# Patient Record
Sex: MALE | Race: WHITE | NOT HISPANIC OR LATINO | Employment: OTHER | URBAN - METROPOLITAN AREA
[De-identification: names, ages, dates, MRNs, and addresses within clinical notes are randomized per-mention and may not be internally consistent; named-entity substitution may affect disease eponyms.]

---

## 2017-04-18 ENCOUNTER — HOSPITAL ENCOUNTER (OUTPATIENT)
Dept: FAMILY MEDICINE CLINIC | Facility: CLINIC | Age: 57
Setting detail: SPECIMEN
Discharge: HOME | End: 2017-04-18
Attending: FAMILY MEDICINE | Admitting: FAMILY MEDICINE

## 2017-04-18 LAB
ANION GAP SERPL CALC-SCNC: 12.1 MMOL/L (ref 10–20)
BUN SERPL-MCNC: 15 MG/DL (ref 8–20)
BUN/CREAT SERPL: 18.8 (ref 6.2–20.3)
CALCIUM SERPL-MCNC: 9.5 MG/DL (ref 8.9–10.3)
CHLORIDE SERPL-SCNC: 111 MMOL/L (ref 101–111)
CHOLEST SERPL-MCNC: 136 MG/DL
CHOLEST/HDLC SERPL: 4.4 {RATIO}
CONV CO2: 23 MMOL/L (ref 22–32)
CONV LDL CHOLESTEROL DIRECT: 84 MG/DL (ref 0–100)
CREAT UR-MCNC: 0.8 MG/DL (ref 0.7–1.2)
GLUCOSE SERPL-MCNC: 154 MG/DL (ref 65–99)
HDLC SERPL-MCNC: 31 MG/DL
LDLC/HDLC SERPL: 2.7 {RATIO}
LIPID INTERPRETATION: ABNORMAL
POTASSIUM SERPL-SCNC: 4.1 MMOL/L (ref 3.6–5.1)
SODIUM SERPL-SCNC: 142 MMOL/L (ref 136–144)
TRIGL SERPL-MCNC: 170 MG/DL
TSH SERPL-ACNC: 5.61 UIU/ML (ref 0.34–5.6)
VLDLC SERPL CALC-MCNC: 21.2 MG/DL

## 2017-12-23 ENCOUNTER — HOSPITAL ENCOUNTER (INPATIENT)
Facility: HOSPITAL | Age: 57
LOS: 19 days | Discharge: NURSING, REHAB OR LONG-TERM CARE | End: 2018-01-11
Attending: INTERNAL MEDICINE | Admitting: INTERNAL MEDICINE

## 2017-12-23 ENCOUNTER — APPOINTMENT (OUTPATIENT)
Dept: CARDIOLOGY | Facility: HOSPITAL | Age: 57
End: 2017-12-23
Attending: INTERNAL MEDICINE

## 2017-12-23 DIAGNOSIS — R93.7 ABNORMAL MRI, LUMBAR SPINE: Primary | ICD-10-CM

## 2017-12-23 DIAGNOSIS — S41.109A: ICD-10-CM

## 2017-12-23 PROBLEM — A41.9 SEPSIS (HCC): Status: ACTIVE | Noted: 2017-12-23

## 2017-12-23 LAB
ALBUMIN SERPL-MCNC: 2.4 G/DL (ref 3.5–5.2)
ALBUMIN/GLOB SERPL: 0.6 G/DL
ALP SERPL-CCNC: 74 U/L (ref 39–117)
ALT SERPL W P-5'-P-CCNC: 20 U/L (ref 1–41)
AMPHET+METHAMPHET UR QL: NEGATIVE
ANION GAP SERPL CALCULATED.3IONS-SCNC: 18.7 MMOL/L
ASCENDING AORTA: 3.7 CM
AST SERPL-CCNC: 23 U/L (ref 1–40)
BARBITURATES UR QL SCN: NEGATIVE
BENZODIAZ UR QL SCN: NEGATIVE
BH CV ECHO MEAS - ACS: 2.4 CM
BH CV ECHO MEAS - AO MAX PG: 6 MMHG
BH CV ECHO MEAS - AO MEAN PG (FULL): 2 MMHG
BH CV ECHO MEAS - AO MEAN PG: 5 MMHG
BH CV ECHO MEAS - AO ROOT AREA (BSA CORRECTED): 1.7
BH CV ECHO MEAS - AO ROOT AREA: 13.9 CM^2
BH CV ECHO MEAS - AO ROOT DIAM: 4.2 CM
BH CV ECHO MEAS - AO V2 MAX: 122 CM/SEC
BH CV ECHO MEAS - AO V2 MEAN: 102 CM/SEC
BH CV ECHO MEAS - AO V2 VTI: 18.5 CM
BH CV ECHO MEAS - ASC AORTA: 3.7 CM
BH CV ECHO MEAS - AVA(I,A): 4.6 CM^2
BH CV ECHO MEAS - AVA(I,D): 4.6 CM^2
BH CV ECHO MEAS - BSA(HAYCOCK): 2.6 M^2
BH CV ECHO MEAS - BSA: 2.5 M^2
BH CV ECHO MEAS - BZI_BMI: 31.3 KILOGRAMS/M^2
BH CV ECHO MEAS - BZI_METRIC_HEIGHT: 195.6 CM
BH CV ECHO MEAS - BZI_METRIC_WEIGHT: 119.8 KG
BH CV ECHO MEAS - CONTRAST EF (2CH): 57.8 ML/M^2
BH CV ECHO MEAS - CONTRAST EF 4CH: 70.9 ML/M^2
BH CV ECHO MEAS - EDV(CUBED): 97.3 ML
BH CV ECHO MEAS - EDV(MOD-SP2): 45 ML
BH CV ECHO MEAS - EDV(MOD-SP4): 103 ML
BH CV ECHO MEAS - EDV(TEICH): 97.3 ML
BH CV ECHO MEAS - EF(CUBED): 81.9 %
BH CV ECHO MEAS - EF(MOD-SP2): 57.8 %
BH CV ECHO MEAS - EF(MOD-SP4): 70.9 %
BH CV ECHO MEAS - EF(TEICH): 74.7 %
BH CV ECHO MEAS - ESV(CUBED): 17.6 ML
BH CV ECHO MEAS - ESV(MOD-SP2): 19 ML
BH CV ECHO MEAS - ESV(MOD-SP4): 30 ML
BH CV ECHO MEAS - ESV(TEICH): 24.6 ML
BH CV ECHO MEAS - FS: 43.5 %
BH CV ECHO MEAS - IVS/LVPW: 0.79
BH CV ECHO MEAS - IVSD: 1.1 CM
BH CV ECHO MEAS - LV DIASTOLIC VOL/BSA (35-75): 40.9 ML/M^2
BH CV ECHO MEAS - LV MASS(C)D: 217.4 GRAMS
BH CV ECHO MEAS - LV MASS(C)DI: 86.4 GRAMS/M^2
BH CV ECHO MEAS - LV MEAN PG: 3 MMHG
BH CV ECHO MEAS - LV SYSTOLIC VOL/BSA (12-30): 11.9 ML/M^2
BH CV ECHO MEAS - LV V1 MEAN: 79.4 CM/SEC
BH CV ECHO MEAS - LV V1 VTI: 14.9 CM
BH CV ECHO MEAS - LVIDD: 4.6 CM
BH CV ECHO MEAS - LVIDS: 2.6 CM
BH CV ECHO MEAS - LVLD AP2: 7.7 CM
BH CV ECHO MEAS - LVLD AP4: 7.5 CM
BH CV ECHO MEAS - LVLS AP2: 6.8 CM
BH CV ECHO MEAS - LVLS AP4: 7.4 CM
BH CV ECHO MEAS - LVOT AREA (M): 5.7 CM^2
BH CV ECHO MEAS - LVOT AREA: 5.7 CM^2
BH CV ECHO MEAS - LVOT DIAM: 2.7 CM
BH CV ECHO MEAS - LVPWD: 1.4 CM
BH CV ECHO MEAS - MV A DUR: 0.15 SEC
BH CV ECHO MEAS - MV A MAX VEL: 84.3 CM/SEC
BH CV ECHO MEAS - MV DEC SLOPE: 326 CM/SEC^2
BH CV ECHO MEAS - MV DEC TIME: 0.17 SEC
BH CV ECHO MEAS - MV E MAX VEL: 53.5 CM/SEC
BH CV ECHO MEAS - MV E/A: 0.63
BH CV ECHO MEAS - MV MEAN PG: 1 MMHG
BH CV ECHO MEAS - MV P1/2T MAX VEL: 62.8 CM/SEC
BH CV ECHO MEAS - MV P1/2T: 56.4 MSEC
BH CV ECHO MEAS - MV V2 MEAN: 45.3 CM/SEC
BH CV ECHO MEAS - MV V2 VTI: 15.9 CM
BH CV ECHO MEAS - MVA P1/2T LCG: 3.5 CM^2
BH CV ECHO MEAS - MVA(P1/2T): 3.9 CM^2
BH CV ECHO MEAS - MVA(VTI): 5.4 CM^2
BH CV ECHO MEAS - PA ACC SLOPE: 10.4 CM/SEC^2
BH CV ECHO MEAS - PA ACC TIME: 0.15 SEC
BH CV ECHO MEAS - PA MAX PG (FULL): 2.2 MMHG
BH CV ECHO MEAS - PA MAX PG: 7.2 MMHG
BH CV ECHO MEAS - PA PR(ACCEL): 12.4 MMHG
BH CV ECHO MEAS - PA V2 MAX: 134 CM/SEC
BH CV ECHO MEAS - PVA(V,A): 4.1 CM^2
BH CV ECHO MEAS - PVA(V,D): 4.1 CM^2
BH CV ECHO MEAS - QP/QS: 0.98
BH CV ECHO MEAS - RV MAX PG: 5 MMHG
BH CV ECHO MEAS - RV MEAN PG: 2 MMHG
BH CV ECHO MEAS - RV V1 MAX: 112 CM/SEC
BH CV ECHO MEAS - RV V1 MEAN: 67.7 CM/SEC
BH CV ECHO MEAS - RV V1 VTI: 17 CM
BH CV ECHO MEAS - RVOT AREA: 4.9 CM^2
BH CV ECHO MEAS - RVOT DIAM: 2.5 CM
BH CV ECHO MEAS - SI(AO): 101.8 ML/M^2
BH CV ECHO MEAS - SI(CUBED): 31.7 ML/M^2
BH CV ECHO MEAS - SI(LVOT): 33.9 ML/M^2
BH CV ECHO MEAS - SI(MOD-SP2): 10.3 ML/M^2
BH CV ECHO MEAS - SI(MOD-SP4): 29 ML/M^2
BH CV ECHO MEAS - SI(TEICH): 28.9 ML/M^2
BH CV ECHO MEAS - SUP REN AO DIAM: 2.6 CM
BH CV ECHO MEAS - SV(AO): 256.3 ML
BH CV ECHO MEAS - SV(CUBED): 79.8 ML
BH CV ECHO MEAS - SV(LVOT): 85.3 ML
BH CV ECHO MEAS - SV(MOD-SP2): 26 ML
BH CV ECHO MEAS - SV(MOD-SP4): 73 ML
BH CV ECHO MEAS - SV(RVOT): 83.4 ML
BH CV ECHO MEAS - SV(TEICH): 72.7 ML
BH CV ECHO MEAS - TR MAX VEL: 144 CM/SEC
BH CV LOWER VASCULAR LEFT COMMON FEMORAL AUGMENT: NORMAL
BH CV LOWER VASCULAR LEFT COMMON FEMORAL COMPETENT: NORMAL
BH CV LOWER VASCULAR LEFT COMMON FEMORAL COMPRESS: NORMAL
BH CV LOWER VASCULAR LEFT COMMON FEMORAL PHASIC: NORMAL
BH CV LOWER VASCULAR LEFT COMMON FEMORAL SPONT: NORMAL
BH CV LOWER VASCULAR LEFT DISTAL FEMORAL COMPRESS: NORMAL
BH CV LOWER VASCULAR LEFT GASTRONEMIUS COMPRESS: NORMAL
BH CV LOWER VASCULAR LEFT GREATER SAPH AK COMPRESS: NORMAL
BH CV LOWER VASCULAR LEFT GREATER SAPH BK COMPRESS: NORMAL
BH CV LOWER VASCULAR LEFT MID FEMORAL AUGMENT: NORMAL
BH CV LOWER VASCULAR LEFT MID FEMORAL COMPETENT: NORMAL
BH CV LOWER VASCULAR LEFT MID FEMORAL COMPRESS: NORMAL
BH CV LOWER VASCULAR LEFT MID FEMORAL PHASIC: NORMAL
BH CV LOWER VASCULAR LEFT MID FEMORAL SPONT: NORMAL
BH CV LOWER VASCULAR LEFT PERONEAL COMPRESS: NORMAL
BH CV LOWER VASCULAR LEFT POPLITEAL AUGMENT: NORMAL
BH CV LOWER VASCULAR LEFT POPLITEAL COMPETENT: NORMAL
BH CV LOWER VASCULAR LEFT POPLITEAL COMPRESS: NORMAL
BH CV LOWER VASCULAR LEFT POPLITEAL PHASIC: NORMAL
BH CV LOWER VASCULAR LEFT POPLITEAL SPONT: NORMAL
BH CV LOWER VASCULAR LEFT POSTERIOR TIBIAL COMPRESS: NORMAL
BH CV LOWER VASCULAR LEFT PROXIMAL FEMORAL COMPRESS: NORMAL
BH CV LOWER VASCULAR LEFT SAPHENOFEMORAL JUNCTION COMPRESS: NORMAL
BH CV LOWER VASCULAR LEFT SAPHENOFEMORAL JUNCTION PHASIC: NORMAL
BH CV LOWER VASCULAR LEFT SAPHENOFEMORAL JUNCTION SPONT: NORMAL
BH CV LOWER VASCULAR RIGHT COMMON FEMORAL AUGMENT: NORMAL
BH CV LOWER VASCULAR RIGHT COMMON FEMORAL COMPETENT: NORMAL
BH CV LOWER VASCULAR RIGHT COMMON FEMORAL COMPRESS: NORMAL
BH CV LOWER VASCULAR RIGHT COMMON FEMORAL PHASIC: NORMAL
BH CV LOWER VASCULAR RIGHT COMMON FEMORAL SPONT: NORMAL
BH CV LOWER VASCULAR RIGHT DISTAL FEMORAL COMPRESS: NORMAL
BH CV LOWER VASCULAR RIGHT GASTRONEMIUS COMPRESS: NORMAL
BH CV LOWER VASCULAR RIGHT GREATER SAPH AK COMPRESS: NORMAL
BH CV LOWER VASCULAR RIGHT GREATER SAPH BK COMPRESS: NORMAL
BH CV LOWER VASCULAR RIGHT MID FEMORAL AUGMENT: NORMAL
BH CV LOWER VASCULAR RIGHT MID FEMORAL COMPETENT: NORMAL
BH CV LOWER VASCULAR RIGHT MID FEMORAL COMPRESS: NORMAL
BH CV LOWER VASCULAR RIGHT MID FEMORAL PHASIC: NORMAL
BH CV LOWER VASCULAR RIGHT MID FEMORAL SPONT: NORMAL
BH CV LOWER VASCULAR RIGHT PERONEAL COMPRESS: NORMAL
BH CV LOWER VASCULAR RIGHT POPLITEAL AUGMENT: NORMAL
BH CV LOWER VASCULAR RIGHT POPLITEAL COMPETENT: NORMAL
BH CV LOWER VASCULAR RIGHT POPLITEAL COMPRESS: NORMAL
BH CV LOWER VASCULAR RIGHT POPLITEAL PHASIC: NORMAL
BH CV LOWER VASCULAR RIGHT POPLITEAL SPONT: NORMAL
BH CV LOWER VASCULAR RIGHT POSTERIOR TIBIAL COMPRESS: NORMAL
BH CV LOWER VASCULAR RIGHT PROXIMAL FEMORAL COMPRESS: NORMAL
BH CV LOWER VASCULAR RIGHT SAPHENOFEMORAL JUNCTION COMPRESS: NORMAL
BH CV LOWER VASCULAR RIGHT SAPHENOFEMORAL JUNCTION PHASIC: NORMAL
BH CV LOWER VASCULAR RIGHT SAPHENOFEMORAL JUNCTION SPONT: NORMAL
BH CV UPPER VENOUS LEFT AXILLARY AUGMENT: NORMAL
BH CV UPPER VENOUS LEFT AXILLARY COMPETENT: NORMAL
BH CV UPPER VENOUS LEFT AXILLARY COMPRESS: NORMAL
BH CV UPPER VENOUS LEFT AXILLARY PHASIC: NORMAL
BH CV UPPER VENOUS LEFT AXILLARY SPONT: NORMAL
BH CV UPPER VENOUS LEFT BASILIC FOREARM COMPRESS: NORMAL
BH CV UPPER VENOUS LEFT BASILIC UPPER COMPRESS: NORMAL
BH CV UPPER VENOUS LEFT BRACHIAL COMPRESS: NORMAL
BH CV UPPER VENOUS LEFT CEPHALIC FOREARM COMPRESS: NORMAL
BH CV UPPER VENOUS LEFT CEPHALIC UPPER COMPRESS: NORMAL
BH CV UPPER VENOUS LEFT INTERNAL JUGULAR AUGMENT: NORMAL
BH CV UPPER VENOUS LEFT INTERNAL JUGULAR COMPETENT: NORMAL
BH CV UPPER VENOUS LEFT INTERNAL JUGULAR COMPRESS: NORMAL
BH CV UPPER VENOUS LEFT INTERNAL JUGULAR PHASIC: NORMAL
BH CV UPPER VENOUS LEFT INTERNAL JUGULAR SPONT: NORMAL
BH CV UPPER VENOUS LEFT RADIAL COMPRESS: NORMAL
BH CV UPPER VENOUS LEFT SUBCLAVIAN AUGMENT: NORMAL
BH CV UPPER VENOUS LEFT SUBCLAVIAN COMPETENT: NORMAL
BH CV UPPER VENOUS LEFT SUBCLAVIAN COMPRESS: NORMAL
BH CV UPPER VENOUS LEFT SUBCLAVIAN PHASIC: NORMAL
BH CV UPPER VENOUS LEFT SUBCLAVIAN SPONT: NORMAL
BH CV UPPER VENOUS LEFT ULNAR COMPRESS: NORMAL
BH CV UPPER VENOUS RIGHT AXILLARY AUGMENT: NORMAL
BH CV UPPER VENOUS RIGHT AXILLARY COMPETENT: NORMAL
BH CV UPPER VENOUS RIGHT AXILLARY COMPRESS: NORMAL
BH CV UPPER VENOUS RIGHT AXILLARY PHASIC: NORMAL
BH CV UPPER VENOUS RIGHT AXILLARY SPONT: NORMAL
BH CV UPPER VENOUS RIGHT BASILIC FOREARM COMPRESS: NORMAL
BH CV UPPER VENOUS RIGHT BASILIC UPPER COMPRESS: NORMAL
BH CV UPPER VENOUS RIGHT BRACHIAL COMPRESS: NORMAL
BH CV UPPER VENOUS RIGHT CEPHALIC FOREARM COMPRESS: NORMAL
BH CV UPPER VENOUS RIGHT CEPHALIC UPPER COMPRESS: NORMAL
BH CV UPPER VENOUS RIGHT INTERNAL JUGULAR AUGMENT: NORMAL
BH CV UPPER VENOUS RIGHT INTERNAL JUGULAR COMPETENT: NORMAL
BH CV UPPER VENOUS RIGHT INTERNAL JUGULAR COMPRESS: NORMAL
BH CV UPPER VENOUS RIGHT INTERNAL JUGULAR PHASIC: NORMAL
BH CV UPPER VENOUS RIGHT INTERNAL JUGULAR SPONT: NORMAL
BH CV UPPER VENOUS RIGHT RADIAL COMPRESS: NORMAL
BH CV UPPER VENOUS RIGHT SUBCLAVIAN AUGMENT: NORMAL
BH CV UPPER VENOUS RIGHT SUBCLAVIAN COMPETENT: NORMAL
BH CV UPPER VENOUS RIGHT SUBCLAVIAN COMPRESS: NORMAL
BH CV UPPER VENOUS RIGHT SUBCLAVIAN PHASIC: NORMAL
BH CV UPPER VENOUS RIGHT SUBCLAVIAN SPONT: NORMAL
BH CV UPPER VENOUS RIGHT ULNAR COMPRESS: NORMAL
BH CV VAS BP RIGHT ARM: NORMAL MMHG
BH CV XLRA - RV BASE: 2.1 CM
BILIRUB SERPL-MCNC: 1 MG/DL (ref 0.1–1.2)
BUN BLD-MCNC: 28 MG/DL (ref 6–20)
BUN/CREAT SERPL: 35.9 (ref 7–25)
CA-I BLD-MCNC: 5 MG/DL (ref 4.6–5.4)
CA-I SERPL ISE-MCNC: 1.26 MMOL/L (ref 1.15–1.35)
CALCIUM SPEC-SCNC: 9.1 MG/DL (ref 8.6–10.5)
CANNABINOIDS SERPL QL: NEGATIVE
CHLORIDE SERPL-SCNC: 89 MMOL/L (ref 98–107)
CK SERPL-CCNC: 110 U/L (ref 20–200)
CLUMPED PLATELETS: PRESENT
CO2 SERPL-SCNC: 22.3 MMOL/L (ref 22–29)
COCAINE UR QL: NEGATIVE
CREAT BLD-MCNC: 0.78 MG/DL (ref 0.76–1.27)
CRP SERPL-MCNC: 40.59 MG/DL (ref 0–0.5)
D-LACTATE SERPL-SCNC: 2.9 MMOL/L (ref 0.5–2)
D-LACTATE SERPL-SCNC: 3.7 MMOL/L (ref 0.5–2)
DEPRECATED RDW RBC AUTO: 45.3 FL (ref 37–54)
ERYTHROCYTE [DISTWIDTH] IN BLOOD BY AUTOMATED COUNT: 13.9 % (ref 11.5–14.5)
GFR SERPL CREATININE-BSD FRML MDRD: 103 ML/MIN/1.73
GLOBULIN UR ELPH-MCNC: 4.3 GM/DL
GLUCOSE BLD-MCNC: 357 MG/DL (ref 65–99)
GLUCOSE BLDC GLUCOMTR-MCNC: 218 MG/DL (ref 70–130)
GLUCOSE BLDC GLUCOMTR-MCNC: 221 MG/DL (ref 70–130)
GLUCOSE BLDC GLUCOMTR-MCNC: 237 MG/DL (ref 70–130)
GLUCOSE BLDC GLUCOMTR-MCNC: 248 MG/DL (ref 70–130)
GLUCOSE BLDC GLUCOMTR-MCNC: 277 MG/DL (ref 70–130)
GLUCOSE BLDC GLUCOMTR-MCNC: 342 MG/DL (ref 70–130)
GLUCOSE BLDC GLUCOMTR-MCNC: 428 MG/DL (ref 70–130)
HBA1C MFR BLD: 11.44 % (ref 4.8–5.6)
HCT VFR BLD AUTO: 44 % (ref 40.4–52.2)
HCV AB SER DONR QL: REACTIVE
HGB BLD-MCNC: 14.7 G/DL (ref 13.7–17.6)
HIV1 P24 AG SER QL: NORMAL
HIV1+2 AB SER QL: NORMAL
HOLD SPECIMEN: NORMAL
INR PPP: 1.31 (ref 0.9–1.1)
LEFT ATRIUM VOLUME INDEX: 15 ML/M2
LV EF 2D ECHO EST: 70 %
LYMPHOCYTES # BLD MANUAL: 2.15 10*3/MM3 (ref 0.9–4.8)
LYMPHOCYTES NFR BLD MANUAL: 20 % (ref 19.6–45.3)
LYMPHOCYTES NFR BLD MANUAL: 6 % (ref 5–12)
MAXIMAL PREDICTED HEART RATE: 163 BPM
MCH RBC QN AUTO: 30.2 PG (ref 27–32.7)
MCHC RBC AUTO-ENTMCNC: 33.4 G/DL (ref 32.6–36.4)
MCV RBC AUTO: 90.5 FL (ref 79.8–96.2)
METHADONE UR QL SCN: NEGATIVE
MONOCYTES # BLD AUTO: 0.65 10*3/MM3 (ref 0.2–1.2)
NEUTROPHILS # BLD AUTO: 7.74 10*3/MM3 (ref 1.9–8.1)
NEUTROPHILS NFR BLD MANUAL: 72 % (ref 42.7–76)
NRBC SPEC MANUAL: 2 /100 WBC (ref 0–0)
OPIATES UR QL: POSITIVE
OXYCODONE UR QL SCN: NEGATIVE
PLATELET # BLD AUTO: 318 10*3/MM3 (ref 140–500)
PMV BLD AUTO: 12.6 FL (ref 6–12)
POTASSIUM BLD-SCNC: 3.3 MMOL/L (ref 3.5–5.2)
POTASSIUM BLD-SCNC: 3.4 MMOL/L (ref 3.5–5.2)
PROT SERPL-MCNC: 6.7 G/DL (ref 6–8.5)
PROTHROMBIN TIME: 15.8 SECONDS (ref 11.7–14.2)
RBC # BLD AUTO: 4.86 10*6/MM3 (ref 4.6–6)
RBC MORPH BLD: NORMAL
SCAN SLIDE: NORMAL
SODIUM BLD-SCNC: 130 MMOL/L (ref 136–145)
STRESS TARGET HR: 139 BPM
VARIANT LYMPHS NFR BLD MANUAL: 2 % (ref 0–5)
WBC MORPH BLD: NORMAL
WBC NRBC COR # BLD: 10.75 10*3/MM3 (ref 4.5–10.7)

## 2017-12-23 PROCEDURE — 87040 BLOOD CULTURE FOR BACTERIA: CPT | Performed by: INTERNAL MEDICINE

## 2017-12-23 PROCEDURE — 84132 ASSAY OF SERUM POTASSIUM: CPT | Performed by: INTERNAL MEDICINE

## 2017-12-23 PROCEDURE — 80307 DRUG TEST PRSMV CHEM ANLYZR: CPT | Performed by: INTERNAL MEDICINE

## 2017-12-23 PROCEDURE — 99222 1ST HOSP IP/OBS MODERATE 55: CPT | Performed by: NEUROLOGICAL SURGERY

## 2017-12-23 PROCEDURE — 86140 C-REACTIVE PROTEIN: CPT | Performed by: INTERNAL MEDICINE

## 2017-12-23 PROCEDURE — 84681 ASSAY OF C-PEPTIDE: CPT | Performed by: INTERNAL MEDICINE

## 2017-12-23 PROCEDURE — 82550 ASSAY OF CK (CPK): CPT | Performed by: INTERNAL MEDICINE

## 2017-12-23 PROCEDURE — 94640 AIRWAY INHALATION TREATMENT: CPT

## 2017-12-23 PROCEDURE — 25010000002 VANCOMYCIN 10 G RECONSTITUTED SOLUTION: Performed by: INTERNAL MEDICINE

## 2017-12-23 PROCEDURE — 94799 UNLISTED PULMONARY SVC/PX: CPT

## 2017-12-23 PROCEDURE — 86341 ISLET CELL ANTIBODY: CPT | Performed by: INTERNAL MEDICINE

## 2017-12-23 PROCEDURE — 85610 PROTHROMBIN TIME: CPT | Performed by: INTERNAL MEDICINE

## 2017-12-23 PROCEDURE — G0432 EIA HIV-1/HIV-2 SCREEN: HCPCS | Performed by: INTERNAL MEDICINE

## 2017-12-23 PROCEDURE — 93306 TTE W/DOPPLER COMPLETE: CPT

## 2017-12-23 PROCEDURE — 80053 COMPREHEN METABOLIC PANEL: CPT | Performed by: INTERNAL MEDICINE

## 2017-12-23 PROCEDURE — 85025 COMPLETE CBC W/AUTO DIFF WBC: CPT | Performed by: INTERNAL MEDICINE

## 2017-12-23 PROCEDURE — 82330 ASSAY OF CALCIUM: CPT | Performed by: INTERNAL MEDICINE

## 2017-12-23 PROCEDURE — 93970 EXTREMITY STUDY: CPT

## 2017-12-23 PROCEDURE — 99223 1ST HOSP IP/OBS HIGH 75: CPT | Performed by: INTERNAL MEDICINE

## 2017-12-23 PROCEDURE — 25010000003 POTASSIUM CHLORIDE 10 MEQ/100ML SOLUTION: Performed by: INTERNAL MEDICINE

## 2017-12-23 PROCEDURE — 25010000002 PERFLUTREN (DEFINITY) 8.476 MG IN SODIUM CHLORIDE 0.9 % 10 ML INJECTION: Performed by: INTERNAL MEDICINE

## 2017-12-23 PROCEDURE — 63710000001 INSULIN ASPART PER 5 UNITS: Performed by: INTERNAL MEDICINE

## 2017-12-23 PROCEDURE — 87899 AGENT NOS ASSAY W/OPTIC: CPT | Performed by: INTERNAL MEDICINE

## 2017-12-23 PROCEDURE — 93306 TTE W/DOPPLER COMPLETE: CPT | Performed by: INTERNAL MEDICINE

## 2017-12-23 PROCEDURE — 82962 GLUCOSE BLOOD TEST: CPT

## 2017-12-23 PROCEDURE — 25010000002 CEFEPIME PER 500 MG: Performed by: INTERNAL MEDICINE

## 2017-12-23 PROCEDURE — 86803 HEPATITIS C AB TEST: CPT | Performed by: INTERNAL MEDICINE

## 2017-12-23 PROCEDURE — 63710000001 INSULIN DETEMER PER 5 UNITS: Performed by: INTERNAL MEDICINE

## 2017-12-23 PROCEDURE — 25010000002 FENTANYL CITRATE (PF) 100 MCG/2ML SOLUTION: Performed by: INTERNAL MEDICINE

## 2017-12-23 PROCEDURE — 83605 ASSAY OF LACTIC ACID: CPT | Performed by: INTERNAL MEDICINE

## 2017-12-23 PROCEDURE — 83036 HEMOGLOBIN GLYCOSYLATED A1C: CPT | Performed by: INTERNAL MEDICINE

## 2017-12-23 RX ORDER — GABAPENTIN 300 MG/1
300 CAPSULE ORAL 4 TIMES DAILY
COMMUNITY

## 2017-12-23 RX ORDER — POTASSIUM CHLORIDE 750 MG/1
40 CAPSULE, EXTENDED RELEASE ORAL AS NEEDED
Status: DISCONTINUED | OUTPATIENT
Start: 2017-12-23 | End: 2018-01-11 | Stop reason: HOSPADM

## 2017-12-23 RX ORDER — ASPIRIN 81 MG/1
81 TABLET ORAL DAILY
COMMUNITY

## 2017-12-23 RX ORDER — MAGNESIUM SULFATE 1 G/100ML
1 INJECTION INTRAVENOUS AS NEEDED
Status: DISCONTINUED | OUTPATIENT
Start: 2017-12-23 | End: 2018-01-11 | Stop reason: HOSPADM

## 2017-12-23 RX ORDER — DEXTROSE MONOHYDRATE 25 G/50ML
25 INJECTION, SOLUTION INTRAVENOUS
Status: DISCONTINUED | OUTPATIENT
Start: 2017-12-23 | End: 2018-01-11 | Stop reason: HOSPADM

## 2017-12-23 RX ORDER — MELOXICAM 15 MG/1
25 TABLET ORAL 2 TIMES DAILY
COMMUNITY

## 2017-12-23 RX ORDER — MAGNESIUM SULFATE HEPTAHYDRATE 40 MG/ML
2 INJECTION, SOLUTION INTRAVENOUS AS NEEDED
Status: DISCONTINUED | OUTPATIENT
Start: 2017-12-23 | End: 2018-01-11 | Stop reason: HOSPADM

## 2017-12-23 RX ORDER — NICOTINE POLACRILEX 4 MG
15 LOZENGE BUCCAL
Status: DISCONTINUED | OUTPATIENT
Start: 2017-12-23 | End: 2018-01-11 | Stop reason: HOSPADM

## 2017-12-23 RX ORDER — ACETAMINOPHEN 650 MG/1
650 SUPPOSITORY RECTAL EVERY 4 HOURS PRN
Status: DISCONTINUED | OUTPATIENT
Start: 2017-12-23 | End: 2018-01-11 | Stop reason: HOSPADM

## 2017-12-23 RX ORDER — OXYCODONE HYDROCHLORIDE 15 MG/1
15 TABLET ORAL EVERY 6 HOURS PRN
Status: ON HOLD | COMMUNITY
End: 2018-01-11

## 2017-12-23 RX ORDER — SODIUM CHLORIDE 9 MG/ML
100 INJECTION, SOLUTION INTRAVENOUS CONTINUOUS
Status: DISCONTINUED | OUTPATIENT
Start: 2017-12-23 | End: 2017-12-26

## 2017-12-23 RX ORDER — POTASSIUM CHLORIDE 1.5 G/1.77G
40 POWDER, FOR SOLUTION ORAL AS NEEDED
Status: DISCONTINUED | OUTPATIENT
Start: 2017-12-23 | End: 2018-01-11 | Stop reason: HOSPADM

## 2017-12-23 RX ORDER — ONDANSETRON 2 MG/ML
4 INJECTION INTRAMUSCULAR; INTRAVENOUS EVERY 6 HOURS PRN
Status: DISCONTINUED | OUTPATIENT
Start: 2017-12-23 | End: 2018-01-11 | Stop reason: HOSPADM

## 2017-12-23 RX ORDER — IPRATROPIUM BROMIDE AND ALBUTEROL SULFATE 2.5; .5 MG/3ML; MG/3ML
3 SOLUTION RESPIRATORY (INHALATION)
Status: DISCONTINUED | OUTPATIENT
Start: 2017-12-23 | End: 2018-01-11 | Stop reason: HOSPADM

## 2017-12-23 RX ORDER — LISINOPRIL 5 MG/1
5 TABLET ORAL DAILY
COMMUNITY

## 2017-12-23 RX ORDER — POTASSIUM CHLORIDE 7.45 MG/ML
10 INJECTION INTRAVENOUS
Status: DISCONTINUED | OUTPATIENT
Start: 2017-12-23 | End: 2018-01-11 | Stop reason: HOSPADM

## 2017-12-23 RX ORDER — SODIUM CHLORIDE 0.9 % (FLUSH) 0.9 %
1-10 SYRINGE (ML) INJECTION AS NEEDED
Status: DISCONTINUED | OUTPATIENT
Start: 2017-12-23 | End: 2018-01-11 | Stop reason: HOSPADM

## 2017-12-23 RX ORDER — SODIUM CHLORIDE, SODIUM LACTATE, POTASSIUM CHLORIDE, CALCIUM CHLORIDE 600; 310; 30; 20 MG/100ML; MG/100ML; MG/100ML; MG/100ML
200 INJECTION, SOLUTION INTRAVENOUS CONTINUOUS
Status: DISCONTINUED | OUTPATIENT
Start: 2017-12-23 | End: 2017-12-23

## 2017-12-23 RX ORDER — MORPHINE SULFATE 15 MG/1
15 TABLET, FILM COATED, EXTENDED RELEASE ORAL 2 TIMES DAILY
Status: ON HOLD | COMMUNITY
End: 2018-01-11

## 2017-12-23 RX ORDER — ATORVASTATIN CALCIUM 40 MG/1
40 TABLET, FILM COATED ORAL DAILY
COMMUNITY

## 2017-12-23 RX ORDER — ACETAMINOPHEN 325 MG/1
650 TABLET ORAL EVERY 4 HOURS PRN
Status: DISCONTINUED | OUTPATIENT
Start: 2017-12-23 | End: 2018-01-11 | Stop reason: HOSPADM

## 2017-12-23 RX ORDER — HYDROCHLOROTHIAZIDE 12.5 MG/1
12.5 TABLET ORAL DAILY
COMMUNITY

## 2017-12-23 RX ORDER — BACLOFEN 10 MG/1
10 TABLET ORAL DAILY PRN
COMMUNITY

## 2017-12-23 RX ORDER — FENTANYL CITRATE 50 UG/ML
25 INJECTION, SOLUTION INTRAMUSCULAR; INTRAVENOUS
Status: DISCONTINUED | OUTPATIENT
Start: 2017-12-23 | End: 2017-12-26

## 2017-12-23 RX ADMIN — PERFLUTREN 4 ML: 6.52 INJECTION, SUSPENSION INTRAVENOUS at 14:30

## 2017-12-23 RX ADMIN — IPRATROPIUM BROMIDE AND ALBUTEROL SULFATE 3 ML: .5; 3 SOLUTION RESPIRATORY (INHALATION) at 20:03

## 2017-12-23 RX ADMIN — INSULIN ASPART 3 UNITS: 100 INJECTION, SOLUTION INTRAVENOUS; SUBCUTANEOUS at 21:36

## 2017-12-23 RX ADMIN — POTASSIUM CHLORIDE 10 MEQ: 10 INJECTION, SOLUTION INTRAVENOUS at 23:03

## 2017-12-23 RX ADMIN — INSULIN ASPART 8 UNITS: 100 INJECTION, SOLUTION INTRAVENOUS; SUBCUTANEOUS at 11:39

## 2017-12-23 RX ADMIN — FENTANYL CITRATE 25 MCG: 50 INJECTION INTRAMUSCULAR; INTRAVENOUS at 12:53

## 2017-12-23 RX ADMIN — INSULIN ASPART 8 UNITS: 100 INJECTION, SOLUTION INTRAVENOUS; SUBCUTANEOUS at 08:49

## 2017-12-23 RX ADMIN — FENTANYL CITRATE 25 MCG: 50 INJECTION INTRAMUSCULAR; INTRAVENOUS at 15:38

## 2017-12-23 RX ADMIN — INSULIN ASPART 3 UNITS: 100 INJECTION, SOLUTION INTRAVENOUS; SUBCUTANEOUS at 17:58

## 2017-12-23 RX ADMIN — POTASSIUM CHLORIDE 10 MEQ: 10 INJECTION, SOLUTION INTRAVENOUS at 05:34

## 2017-12-23 RX ADMIN — POTASSIUM CHLORIDE 10 MEQ: 10 INJECTION, SOLUTION INTRAVENOUS at 13:05

## 2017-12-23 RX ADMIN — POTASSIUM CHLORIDE 10 MEQ: 10 INJECTION, SOLUTION INTRAVENOUS at 10:46

## 2017-12-23 RX ADMIN — POTASSIUM CHLORIDE 10 MEQ: 10 INJECTION, SOLUTION INTRAVENOUS at 21:43

## 2017-12-23 RX ADMIN — WATER 2 G: 1 INJECTION INTRAMUSCULAR; INTRAVENOUS; SUBCUTANEOUS at 12:54

## 2017-12-23 RX ADMIN — FENTANYL CITRATE 25 MCG: 50 INJECTION INTRAMUSCULAR; INTRAVENOUS at 18:38

## 2017-12-23 RX ADMIN — INSULIN DETEMIR 15 UNITS: 100 INJECTION, SOLUTION SUBCUTANEOUS at 16:36

## 2017-12-23 RX ADMIN — WATER 2 G: 1 INJECTION INTRAMUSCULAR; INTRAVENOUS; SUBCUTANEOUS at 21:36

## 2017-12-23 RX ADMIN — INSULIN ASPART 24 UNITS: 100 INJECTION, SOLUTION INTRAVENOUS; SUBCUTANEOUS at 04:24

## 2017-12-23 RX ADMIN — FENTANYL CITRATE 25 MCG: 50 INJECTION INTRAMUSCULAR; INTRAVENOUS at 21:49

## 2017-12-23 RX ADMIN — VANCOMYCIN HYDROCHLORIDE 1500 MG: 100 INJECTION, POWDER, LYOPHILIZED, FOR SOLUTION INTRAVENOUS at 21:36

## 2017-12-23 RX ADMIN — POTASSIUM CHLORIDE 10 MEQ: 10 INJECTION, SOLUTION INTRAVENOUS at 17:58

## 2017-12-23 RX ADMIN — SODIUM CHLORIDE 200 ML/HR: 9 INJECTION, SOLUTION INTRAVENOUS at 12:52

## 2017-12-23 RX ADMIN — SODIUM CHLORIDE, POTASSIUM CHLORIDE, SODIUM LACTATE AND CALCIUM CHLORIDE 1000 ML: 600; 310; 30; 20 INJECTION, SOLUTION INTRAVENOUS at 02:59

## 2017-12-23 RX ADMIN — SODIUM CHLORIDE, POTASSIUM CHLORIDE, SODIUM LACTATE AND CALCIUM CHLORIDE 200 ML/HR: 600; 310; 30; 20 INJECTION, SOLUTION INTRAVENOUS at 04:35

## 2017-12-23 RX ADMIN — WATER 2 G: 1 INJECTION INTRAMUSCULAR; INTRAVENOUS; SUBCUTANEOUS at 05:23

## 2017-12-23 RX ADMIN — VANCOMYCIN HYDROCHLORIDE 1500 MG: 100 INJECTION, POWDER, LYOPHILIZED, FOR SOLUTION INTRAVENOUS at 09:09

## 2017-12-23 RX ADMIN — IPRATROPIUM BROMIDE AND ALBUTEROL SULFATE 3 ML: .5; 3 SOLUTION RESPIRATORY (INHALATION) at 15:28

## 2017-12-23 RX ADMIN — POTASSIUM CHLORIDE 10 MEQ: 10 INJECTION, SOLUTION INTRAVENOUS at 08:19

## 2017-12-23 RX ADMIN — IPRATROPIUM BROMIDE AND ALBUTEROL SULFATE 3 ML: .5; 3 SOLUTION RESPIRATORY (INHALATION) at 11:58

## 2017-12-23 RX ADMIN — IPRATROPIUM BROMIDE AND ALBUTEROL SULFATE 3 ML: .5; 3 SOLUTION RESPIRATORY (INHALATION) at 07:23

## 2017-12-23 RX ADMIN — SODIUM CHLORIDE 200 ML/HR: 9 INJECTION, SOLUTION INTRAVENOUS at 18:30

## 2017-12-24 ENCOUNTER — APPOINTMENT (OUTPATIENT)
Dept: GENERAL RADIOLOGY | Facility: HOSPITAL | Age: 57
End: 2017-12-24

## 2017-12-24 ENCOUNTER — APPOINTMENT (OUTPATIENT)
Dept: MRI IMAGING | Facility: HOSPITAL | Age: 57
End: 2017-12-24

## 2017-12-24 LAB
ALBUMIN SERPL-MCNC: 1.9 G/DL (ref 3.5–5.2)
ALBUMIN/GLOB SERPL: 0.5 G/DL
ALP SERPL-CCNC: 73 U/L (ref 39–117)
ALT SERPL W P-5'-P-CCNC: 22 U/L (ref 1–41)
ANION GAP SERPL CALCULATED.3IONS-SCNC: 13.5 MMOL/L
ARTERIAL PATENCY WRIST A: POSITIVE
AST SERPL-CCNC: 34 U/L (ref 1–40)
ATMOSPHERIC PRESS: 755 MMHG
BASE EXCESS BLDA CALC-SCNC: 2.2 MMOL/L (ref 0–2)
BDY SITE: ABNORMAL
BILIRUB SERPL-MCNC: 0.6 MG/DL (ref 0.1–1.2)
BUN BLD-MCNC: 29 MG/DL (ref 6–20)
BUN/CREAT SERPL: 39.7 (ref 7–25)
CALCIUM SPEC-SCNC: 8.6 MG/DL (ref 8.6–10.5)
CHLORIDE SERPL-SCNC: 103 MMOL/L (ref 98–107)
CO2 SERPL-SCNC: 21.5 MMOL/L (ref 22–29)
CREAT BLD-MCNC: 0.73 MG/DL (ref 0.76–1.27)
D-LACTATE SERPL-SCNC: 1.7 MMOL/L (ref 0.5–2)
DEPRECATED RDW RBC AUTO: 48.5 FL (ref 37–54)
ERYTHROCYTE [DISTWIDTH] IN BLOOD BY AUTOMATED COUNT: 14.5 % (ref 11.5–14.5)
GAS FLOW AIRWAY: 3 LPM
GFR SERPL CREATININE-BSD FRML MDRD: 111 ML/MIN/1.73
GLOBULIN UR ELPH-MCNC: 4.1 GM/DL
GLUCOSE BLD-MCNC: 237 MG/DL (ref 65–99)
GLUCOSE BLDC GLUCOMTR-MCNC: 174 MG/DL (ref 70–130)
GLUCOSE BLDC GLUCOMTR-MCNC: 191 MG/DL (ref 70–130)
GLUCOSE BLDC GLUCOMTR-MCNC: 213 MG/DL (ref 70–130)
GLUCOSE BLDC GLUCOMTR-MCNC: 219 MG/DL (ref 70–130)
GLUCOSE BLDC GLUCOMTR-MCNC: 224 MG/DL (ref 70–130)
HCO3 BLDA-SCNC: 23.9 MMOL/L (ref 22–28)
HCT VFR BLD AUTO: 38.4 % (ref 40.4–52.2)
HGB BLD-MCNC: 12.9 G/DL (ref 13.7–17.6)
MAGNESIUM SERPL-MCNC: 2.2 MG/DL (ref 1.6–2.6)
MCH RBC QN AUTO: 30.9 PG (ref 27–32.7)
MCHC RBC AUTO-ENTMCNC: 33.6 G/DL (ref 32.6–36.4)
MCV RBC AUTO: 91.9 FL (ref 79.8–96.2)
MODALITY: ABNORMAL
PCO2 BLDA: 28.2 MM HG (ref 35–45)
PH BLDA: 7.54 PH UNITS (ref 7.35–7.45)
PLATELET # BLD AUTO: 281 10*3/MM3 (ref 140–500)
PMV BLD AUTO: 11.7 FL (ref 6–12)
PO2 BLDA: 61.5 MM HG (ref 80–100)
POTASSIUM BLD-SCNC: 3.4 MMOL/L (ref 3.5–5.2)
POTASSIUM BLD-SCNC: 3.6 MMOL/L (ref 3.5–5.2)
PROT SERPL-MCNC: 6 G/DL (ref 6–8.5)
RBC # BLD AUTO: 4.18 10*6/MM3 (ref 4.6–6)
SAO2 % BLDCOA: 94.2 % (ref 92–99)
SET MECH RESP RATE: 36
SODIUM BLD-SCNC: 138 MMOL/L (ref 136–145)
WBC NRBC COR # BLD: 10.98 10*3/MM3 (ref 4.5–10.7)

## 2017-12-24 PROCEDURE — 99233 SBSQ HOSP IP/OBS HIGH 50: CPT | Performed by: INTERNAL MEDICINE

## 2017-12-24 PROCEDURE — 94799 UNLISTED PULMONARY SVC/PX: CPT

## 2017-12-24 PROCEDURE — 0 GADOBENATE DIMEGLUMINE 529 MG/ML SOLUTION: Performed by: INTERNAL MEDICINE

## 2017-12-24 PROCEDURE — 72158 MRI LUMBAR SPINE W/O & W/DYE: CPT

## 2017-12-24 PROCEDURE — 85027 COMPLETE CBC AUTOMATED: CPT | Performed by: INTERNAL MEDICINE

## 2017-12-24 PROCEDURE — 83735 ASSAY OF MAGNESIUM: CPT | Performed by: INTERNAL MEDICINE

## 2017-12-24 PROCEDURE — 25010000002 CEFEPIME PER 500 MG: Performed by: INTERNAL MEDICINE

## 2017-12-24 PROCEDURE — 63710000001 INSULIN ASPART PER 5 UNITS: Performed by: INTERNAL MEDICINE

## 2017-12-24 PROCEDURE — 80053 COMPREHEN METABOLIC PANEL: CPT | Performed by: INTERNAL MEDICINE

## 2017-12-24 PROCEDURE — 25010000002 FENTANYL CITRATE (PF) 100 MCG/2ML SOLUTION: Performed by: INTERNAL MEDICINE

## 2017-12-24 PROCEDURE — 82962 GLUCOSE BLOOD TEST: CPT

## 2017-12-24 PROCEDURE — 71010 HC CHEST PA OR AP: CPT

## 2017-12-24 PROCEDURE — 36600 WITHDRAWAL OF ARTERIAL BLOOD: CPT

## 2017-12-24 PROCEDURE — 25010000003 CEFTRIAXONE PER 250 MG: Performed by: INTERNAL MEDICINE

## 2017-12-24 PROCEDURE — 84132 ASSAY OF SERUM POTASSIUM: CPT | Performed by: INTERNAL MEDICINE

## 2017-12-24 PROCEDURE — 99232 SBSQ HOSP IP/OBS MODERATE 35: CPT | Performed by: NEUROLOGICAL SURGERY

## 2017-12-24 PROCEDURE — 82803 BLOOD GASES ANY COMBINATION: CPT

## 2017-12-24 PROCEDURE — 63710000001 INSULIN DETEMER PER 5 UNITS: Performed by: INTERNAL MEDICINE

## 2017-12-24 PROCEDURE — 83605 ASSAY OF LACTIC ACID: CPT | Performed by: INTERNAL MEDICINE

## 2017-12-24 PROCEDURE — A9577 INJ MULTIHANCE: HCPCS | Performed by: INTERNAL MEDICINE

## 2017-12-24 PROCEDURE — 25010000003 POTASSIUM CHLORIDE 10 MEQ/100ML SOLUTION: Performed by: INTERNAL MEDICINE

## 2017-12-24 RX ORDER — CEFTRIAXONE SODIUM 2 G/50ML
2 INJECTION, SOLUTION INTRAVENOUS EVERY 24 HOURS
Status: DISCONTINUED | OUTPATIENT
Start: 2017-12-24 | End: 2017-12-28

## 2017-12-24 RX ADMIN — POTASSIUM CHLORIDE 10 MEQ: 10 INJECTION, SOLUTION INTRAVENOUS at 10:18

## 2017-12-24 RX ADMIN — INSULIN ASPART 2 UNITS: 100 INJECTION, SOLUTION INTRAVENOUS; SUBCUTANEOUS at 21:02

## 2017-12-24 RX ADMIN — CEFTRIAXONE SODIUM 2 G: 2 INJECTION, SOLUTION INTRAVENOUS at 13:38

## 2017-12-24 RX ADMIN — GADOBENATE DIMEGLUMINE 20 ML: 529 INJECTION, SOLUTION INTRAVENOUS at 12:32

## 2017-12-24 RX ADMIN — SODIUM CHLORIDE 200 ML/HR: 9 INJECTION, SOLUTION INTRAVENOUS at 03:22

## 2017-12-24 RX ADMIN — WATER 2 G: 1 INJECTION INTRAMUSCULAR; INTRAVENOUS; SUBCUTANEOUS at 07:10

## 2017-12-24 RX ADMIN — INSULIN ASPART 3 UNITS: 100 INJECTION, SOLUTION INTRAVENOUS; SUBCUTANEOUS at 05:03

## 2017-12-24 RX ADMIN — INSULIN ASPART 3 UNITS: 100 INJECTION, SOLUTION INTRAVENOUS; SUBCUTANEOUS at 08:34

## 2017-12-24 RX ADMIN — POTASSIUM CHLORIDE 10 MEQ: 10 INJECTION, SOLUTION INTRAVENOUS at 09:10

## 2017-12-24 RX ADMIN — INSULIN DETEMIR 15 UNITS: 100 INJECTION, SOLUTION SUBCUTANEOUS at 09:09

## 2017-12-24 RX ADMIN — IPRATROPIUM BROMIDE AND ALBUTEROL SULFATE 3 ML: .5; 3 SOLUTION RESPIRATORY (INHALATION) at 15:38

## 2017-12-24 RX ADMIN — POTASSIUM CHLORIDE 10 MEQ: 10 INJECTION, SOLUTION INTRAVENOUS at 02:34

## 2017-12-24 RX ADMIN — POTASSIUM CHLORIDE 10 MEQ: 10 INJECTION, SOLUTION INTRAVENOUS at 11:22

## 2017-12-24 RX ADMIN — FENTANYL CITRATE 25 MCG: 50 INJECTION INTRAMUSCULAR; INTRAVENOUS at 21:02

## 2017-12-24 RX ADMIN — INSULIN DETEMIR 20 UNITS: 100 INJECTION, SOLUTION SUBCUTANEOUS at 21:02

## 2017-12-24 RX ADMIN — IPRATROPIUM BROMIDE AND ALBUTEROL SULFATE 3 ML: .5; 3 SOLUTION RESPIRATORY (INHALATION) at 20:49

## 2017-12-24 RX ADMIN — FENTANYL CITRATE 25 MCG: 50 INJECTION INTRAMUSCULAR; INTRAVENOUS at 11:40

## 2017-12-24 RX ADMIN — FENTANYL CITRATE 25 MCG: 50 INJECTION INTRAMUSCULAR; INTRAVENOUS at 17:53

## 2017-12-24 RX ADMIN — FENTANYL CITRATE 25 MCG: 50 INJECTION INTRAMUSCULAR; INTRAVENOUS at 09:44

## 2017-12-24 RX ADMIN — FENTANYL CITRATE 25 MCG: 50 INJECTION INTRAMUSCULAR; INTRAVENOUS at 19:55

## 2017-12-24 RX ADMIN — FENTANYL CITRATE 25 MCG: 50 INJECTION INTRAMUSCULAR; INTRAVENOUS at 23:08

## 2017-12-24 RX ADMIN — IPRATROPIUM BROMIDE AND ALBUTEROL SULFATE 3 ML: .5; 3 SOLUTION RESPIRATORY (INHALATION) at 11:39

## 2017-12-24 RX ADMIN — IPRATROPIUM BROMIDE AND ALBUTEROL SULFATE 3 ML: .5; 3 SOLUTION RESPIRATORY (INHALATION) at 07:19

## 2017-12-24 RX ADMIN — INSULIN ASPART 2 UNITS: 100 INJECTION, SOLUTION INTRAVENOUS; SUBCUTANEOUS at 17:28

## 2017-12-24 RX ADMIN — INSULIN ASPART 3 UNITS: 100 INJECTION, SOLUTION INTRAVENOUS; SUBCUTANEOUS at 02:34

## 2017-12-24 RX ADMIN — POTASSIUM CHLORIDE 10 MEQ: 10 INJECTION, SOLUTION INTRAVENOUS at 22:56

## 2017-12-25 LAB
ANION GAP SERPL CALCULATED.3IONS-SCNC: 10.5 MMOL/L
BUN BLD-MCNC: 28 MG/DL (ref 6–20)
BUN/CREAT SERPL: 40 (ref 7–25)
CALCIUM SPEC-SCNC: 8.4 MG/DL (ref 8.6–10.5)
CHLORIDE SERPL-SCNC: 111 MMOL/L (ref 98–107)
CO2 SERPL-SCNC: 24.5 MMOL/L (ref 22–29)
CREAT BLD-MCNC: 0.7 MG/DL (ref 0.76–1.27)
DEPRECATED RDW RBC AUTO: 51.8 FL (ref 37–54)
ERYTHROCYTE [DISTWIDTH] IN BLOOD BY AUTOMATED COUNT: 15 % (ref 11.5–14.5)
GFR SERPL CREATININE-BSD FRML MDRD: 116 ML/MIN/1.73
GLUCOSE BLD-MCNC: 146 MG/DL (ref 65–99)
GLUCOSE BLDC GLUCOMTR-MCNC: 143 MG/DL (ref 70–130)
GLUCOSE BLDC GLUCOMTR-MCNC: 148 MG/DL (ref 70–130)
GLUCOSE BLDC GLUCOMTR-MCNC: 149 MG/DL (ref 70–130)
GLUCOSE BLDC GLUCOMTR-MCNC: 150 MG/DL (ref 70–130)
GLUCOSE BLDC GLUCOMTR-MCNC: 153 MG/DL (ref 70–130)
GLUCOSE BLDC GLUCOMTR-MCNC: 171 MG/DL (ref 70–130)
GLUCOSE BLDC GLUCOMTR-MCNC: 189 MG/DL (ref 70–130)
HCT VFR BLD AUTO: 39.4 % (ref 40.4–52.2)
HGB BLD-MCNC: 12.6 G/DL (ref 13.7–17.6)
MCH RBC QN AUTO: 30.7 PG (ref 27–32.7)
MCHC RBC AUTO-ENTMCNC: 32 G/DL (ref 32.6–36.4)
MCV RBC AUTO: 95.9 FL (ref 79.8–96.2)
PLATELET # BLD AUTO: 236 10*3/MM3 (ref 140–500)
PMV BLD AUTO: 12.3 FL (ref 6–12)
POTASSIUM BLD-SCNC: 3.7 MMOL/L (ref 3.5–5.2)
RBC # BLD AUTO: 4.11 10*6/MM3 (ref 4.6–6)
SODIUM BLD-SCNC: 146 MMOL/L (ref 136–145)
WBC NRBC COR # BLD: 9.33 10*3/MM3 (ref 4.5–10.7)

## 2017-12-25 PROCEDURE — 87522 HEPATITIS C REVRS TRNSCRPJ: CPT | Performed by: INTERNAL MEDICINE

## 2017-12-25 PROCEDURE — 25010000003 POTASSIUM CHLORIDE 10 MEQ/100ML SOLUTION: Performed by: INTERNAL MEDICINE

## 2017-12-25 PROCEDURE — 25010000002 FENTANYL CITRATE (PF) 100 MCG/2ML SOLUTION: Performed by: INTERNAL MEDICINE

## 2017-12-25 PROCEDURE — 99233 SBSQ HOSP IP/OBS HIGH 50: CPT | Performed by: INTERNAL MEDICINE

## 2017-12-25 PROCEDURE — 80048 BASIC METABOLIC PNL TOTAL CA: CPT | Performed by: INTERNAL MEDICINE

## 2017-12-25 PROCEDURE — 85027 COMPLETE CBC AUTOMATED: CPT | Performed by: INTERNAL MEDICINE

## 2017-12-25 PROCEDURE — 94799 UNLISTED PULMONARY SVC/PX: CPT

## 2017-12-25 PROCEDURE — 94760 N-INVAS EAR/PLS OXIMETRY 1: CPT

## 2017-12-25 PROCEDURE — 87902 NFCT AGT GNTYP ALYS HEP C: CPT | Performed by: INTERNAL MEDICINE

## 2017-12-25 PROCEDURE — 63710000001 INSULIN ASPART PER 5 UNITS: Performed by: INTERNAL MEDICINE

## 2017-12-25 PROCEDURE — 99231 SBSQ HOSP IP/OBS SF/LOW 25: CPT | Performed by: NEUROLOGICAL SURGERY

## 2017-12-25 PROCEDURE — 25010000003 CEFTRIAXONE PER 250 MG: Performed by: INTERNAL MEDICINE

## 2017-12-25 PROCEDURE — 82962 GLUCOSE BLOOD TEST: CPT

## 2017-12-25 RX ORDER — FENTANYL CITRATE 50 UG/ML
50 INJECTION, SOLUTION INTRAMUSCULAR; INTRAVENOUS
Status: DISCONTINUED | OUTPATIENT
Start: 2017-12-25 | End: 2017-12-26

## 2017-12-25 RX ORDER — FENTANYL CITRATE 50 UG/ML
50 INJECTION, SOLUTION INTRAMUSCULAR; INTRAVENOUS
Status: DISCONTINUED | OUTPATIENT
Start: 2017-12-25 | End: 2017-12-25

## 2017-12-25 RX ADMIN — INSULIN ASPART 2 UNITS: 100 INJECTION, SOLUTION INTRAVENOUS; SUBCUTANEOUS at 00:19

## 2017-12-25 RX ADMIN — FENTANYL CITRATE 50 MCG: 50 INJECTION INTRAMUSCULAR; INTRAVENOUS at 06:37

## 2017-12-25 RX ADMIN — FENTANYL CITRATE 50 MCG: 50 INJECTION INTRAMUSCULAR; INTRAVENOUS at 11:01

## 2017-12-25 RX ADMIN — SODIUM CHLORIDE 100 ML/HR: 9 INJECTION, SOLUTION INTRAVENOUS at 01:33

## 2017-12-25 RX ADMIN — POTASSIUM CHLORIDE 10 MEQ: 10 INJECTION, SOLUTION INTRAVENOUS at 00:19

## 2017-12-25 RX ADMIN — FENTANYL CITRATE 25 MCG: 50 INJECTION INTRAMUSCULAR; INTRAVENOUS at 01:05

## 2017-12-25 RX ADMIN — FENTANYL CITRATE 50 MCG: 50 INJECTION INTRAMUSCULAR; INTRAVENOUS at 02:10

## 2017-12-25 RX ADMIN — FENTANYL CITRATE 50 MCG: 50 INJECTION INTRAMUSCULAR; INTRAVENOUS at 04:33

## 2017-12-25 RX ADMIN — IPRATROPIUM BROMIDE AND ALBUTEROL SULFATE 3 ML: .5; 3 SOLUTION RESPIRATORY (INHALATION) at 11:40

## 2017-12-25 RX ADMIN — FENTANYL CITRATE 50 MCG: 50 INJECTION INTRAMUSCULAR; INTRAVENOUS at 15:09

## 2017-12-25 RX ADMIN — FENTANYL CITRATE 25 MCG: 50 INJECTION INTRAMUSCULAR; INTRAVENOUS at 00:18

## 2017-12-25 RX ADMIN — FENTANYL CITRATE 50 MCG: 50 INJECTION INTRAMUSCULAR; INTRAVENOUS at 12:54

## 2017-12-25 RX ADMIN — INSULIN ASPART 2 UNITS: 100 INJECTION, SOLUTION INTRAVENOUS; SUBCUTANEOUS at 12:50

## 2017-12-25 RX ADMIN — IPRATROPIUM BROMIDE AND ALBUTEROL SULFATE 3 ML: .5; 3 SOLUTION RESPIRATORY (INHALATION) at 20:08

## 2017-12-25 RX ADMIN — CEFTRIAXONE SODIUM 2 G: 2 INJECTION, SOLUTION INTRAVENOUS at 12:55

## 2017-12-25 RX ADMIN — POTASSIUM CHLORIDE 10 MEQ: 10 INJECTION, SOLUTION INTRAVENOUS at 01:35

## 2017-12-25 RX ADMIN — IPRATROPIUM BROMIDE AND ALBUTEROL SULFATE 3 ML: .5; 3 SOLUTION RESPIRATORY (INHALATION) at 07:53

## 2017-12-25 RX ADMIN — INSULIN ASPART 2 UNITS: 100 INJECTION, SOLUTION INTRAVENOUS; SUBCUTANEOUS at 21:42

## 2017-12-25 RX ADMIN — FENTANYL CITRATE 50 MCG: 50 INJECTION INTRAMUSCULAR; INTRAVENOUS at 01:32

## 2017-12-25 RX ADMIN — INSULIN DETEMIR 20 UNITS: 100 INJECTION, SOLUTION SUBCUTANEOUS at 21:42

## 2017-12-25 RX ADMIN — INSULIN DETEMIR 20 UNITS: 100 INJECTION, SOLUTION SUBCUTANEOUS at 10:00

## 2017-12-25 RX ADMIN — IPRATROPIUM BROMIDE AND ALBUTEROL SULFATE 3 ML: .5; 3 SOLUTION RESPIRATORY (INHALATION) at 14:50

## 2017-12-26 ENCOUNTER — APPOINTMENT (OUTPATIENT)
Dept: GENERAL RADIOLOGY | Facility: HOSPITAL | Age: 57
End: 2017-12-26
Attending: INTERNAL MEDICINE

## 2017-12-26 ENCOUNTER — APPOINTMENT (OUTPATIENT)
Dept: CT IMAGING | Facility: HOSPITAL | Age: 57
End: 2017-12-26

## 2017-12-26 LAB
C PEPTIDE SERPL-MCNC: 3.7 NG/ML (ref 1.1–4.4)
GLUCOSE BLDC GLUCOMTR-MCNC: 123 MG/DL (ref 70–130)
GLUCOSE BLDC GLUCOMTR-MCNC: 138 MG/DL (ref 70–130)
GLUCOSE BLDC GLUCOMTR-MCNC: 141 MG/DL (ref 70–130)
GLUCOSE BLDC GLUCOMTR-MCNC: 177 MG/DL (ref 70–130)
GLUCOSE BLDC GLUCOMTR-MCNC: 245 MG/DL (ref 70–130)

## 2017-12-26 PROCEDURE — 99232 SBSQ HOSP IP/OBS MODERATE 35: CPT | Performed by: NEUROLOGICAL SURGERY

## 2017-12-26 PROCEDURE — 94799 UNLISTED PULMONARY SVC/PX: CPT

## 2017-12-26 PROCEDURE — 93005 ELECTROCARDIOGRAM TRACING: CPT | Performed by: INTERNAL MEDICINE

## 2017-12-26 PROCEDURE — 92610 EVALUATE SWALLOWING FUNCTION: CPT | Performed by: SPEECH-LANGUAGE PATHOLOGIST

## 2017-12-26 PROCEDURE — 71010 HC CHEST PA OR AP: CPT

## 2017-12-26 PROCEDURE — 73201 CT UPPER EXTREMITY W/DYE: CPT

## 2017-12-26 PROCEDURE — 25010000003 CEFTRIAXONE PER 250 MG: Performed by: INTERNAL MEDICINE

## 2017-12-26 PROCEDURE — 63710000001 INSULIN ASPART PER 5 UNITS: Performed by: INTERNAL MEDICINE

## 2017-12-26 PROCEDURE — 25010000002 ENOXAPARIN PER 10 MG: Performed by: INTERNAL MEDICINE

## 2017-12-26 PROCEDURE — 99233 SBSQ HOSP IP/OBS HIGH 50: CPT | Performed by: INTERNAL MEDICINE

## 2017-12-26 PROCEDURE — 93010 ELECTROCARDIOGRAM REPORT: CPT | Performed by: INTERNAL MEDICINE

## 2017-12-26 PROCEDURE — 99222 1ST HOSP IP/OBS MODERATE 55: CPT | Performed by: INTERNAL MEDICINE

## 2017-12-26 PROCEDURE — 97110 THERAPEUTIC EXERCISES: CPT

## 2017-12-26 PROCEDURE — 97162 PT EVAL MOD COMPLEX 30 MIN: CPT

## 2017-12-26 PROCEDURE — 82962 GLUCOSE BLOOD TEST: CPT

## 2017-12-26 PROCEDURE — 25010000002 FENTANYL CITRATE (PF) 100 MCG/2ML SOLUTION: Performed by: INTERNAL MEDICINE

## 2017-12-26 PROCEDURE — 0 IOPAMIDOL 61 % SOLUTION: Performed by: INTERNAL MEDICINE

## 2017-12-26 RX ORDER — FAMOTIDINE 10 MG/ML
20 INJECTION, SOLUTION INTRAVENOUS EVERY 12 HOURS SCHEDULED
Status: DISCONTINUED | OUTPATIENT
Start: 2017-12-26 | End: 2017-12-27 | Stop reason: CLARIF

## 2017-12-26 RX ORDER — MORPHINE SULFATE 15 MG/1
15 TABLET, FILM COATED, EXTENDED RELEASE ORAL EVERY 12 HOURS SCHEDULED
Status: DISCONTINUED | OUTPATIENT
Start: 2017-12-26 | End: 2017-12-31

## 2017-12-26 RX ORDER — OXYCODONE HYDROCHLORIDE 15 MG/1
15 TABLET ORAL EVERY 6 HOURS PRN
Status: DISPENSED | OUTPATIENT
Start: 2017-12-26 | End: 2018-01-05

## 2017-12-26 RX ADMIN — IPRATROPIUM BROMIDE AND ALBUTEROL SULFATE 3 ML: .5; 3 SOLUTION RESPIRATORY (INHALATION) at 20:18

## 2017-12-26 RX ADMIN — CEFTRIAXONE SODIUM 2 G: 2 INJECTION, SOLUTION INTRAVENOUS at 13:43

## 2017-12-26 RX ADMIN — IPRATROPIUM BROMIDE AND ALBUTEROL SULFATE 3 ML: .5; 3 SOLUTION RESPIRATORY (INHALATION) at 11:04

## 2017-12-26 RX ADMIN — IPRATROPIUM BROMIDE AND ALBUTEROL SULFATE 3 ML: .5; 3 SOLUTION RESPIRATORY (INHALATION) at 06:51

## 2017-12-26 RX ADMIN — IPRATROPIUM BROMIDE AND ALBUTEROL SULFATE 3 ML: .5; 3 SOLUTION RESPIRATORY (INHALATION) at 15:09

## 2017-12-26 RX ADMIN — FENTANYL CITRATE 50 MCG: 50 INJECTION INTRAMUSCULAR; INTRAVENOUS at 08:30

## 2017-12-26 RX ADMIN — FENTANYL CITRATE 50 MCG: 50 INJECTION INTRAMUSCULAR; INTRAVENOUS at 00:53

## 2017-12-26 RX ADMIN — OXYCODONE HYDROCHLORIDE 15 MG: 15 TABLET ORAL at 16:21

## 2017-12-26 RX ADMIN — INSULIN DETEMIR 20 UNITS: 100 INJECTION, SOLUTION SUBCUTANEOUS at 21:05

## 2017-12-26 RX ADMIN — METOPROLOL TARTRATE 25 MG: 25 TABLET ORAL at 15:46

## 2017-12-26 RX ADMIN — MORPHINE SULFATE 15 MG: 15 TABLET, EXTENDED RELEASE ORAL at 12:40

## 2017-12-26 RX ADMIN — OXYCODONE HYDROCHLORIDE 15 MG: 15 TABLET ORAL at 22:34

## 2017-12-26 RX ADMIN — ENOXAPARIN SODIUM 40 MG: 40 INJECTION SUBCUTANEOUS at 21:00

## 2017-12-26 RX ADMIN — FAMOTIDINE 20 MG: 10 INJECTION, SOLUTION INTRAVENOUS at 21:00

## 2017-12-26 RX ADMIN — INSULIN ASPART 2 UNITS: 100 INJECTION, SOLUTION INTRAVENOUS; SUBCUTANEOUS at 17:48

## 2017-12-26 RX ADMIN — INSULIN ASPART 3 UNITS: 100 INJECTION, SOLUTION INTRAVENOUS; SUBCUTANEOUS at 21:01

## 2017-12-26 RX ADMIN — MORPHINE SULFATE 15 MG: 15 TABLET, EXTENDED RELEASE ORAL at 21:01

## 2017-12-26 RX ADMIN — METOPROLOL TARTRATE 25 MG: 25 TABLET ORAL at 21:01

## 2017-12-26 RX ADMIN — FAMOTIDINE 20 MG: 10 INJECTION, SOLUTION INTRAVENOUS at 17:48

## 2017-12-26 RX ADMIN — IOPAMIDOL 85 ML: 612 INJECTION, SOLUTION INTRAVENOUS at 10:45

## 2017-12-27 ENCOUNTER — APPOINTMENT (OUTPATIENT)
Dept: CARDIOLOGY | Facility: HOSPITAL | Age: 57
End: 2017-12-27
Attending: INTERNAL MEDICINE

## 2017-12-27 LAB
ALBUMIN SERPL-MCNC: 1.9 G/DL (ref 3.5–5.2)
ALBUMIN/GLOB SERPL: 0.5 G/DL
ALP SERPL-CCNC: 75 U/L (ref 39–117)
ALT SERPL W P-5'-P-CCNC: 43 U/L (ref 1–41)
ANION GAP SERPL CALCULATED.3IONS-SCNC: 11.6 MMOL/L
AST SERPL-CCNC: 31 U/L (ref 1–40)
BH CV ECHO MEAS - BSA(HAYCOCK): 2.7 M^2
BH CV ECHO MEAS - BSA: 2.6 M^2
BH CV ECHO MEAS - BZI_BMI: 33.2 KILOGRAMS/M^2
BH CV ECHO MEAS - BZI_METRIC_HEIGHT: 195.6 CM
BH CV ECHO MEAS - BZI_METRIC_WEIGHT: 127 KG
BH CV VAS BP RIGHT ARM: NORMAL MMHG
BILIRUB SERPL-MCNC: 0.6 MG/DL (ref 0.1–1.2)
BUN BLD-MCNC: 20 MG/DL (ref 6–20)
BUN/CREAT SERPL: 30.3 (ref 7–25)
CALCIUM SPEC-SCNC: 8.1 MG/DL (ref 8.6–10.5)
CHLORIDE SERPL-SCNC: 115 MMOL/L (ref 98–107)
CO2 SERPL-SCNC: 23.4 MMOL/L (ref 22–29)
CREAT BLD-MCNC: 0.66 MG/DL (ref 0.76–1.27)
CRP SERPL-MCNC: 4.93 MG/DL (ref 0–0.5)
DEPRECATED RDW RBC AUTO: 55.6 FL (ref 37–54)
ERYTHROCYTE [DISTWIDTH] IN BLOOD BY AUTOMATED COUNT: 15.3 % (ref 11.5–14.5)
GAD65 AB SER-ACNC: <5 U/ML (ref 0–5)
GFR SERPL CREATININE-BSD FRML MDRD: 124 ML/MIN/1.73
GLOBULIN UR ELPH-MCNC: 4 GM/DL
GLUCOSE BLD-MCNC: 148 MG/DL (ref 65–99)
GLUCOSE BLDC GLUCOMTR-MCNC: 114 MG/DL (ref 70–130)
GLUCOSE BLDC GLUCOMTR-MCNC: 133 MG/DL (ref 70–130)
GLUCOSE BLDC GLUCOMTR-MCNC: 156 MG/DL (ref 70–130)
GLUCOSE BLDC GLUCOMTR-MCNC: 203 MG/DL (ref 70–130)
GLUCOSE BLDC GLUCOMTR-MCNC: 270 MG/DL (ref 70–130)
HCT VFR BLD AUTO: 39.4 % (ref 40.4–52.2)
HGB BLD-MCNC: 12.1 G/DL (ref 13.7–17.6)
LV EF 2D ECHO EST: 60 %
MAGNESIUM SERPL-MCNC: 2.4 MG/DL (ref 1.6–2.6)
MCH RBC QN AUTO: 30.6 PG (ref 27–32.7)
MCHC RBC AUTO-ENTMCNC: 30.7 G/DL (ref 32.6–36.4)
MCV RBC AUTO: 99.7 FL (ref 79.8–96.2)
PLATELET # BLD AUTO: 243 10*3/MM3 (ref 140–500)
PMV BLD AUTO: 10.9 FL (ref 6–12)
POTASSIUM BLD-SCNC: 3.3 MMOL/L (ref 3.5–5.2)
POTASSIUM BLD-SCNC: 3.5 MMOL/L (ref 3.5–5.2)
PROT SERPL-MCNC: 5.9 G/DL (ref 6–8.5)
RBC # BLD AUTO: 3.95 10*6/MM3 (ref 4.6–6)
SODIUM BLD-SCNC: 150 MMOL/L (ref 136–145)
WBC NRBC COR # BLD: 8.36 10*3/MM3 (ref 4.5–10.7)

## 2017-12-27 PROCEDURE — 93325 DOPPLER ECHO COLOR FLOW MAPG: CPT

## 2017-12-27 PROCEDURE — 93312 ECHO TRANSESOPHAGEAL: CPT | Performed by: INTERNAL MEDICINE

## 2017-12-27 PROCEDURE — 97110 THERAPEUTIC EXERCISES: CPT

## 2017-12-27 PROCEDURE — 63710000001 INSULIN ASPART PER 5 UNITS: Performed by: INTERNAL MEDICINE

## 2017-12-27 PROCEDURE — 85027 COMPLETE CBC AUTOMATED: CPT | Performed by: INTERNAL MEDICINE

## 2017-12-27 PROCEDURE — 99232 SBSQ HOSP IP/OBS MODERATE 35: CPT | Performed by: INTERNAL MEDICINE

## 2017-12-27 PROCEDURE — 93312 ECHO TRANSESOPHAGEAL: CPT

## 2017-12-27 PROCEDURE — 80053 COMPREHEN METABOLIC PANEL: CPT | Performed by: INTERNAL MEDICINE

## 2017-12-27 PROCEDURE — 99152 MOD SED SAME PHYS/QHP 5/>YRS: CPT

## 2017-12-27 PROCEDURE — 94799 UNLISTED PULMONARY SVC/PX: CPT

## 2017-12-27 PROCEDURE — B24BZZ4 ULTRASONOGRAPHY OF HEART WITH AORTA, TRANSESOPHAGEAL: ICD-10-PCS | Performed by: INTERNAL MEDICINE

## 2017-12-27 PROCEDURE — 25010000002 FENTANYL CITRATE (PF) 100 MCG/2ML SOLUTION: Performed by: INTERNAL MEDICINE

## 2017-12-27 PROCEDURE — 86140 C-REACTIVE PROTEIN: CPT | Performed by: INTERNAL MEDICINE

## 2017-12-27 PROCEDURE — 83735 ASSAY OF MAGNESIUM: CPT | Performed by: INTERNAL MEDICINE

## 2017-12-27 PROCEDURE — 84132 ASSAY OF SERUM POTASSIUM: CPT | Performed by: INTERNAL MEDICINE

## 2017-12-27 PROCEDURE — 93005 ELECTROCARDIOGRAM TRACING: CPT | Performed by: INTERNAL MEDICINE

## 2017-12-27 PROCEDURE — 93325 DOPPLER ECHO COLOR FLOW MAPG: CPT | Performed by: INTERNAL MEDICINE

## 2017-12-27 PROCEDURE — 93320 DOPPLER ECHO COMPLETE: CPT | Performed by: INTERNAL MEDICINE

## 2017-12-27 PROCEDURE — 25010000003 POTASSIUM CHLORIDE 10 MEQ/100ML SOLUTION: Performed by: INTERNAL MEDICINE

## 2017-12-27 PROCEDURE — 25010000003 CEFTRIAXONE PER 250 MG: Performed by: INTERNAL MEDICINE

## 2017-12-27 PROCEDURE — 99233 SBSQ HOSP IP/OBS HIGH 50: CPT | Performed by: INTERNAL MEDICINE

## 2017-12-27 PROCEDURE — 99153 MOD SED SAME PHYS/QHP EA: CPT

## 2017-12-27 PROCEDURE — 82962 GLUCOSE BLOOD TEST: CPT

## 2017-12-27 PROCEDURE — 93320 DOPPLER ECHO COMPLETE: CPT

## 2017-12-27 PROCEDURE — 93010 ELECTROCARDIOGRAM REPORT: CPT | Performed by: INTERNAL MEDICINE

## 2017-12-27 PROCEDURE — 25010000002 ENOXAPARIN PER 10 MG: Performed by: INTERNAL MEDICINE

## 2017-12-27 PROCEDURE — 25010000002 MIDAZOLAM PER 1 MG: Performed by: INTERNAL MEDICINE

## 2017-12-27 RX ORDER — FENTANYL CITRATE 50 UG/ML
INJECTION, SOLUTION INTRAMUSCULAR; INTRAVENOUS
Status: COMPLETED | OUTPATIENT
Start: 2017-12-27 | End: 2017-12-27

## 2017-12-27 RX ORDER — FAMOTIDINE 20 MG/1
20 TABLET, FILM COATED ORAL 2 TIMES DAILY
Status: DISCONTINUED | OUTPATIENT
Start: 2017-12-27 | End: 2017-12-27

## 2017-12-27 RX ORDER — ASPIRIN 81 MG/1
81 TABLET ORAL DAILY
Status: DISCONTINUED | OUTPATIENT
Start: 2017-12-28 | End: 2017-12-30

## 2017-12-27 RX ORDER — ATORVASTATIN CALCIUM 20 MG/1
40 TABLET, FILM COATED ORAL DAILY
Status: DISCONTINUED | OUTPATIENT
Start: 2017-12-28 | End: 2018-01-11 | Stop reason: HOSPADM

## 2017-12-27 RX ORDER — MIDAZOLAM HYDROCHLORIDE 1 MG/ML
INJECTION INTRAMUSCULAR; INTRAVENOUS
Status: COMPLETED | OUTPATIENT
Start: 2017-12-27 | End: 2017-12-27

## 2017-12-27 RX ADMIN — IPRATROPIUM BROMIDE AND ALBUTEROL SULFATE 3 ML: .5; 3 SOLUTION RESPIRATORY (INHALATION) at 08:05

## 2017-12-27 RX ADMIN — POTASSIUM CHLORIDE 40 MEQ: 750 CAPSULE, EXTENDED RELEASE ORAL at 23:16

## 2017-12-27 RX ADMIN — METOPROLOL TARTRATE 25 MG: 25 TABLET ORAL at 20:23

## 2017-12-27 RX ADMIN — Medication 2 MG: at 11:29

## 2017-12-27 RX ADMIN — Medication 2 MG: at 11:34

## 2017-12-27 RX ADMIN — LIDOCAINE HYDROCHLORIDE 10 ML: 20 SOLUTION ORAL; TOPICAL at 11:23

## 2017-12-27 RX ADMIN — POTASSIUM CHLORIDE 10 MEQ: 10 INJECTION, SOLUTION INTRAVENOUS at 02:41

## 2017-12-27 RX ADMIN — POTASSIUM CHLORIDE 40 MEQ: 750 CAPSULE, EXTENDED RELEASE ORAL at 18:53

## 2017-12-27 RX ADMIN — IPRATROPIUM BROMIDE AND ALBUTEROL SULFATE 3 ML: .5; 3 SOLUTION RESPIRATORY (INHALATION) at 21:06

## 2017-12-27 RX ADMIN — Medication 2 MG: at 11:40

## 2017-12-27 RX ADMIN — INSULIN ASPART 4 UNITS: 100 INJECTION, SOLUTION INTRAVENOUS; SUBCUTANEOUS at 00:56

## 2017-12-27 RX ADMIN — IPRATROPIUM BROMIDE AND ALBUTEROL SULFATE 3 ML: .5; 3 SOLUTION RESPIRATORY (INHALATION) at 11:56

## 2017-12-27 RX ADMIN — IPRATROPIUM BROMIDE AND ALBUTEROL SULFATE 3 ML: .5; 3 SOLUTION RESPIRATORY (INHALATION) at 15:20

## 2017-12-27 RX ADMIN — INSULIN DETEMIR 20 UNITS: 100 INJECTION, SOLUTION SUBCUTANEOUS at 08:49

## 2017-12-27 RX ADMIN — Medication 2 MG: at 11:33

## 2017-12-27 RX ADMIN — MORPHINE SULFATE 15 MG: 15 TABLET, EXTENDED RELEASE ORAL at 08:42

## 2017-12-27 RX ADMIN — FENTANYL CITRATE 25 MCG: 50 INJECTION INTRAMUSCULAR; INTRAVENOUS at 11:34

## 2017-12-27 RX ADMIN — OXYCODONE HYDROCHLORIDE 15 MG: 15 TABLET ORAL at 04:36

## 2017-12-27 RX ADMIN — METOPROLOL TARTRATE 25 MG: 25 TABLET ORAL at 21:53

## 2017-12-27 RX ADMIN — Medication 2 MG: at 11:44

## 2017-12-27 RX ADMIN — FAMOTIDINE 20 MG: 10 INJECTION, SOLUTION INTRAVENOUS at 08:43

## 2017-12-27 RX ADMIN — POTASSIUM CHLORIDE 10 MEQ: 10 INJECTION, SOLUTION INTRAVENOUS at 01:00

## 2017-12-27 RX ADMIN — FENTANYL CITRATE 25 MCG: 50 INJECTION INTRAMUSCULAR; INTRAVENOUS at 11:36

## 2017-12-27 RX ADMIN — FENTANYL CITRATE 25 MCG: 50 INJECTION INTRAMUSCULAR; INTRAVENOUS at 11:33

## 2017-12-27 RX ADMIN — MORPHINE SULFATE 15 MG: 15 TABLET, EXTENDED RELEASE ORAL at 20:23

## 2017-12-27 RX ADMIN — OXYCODONE HYDROCHLORIDE 15 MG: 15 TABLET ORAL at 22:46

## 2017-12-27 RX ADMIN — ENOXAPARIN SODIUM 40 MG: 40 INJECTION SUBCUTANEOUS at 20:24

## 2017-12-27 RX ADMIN — INSULIN ASPART 2 UNITS: 100 INJECTION, SOLUTION INTRAVENOUS; SUBCUTANEOUS at 04:58

## 2017-12-27 RX ADMIN — Medication 2 MG: at 11:36

## 2017-12-27 RX ADMIN — METOPROLOL TARTRATE 25 MG: 25 TABLET ORAL at 08:42

## 2017-12-27 RX ADMIN — INSULIN DETEMIR 20 UNITS: 100 INJECTION, SOLUTION SUBCUTANEOUS at 20:24

## 2017-12-27 RX ADMIN — INSULIN ASPART 3 UNITS: 100 INJECTION, SOLUTION INTRAVENOUS; SUBCUTANEOUS at 20:23

## 2017-12-27 RX ADMIN — FENTANYL CITRATE 25 MCG: 50 INJECTION INTRAMUSCULAR; INTRAVENOUS at 11:29

## 2017-12-27 RX ADMIN — FENTANYL CITRATE 25 MCG: 50 INJECTION INTRAMUSCULAR; INTRAVENOUS at 11:40

## 2017-12-27 RX ADMIN — POTASSIUM CHLORIDE 40 MEQ: 750 CAPSULE, EXTENDED RELEASE ORAL at 08:42

## 2017-12-27 RX ADMIN — CEFTRIAXONE SODIUM 2 G: 2 INJECTION, SOLUTION INTRAVENOUS at 12:31

## 2017-12-28 ENCOUNTER — APPOINTMENT (OUTPATIENT)
Dept: GENERAL RADIOLOGY | Facility: HOSPITAL | Age: 57
End: 2017-12-28

## 2017-12-28 ENCOUNTER — TELEPHONE (OUTPATIENT)
Dept: NEUROSURGERY | Facility: CLINIC | Age: 57
End: 2017-12-28

## 2017-12-28 LAB
ALBUMIN SERPL-MCNC: 2 G/DL (ref 3.5–5.2)
ALBUMIN/GLOB SERPL: 0.5 G/DL
ALP SERPL-CCNC: 73 U/L (ref 39–117)
ALT SERPL W P-5'-P-CCNC: 40 U/L (ref 1–41)
ANION GAP SERPL CALCULATED.3IONS-SCNC: 9.6 MMOL/L
AST SERPL-CCNC: 33 U/L (ref 1–40)
BACTERIA SPEC AEROBE CULT: NORMAL
BACTERIA SPEC AEROBE CULT: NORMAL
BILIRUB SERPL-MCNC: 0.7 MG/DL (ref 0.1–1.2)
BUN BLD-MCNC: 15 MG/DL (ref 6–20)
BUN/CREAT SERPL: 30 (ref 7–25)
CALCIUM SPEC-SCNC: 8.3 MG/DL (ref 8.6–10.5)
CHLORIDE SERPL-SCNC: 110 MMOL/L (ref 98–107)
CHOLEST SERPL-MCNC: 65 MG/DL (ref 0–200)
CO2 SERPL-SCNC: 23.4 MMOL/L (ref 22–29)
CREAT BLD-MCNC: 0.5 MG/DL (ref 0.76–1.27)
GFR SERPL CREATININE-BSD FRML MDRD: >150 ML/MIN/1.73
GLOBULIN UR ELPH-MCNC: 4 GM/DL
GLUCOSE BLD-MCNC: 129 MG/DL (ref 65–99)
GLUCOSE BLDC GLUCOMTR-MCNC: 123 MG/DL (ref 70–130)
GLUCOSE BLDC GLUCOMTR-MCNC: 134 MG/DL (ref 70–130)
GLUCOSE BLDC GLUCOMTR-MCNC: 207 MG/DL (ref 70–130)
GLUCOSE BLDC GLUCOMTR-MCNC: 230 MG/DL (ref 70–130)
HCV GENTYP SERPL NAA+PROBE: NORMAL
HCV GENTYP SERPL NAA+PROBE: NORMAL
HCV RNA SERPL NAA+PROBE-ACNC: NORMAL IU/ML
HCV RNA SERPL NAA+PROBE-LOG IU: 6.8 LOG10 IU/ML
HDLC SERPL-MCNC: 14 MG/DL (ref 40–60)
LDLC SERPL CALC-MCNC: 29 MG/DL (ref 0–100)
LDLC/HDLC SERPL: 2.07 {RATIO}
Lab: NORMAL
POTASSIUM BLD-SCNC: 3.7 MMOL/L (ref 3.5–5.2)
PROT SERPL-MCNC: 6 G/DL (ref 6–8.5)
REF LAB TEST REF RANGE: NORMAL
SODIUM BLD-SCNC: 143 MMOL/L (ref 136–145)
TRIGL SERPL-MCNC: 110 MG/DL (ref 0–150)
TSH SERPL DL<=0.05 MIU/L-ACNC: 3.91 MIU/ML (ref 0.27–4.2)
VLDLC SERPL-MCNC: 22 MG/DL (ref 5–40)

## 2017-12-28 PROCEDURE — 80053 COMPREHEN METABOLIC PANEL: CPT | Performed by: INTERNAL MEDICINE

## 2017-12-28 PROCEDURE — 74230 X-RAY XM SWLNG FUNCJ C+: CPT

## 2017-12-28 PROCEDURE — 94799 UNLISTED PULMONARY SVC/PX: CPT

## 2017-12-28 PROCEDURE — 82962 GLUCOSE BLOOD TEST: CPT

## 2017-12-28 PROCEDURE — 25010000003 PENICILLIN G POTASSIUM PER 600000 UNITS: Performed by: INTERNAL MEDICINE

## 2017-12-28 PROCEDURE — 80061 LIPID PANEL: CPT | Performed by: INTERNAL MEDICINE

## 2017-12-28 PROCEDURE — 84443 ASSAY THYROID STIM HORMONE: CPT | Performed by: INTERNAL MEDICINE

## 2017-12-28 PROCEDURE — 25010000002 ENOXAPARIN PER 10 MG: Performed by: INTERNAL MEDICINE

## 2017-12-28 PROCEDURE — 99232 SBSQ HOSP IP/OBS MODERATE 35: CPT | Performed by: INTERNAL MEDICINE

## 2017-12-28 PROCEDURE — 63710000001 INSULIN ASPART PER 5 UNITS: Performed by: INTERNAL MEDICINE

## 2017-12-28 PROCEDURE — 92611 MOTION FLUOROSCOPY/SWALLOW: CPT

## 2017-12-28 PROCEDURE — 97110 THERAPEUTIC EXERCISES: CPT | Performed by: PHYSICAL THERAPIST

## 2017-12-28 RX ORDER — METOPROLOL TARTRATE 50 MG/1
50 TABLET, FILM COATED ORAL EVERY 12 HOURS SCHEDULED
Status: DISCONTINUED | OUTPATIENT
Start: 2017-12-28 | End: 2017-12-29

## 2017-12-28 RX ADMIN — OXYCODONE HYDROCHLORIDE 15 MG: 15 TABLET ORAL at 18:46

## 2017-12-28 RX ADMIN — METOPROLOL TARTRATE 50 MG: 50 TABLET, FILM COATED ORAL at 21:14

## 2017-12-28 RX ADMIN — IPRATROPIUM BROMIDE AND ALBUTEROL SULFATE 3 ML: .5; 3 SOLUTION RESPIRATORY (INHALATION) at 10:32

## 2017-12-28 RX ADMIN — ASPIRIN 81 MG: 81 TABLET ORAL at 10:00

## 2017-12-28 RX ADMIN — MORPHINE SULFATE 15 MG: 15 TABLET, EXTENDED RELEASE ORAL at 10:00

## 2017-12-28 RX ADMIN — SODIUM CHLORIDE 4 MILLION UNITS: 0.9 INJECTION INTRAVENOUS at 16:19

## 2017-12-28 RX ADMIN — SODIUM CHLORIDE 4 MILLION UNITS: 0.9 INJECTION INTRAVENOUS at 12:02

## 2017-12-28 RX ADMIN — METOPROLOL TARTRATE 25 MG: 25 TABLET ORAL at 10:00

## 2017-12-28 RX ADMIN — ATORVASTATIN CALCIUM 40 MG: 20 TABLET, FILM COATED ORAL at 10:00

## 2017-12-28 RX ADMIN — MORPHINE SULFATE 15 MG: 15 TABLET, EXTENDED RELEASE ORAL at 21:14

## 2017-12-28 RX ADMIN — IPRATROPIUM BROMIDE AND ALBUTEROL SULFATE 3 ML: .5; 3 SOLUTION RESPIRATORY (INHALATION) at 15:01

## 2017-12-28 RX ADMIN — IPRATROPIUM BROMIDE AND ALBUTEROL SULFATE 3 ML: .5; 3 SOLUTION RESPIRATORY (INHALATION) at 21:51

## 2017-12-28 RX ADMIN — ENOXAPARIN SODIUM 40 MG: 40 INJECTION SUBCUTANEOUS at 21:14

## 2017-12-28 RX ADMIN — INSULIN ASPART 3 UNITS: 100 INJECTION, SOLUTION INTRAVENOUS; SUBCUTANEOUS at 21:29

## 2017-12-28 RX ADMIN — OXYCODONE HYDROCHLORIDE 15 MG: 15 TABLET ORAL at 12:01

## 2017-12-28 RX ADMIN — INSULIN DETEMIR 20 UNITS: 100 INJECTION, SOLUTION SUBCUTANEOUS at 09:59

## 2017-12-28 RX ADMIN — INSULIN DETEMIR 20 UNITS: 100 INJECTION, SOLUTION SUBCUTANEOUS at 21:30

## 2017-12-28 RX ADMIN — IPRATROPIUM BROMIDE AND ALBUTEROL SULFATE 3 ML: .5; 3 SOLUTION RESPIRATORY (INHALATION) at 07:00

## 2017-12-28 RX ADMIN — SODIUM CHLORIDE 4 MILLION UNITS: 0.9 INJECTION INTRAVENOUS at 21:14

## 2017-12-28 NOTE — TELEPHONE ENCOUNTER
Per JEH: will need hospital follow-up in about 3-4 weeks w/ APRN, no new imaging. (back pain, r/o epidural abcess - treated by ID for GBS infection w/IV antibiotics). Prior MRI at [facility].     Appt made for 1-22 at 2:00 w/Manuel Starks on [facility] notified. Letter/forms mailed to home.

## 2017-12-29 ENCOUNTER — APPOINTMENT (OUTPATIENT)
Dept: MRI IMAGING | Facility: HOSPITAL | Age: 57
End: 2017-12-29
Attending: INTERNAL MEDICINE

## 2017-12-29 ENCOUNTER — APPOINTMENT (OUTPATIENT)
Dept: CARDIOLOGY | Facility: HOSPITAL | Age: 57
End: 2017-12-29
Attending: INTERNAL MEDICINE

## 2017-12-29 PROBLEM — R93.7 ABNORMAL MRI, LUMBAR SPINE: Status: ACTIVE | Noted: 2017-12-29

## 2017-12-29 PROBLEM — E11.9 DIABETES (HCC): Status: ACTIVE | Noted: 2017-12-29

## 2017-12-29 LAB
ALBUMIN SERPL-MCNC: 2 G/DL (ref 3.5–5.2)
ALBUMIN/GLOB SERPL: 0.5 G/DL
ALP SERPL-CCNC: 75 U/L (ref 39–117)
ALT SERPL W P-5'-P-CCNC: 41 U/L (ref 1–41)
ANION GAP SERPL CALCULATED.3IONS-SCNC: 11.5 MMOL/L
ASA PLATELET INHIBITION: 574 ARU
AST SERPL-CCNC: 29 U/L (ref 1–40)
BILIRUB SERPL-MCNC: 0.8 MG/DL (ref 0.1–1.2)
BUN BLD-MCNC: 16 MG/DL (ref 6–20)
BUN/CREAT SERPL: 29.6 (ref 7–25)
CALCIUM SPEC-SCNC: 8.4 MG/DL (ref 8.6–10.5)
CHLORIDE SERPL-SCNC: 103 MMOL/L (ref 98–107)
CO2 SERPL-SCNC: 24.5 MMOL/L (ref 22–29)
CREAT BLD-MCNC: 0.46 MG/DL (ref 0.76–1.27)
CREAT BLD-MCNC: 0.54 MG/DL (ref 0.76–1.27)
CRP SERPL-MCNC: 5.55 MG/DL (ref 0–0.5)
DEPRECATED RDW RBC AUTO: 51.9 FL (ref 37–54)
ERYTHROCYTE [DISTWIDTH] IN BLOOD BY AUTOMATED COUNT: 14.5 % (ref 11.5–14.5)
GFR SERPL CREATININE-BSD FRML MDRD: >150 ML/MIN/1.73
GFR SERPL CREATININE-BSD FRML MDRD: >150 ML/MIN/1.73
GLOBULIN UR ELPH-MCNC: 4.2 GM/DL
GLUCOSE BLD-MCNC: 145 MG/DL (ref 65–99)
GLUCOSE BLDC GLUCOMTR-MCNC: 123 MG/DL (ref 70–130)
GLUCOSE BLDC GLUCOMTR-MCNC: 129 MG/DL (ref 70–130)
GLUCOSE BLDC GLUCOMTR-MCNC: 135 MG/DL (ref 70–130)
GLUCOSE BLDC GLUCOMTR-MCNC: 98 MG/DL (ref 70–130)
HCT VFR BLD AUTO: 39.3 % (ref 40.4–52.2)
HGB BLD-MCNC: 12.2 G/DL (ref 13.7–17.6)
MCH RBC QN AUTO: 30.3 PG (ref 27–32.7)
MCHC RBC AUTO-ENTMCNC: 31 G/DL (ref 32.6–36.4)
MCV RBC AUTO: 97.5 FL (ref 79.8–96.2)
PLATELET # BLD AUTO: 225 10*3/MM3 (ref 140–500)
PMV BLD AUTO: 11 FL (ref 6–12)
POTASSIUM BLD-SCNC: 4.2 MMOL/L (ref 3.5–5.2)
PROT SERPL-MCNC: 6.2 G/DL (ref 6–8.5)
RBC # BLD AUTO: 4.03 10*6/MM3 (ref 4.6–6)
SODIUM BLD-SCNC: 139 MMOL/L (ref 136–145)
WBC NRBC COR # BLD: 10.04 10*3/MM3 (ref 4.5–10.7)

## 2017-12-29 PROCEDURE — 99232 SBSQ HOSP IP/OBS MODERATE 35: CPT | Performed by: NURSE PRACTITIONER

## 2017-12-29 PROCEDURE — 86140 C-REACTIVE PROTEIN: CPT | Performed by: INTERNAL MEDICINE

## 2017-12-29 PROCEDURE — 94799 UNLISTED PULMONARY SVC/PX: CPT

## 2017-12-29 PROCEDURE — 90791 PSYCH DIAGNOSTIC EVALUATION: CPT

## 2017-12-29 PROCEDURE — 93005 ELECTROCARDIOGRAM TRACING: CPT | Performed by: NURSE PRACTITIONER

## 2017-12-29 PROCEDURE — 99232 SBSQ HOSP IP/OBS MODERATE 35: CPT | Performed by: INTERNAL MEDICINE

## 2017-12-29 PROCEDURE — 82962 GLUCOSE BLOOD TEST: CPT

## 2017-12-29 PROCEDURE — 82565 ASSAY OF CREATININE: CPT | Performed by: INTERNAL MEDICINE

## 2017-12-29 PROCEDURE — 93922 UPR/L XTREMITY ART 2 LEVELS: CPT

## 2017-12-29 PROCEDURE — 93010 ELECTROCARDIOGRAM REPORT: CPT | Performed by: INTERNAL MEDICINE

## 2017-12-29 PROCEDURE — 25010000003 PENICILLIN G POTASSIUM PER 600000 UNITS: Performed by: INTERNAL MEDICINE

## 2017-12-29 PROCEDURE — A9577 INJ MULTIHANCE: HCPCS | Performed by: INTERNAL MEDICINE

## 2017-12-29 PROCEDURE — 72158 MRI LUMBAR SPINE W/O & W/DYE: CPT

## 2017-12-29 PROCEDURE — 93970 EXTREMITY STUDY: CPT

## 2017-12-29 PROCEDURE — 85027 COMPLETE CBC AUTOMATED: CPT | Performed by: INTERNAL MEDICINE

## 2017-12-29 PROCEDURE — 80053 COMPREHEN METABOLIC PANEL: CPT | Performed by: INTERNAL MEDICINE

## 2017-12-29 PROCEDURE — 0 GADOBENATE DIMEGLUMINE 529 MG/ML SOLUTION: Performed by: INTERNAL MEDICINE

## 2017-12-29 PROCEDURE — 97110 THERAPEUTIC EXERCISES: CPT

## 2017-12-29 PROCEDURE — 85576 BLOOD PLATELET AGGREGATION: CPT | Performed by: NURSE PRACTITIONER

## 2017-12-29 RX ORDER — METOPROLOL TARTRATE 50 MG/1
50 TABLET, FILM COATED ORAL EVERY 8 HOURS
Status: DISCONTINUED | OUTPATIENT
Start: 2017-12-29 | End: 2018-01-11 | Stop reason: HOSPADM

## 2017-12-29 RX ADMIN — SODIUM CHLORIDE 4 MILLION UNITS: 0.9 INJECTION INTRAVENOUS at 22:37

## 2017-12-29 RX ADMIN — MORPHINE SULFATE 15 MG: 15 TABLET, EXTENDED RELEASE ORAL at 21:28

## 2017-12-29 RX ADMIN — INSULIN DETEMIR 20 UNITS: 100 INJECTION, SOLUTION SUBCUTANEOUS at 09:05

## 2017-12-29 RX ADMIN — ASPIRIN 81 MG: 81 TABLET ORAL at 09:05

## 2017-12-29 RX ADMIN — OXYCODONE HYDROCHLORIDE 15 MG: 15 TABLET ORAL at 04:07

## 2017-12-29 RX ADMIN — METOPROLOL TARTRATE 50 MG: 50 TABLET, FILM COATED ORAL at 17:58

## 2017-12-29 RX ADMIN — MORPHINE SULFATE 15 MG: 15 TABLET, EXTENDED RELEASE ORAL at 09:05

## 2017-12-29 RX ADMIN — OXYCODONE HYDROCHLORIDE 15 MG: 15 TABLET ORAL at 17:58

## 2017-12-29 RX ADMIN — OXYCODONE HYDROCHLORIDE 15 MG: 15 TABLET ORAL at 11:13

## 2017-12-29 RX ADMIN — SODIUM CHLORIDE 4 MILLION UNITS: 0.9 INJECTION INTRAVENOUS at 19:09

## 2017-12-29 RX ADMIN — SODIUM CHLORIDE 4 MILLION UNITS: 0.9 INJECTION INTRAVENOUS at 00:30

## 2017-12-29 RX ADMIN — IPRATROPIUM BROMIDE AND ALBUTEROL SULFATE 3 ML: .5; 3 SOLUTION RESPIRATORY (INHALATION) at 07:43

## 2017-12-29 RX ADMIN — SODIUM CHLORIDE 4 MILLION UNITS: 0.9 INJECTION INTRAVENOUS at 14:47

## 2017-12-29 RX ADMIN — SODIUM CHLORIDE 4 MILLION UNITS: 0.9 INJECTION INTRAVENOUS at 08:37

## 2017-12-29 RX ADMIN — SODIUM CHLORIDE 4 MILLION UNITS: 0.9 INJECTION INTRAVENOUS at 04:04

## 2017-12-29 RX ADMIN — ATORVASTATIN CALCIUM 40 MG: 20 TABLET, FILM COATED ORAL at 09:05

## 2017-12-29 RX ADMIN — IPRATROPIUM BROMIDE AND ALBUTEROL SULFATE 3 ML: .5; 3 SOLUTION RESPIRATORY (INHALATION) at 23:38

## 2017-12-29 RX ADMIN — ACETAMINOPHEN 650 MG: 325 TABLET ORAL at 14:56

## 2017-12-29 RX ADMIN — GADOBENATE DIMEGLUMINE 20 ML: 529 INJECTION, SOLUTION INTRAVENOUS at 13:05

## 2017-12-29 RX ADMIN — METOPROLOL TARTRATE 50 MG: 50 TABLET, FILM COATED ORAL at 09:05

## 2017-12-29 RX ADMIN — IPRATROPIUM BROMIDE AND ALBUTEROL SULFATE 3 ML: .5; 3 SOLUTION RESPIRATORY (INHALATION) at 15:18

## 2017-12-30 ENCOUNTER — APPOINTMENT (OUTPATIENT)
Dept: MRI IMAGING | Facility: HOSPITAL | Age: 57
End: 2017-12-30

## 2017-12-30 LAB
ANION GAP SERPL CALCULATED.3IONS-SCNC: 9.9 MMOL/L
BASOPHILS # BLD AUTO: 0.02 10*3/MM3 (ref 0–0.2)
BASOPHILS NFR BLD AUTO: 0.2 % (ref 0–1.5)
BH CV LOWER ARTERIAL LEFT ABI RATIO: 1.21
BH CV LOWER ARTERIAL LEFT DORSALIS PEDIS SYS MAX: 122 MMHG
BH CV LOWER ARTERIAL LEFT GREAT TOE SYS MAX: 108 MMHG
BH CV LOWER ARTERIAL LEFT POST TIBIAL SYS MAX: 131 MMHG
BH CV LOWER ARTERIAL LEFT TBI RATIO: 1
BH CV LOWER ARTERIAL RIGHT ABI RATIO: 1.22
BH CV LOWER ARTERIAL RIGHT DORSALIS PEDIS SYS MAX: 120 MMHG
BH CV LOWER ARTERIAL RIGHT GREAT TOE SYS MAX: 97 MMHG
BH CV LOWER ARTERIAL RIGHT POST TIBIAL SYS MAX: 132 MMHG
BH CV LOWER ARTERIAL RIGHT TBI RATIO: 0.9
BH CV LOWER VASCULAR LEFT COMMON FEMORAL AUGMENT: NORMAL
BH CV LOWER VASCULAR LEFT COMMON FEMORAL COMPETENT: NORMAL
BH CV LOWER VASCULAR LEFT COMMON FEMORAL COMPRESS: NORMAL
BH CV LOWER VASCULAR LEFT COMMON FEMORAL PHASIC: NORMAL
BH CV LOWER VASCULAR LEFT COMMON FEMORAL SPONT: NORMAL
BH CV LOWER VASCULAR LEFT DISTAL FEMORAL COMPRESS: NORMAL
BH CV LOWER VASCULAR LEFT GASTRONEMIUS COMPRESS: NORMAL
BH CV LOWER VASCULAR LEFT GREATER SAPH AK COMPRESS: NORMAL
BH CV LOWER VASCULAR LEFT GREATER SAPH BK COMPRESS: NORMAL
BH CV LOWER VASCULAR LEFT MID FEMORAL AUGMENT: NORMAL
BH CV LOWER VASCULAR LEFT MID FEMORAL COMPETENT: NORMAL
BH CV LOWER VASCULAR LEFT MID FEMORAL COMPRESS: NORMAL
BH CV LOWER VASCULAR LEFT MID FEMORAL PHASIC: NORMAL
BH CV LOWER VASCULAR LEFT MID FEMORAL SPONT: NORMAL
BH CV LOWER VASCULAR LEFT PERONEAL COMPRESS: NORMAL
BH CV LOWER VASCULAR LEFT POPLITEAL AUGMENT: NORMAL
BH CV LOWER VASCULAR LEFT POPLITEAL COMPETENT: NORMAL
BH CV LOWER VASCULAR LEFT POPLITEAL COMPRESS: NORMAL
BH CV LOWER VASCULAR LEFT POPLITEAL PHASIC: NORMAL
BH CV LOWER VASCULAR LEFT POPLITEAL SPONT: NORMAL
BH CV LOWER VASCULAR LEFT POSTERIOR TIBIAL COMPRESS: NORMAL
BH CV LOWER VASCULAR LEFT PROXIMAL FEMORAL COMPRESS: NORMAL
BH CV LOWER VASCULAR LEFT SAPHENOFEMORAL JUNCTION COMPRESS: NORMAL
BH CV LOWER VASCULAR LEFT SAPHENOFEMORAL JUNCTION PHASIC: NORMAL
BH CV LOWER VASCULAR LEFT SAPHENOFEMORAL JUNCTION SPONT: NORMAL
BH CV LOWER VASCULAR RIGHT COMMON FEMORAL AUGMENT: NORMAL
BH CV LOWER VASCULAR RIGHT COMMON FEMORAL COMPETENT: NORMAL
BH CV LOWER VASCULAR RIGHT COMMON FEMORAL COMPRESS: NORMAL
BH CV LOWER VASCULAR RIGHT COMMON FEMORAL PHASIC: NORMAL
BH CV LOWER VASCULAR RIGHT COMMON FEMORAL SPONT: NORMAL
BH CV LOWER VASCULAR RIGHT DISTAL FEMORAL COMPRESS: NORMAL
BH CV LOWER VASCULAR RIGHT GASTRONEMIUS COMPRESS: NORMAL
BH CV LOWER VASCULAR RIGHT GREATER SAPH AK COMPRESS: NORMAL
BH CV LOWER VASCULAR RIGHT GREATER SAPH BK COMPRESS: NORMAL
BH CV LOWER VASCULAR RIGHT MID FEMORAL AUGMENT: NORMAL
BH CV LOWER VASCULAR RIGHT MID FEMORAL COMPETENT: NORMAL
BH CV LOWER VASCULAR RIGHT MID FEMORAL COMPRESS: NORMAL
BH CV LOWER VASCULAR RIGHT MID FEMORAL PHASIC: NORMAL
BH CV LOWER VASCULAR RIGHT MID FEMORAL SPONT: NORMAL
BH CV LOWER VASCULAR RIGHT PERONEAL COMPRESS: NORMAL
BH CV LOWER VASCULAR RIGHT POPLITEAL AUGMENT: NORMAL
BH CV LOWER VASCULAR RIGHT POPLITEAL COMPETENT: NORMAL
BH CV LOWER VASCULAR RIGHT POPLITEAL COMPRESS: NORMAL
BH CV LOWER VASCULAR RIGHT POPLITEAL PHASIC: NORMAL
BH CV LOWER VASCULAR RIGHT POPLITEAL SPONT: NORMAL
BH CV LOWER VASCULAR RIGHT POSTERIOR TIBIAL COMPRESS: NORMAL
BH CV LOWER VASCULAR RIGHT PROXIMAL FEMORAL COMPRESS: NORMAL
BH CV LOWER VASCULAR RIGHT SAPHENOFEMORAL JUNCTION COMPRESS: NORMAL
BH CV LOWER VASCULAR RIGHT SAPHENOFEMORAL JUNCTION PHASIC: NORMAL
BH CV LOWER VASCULAR RIGHT SAPHENOFEMORAL JUNCTION SPONT: NORMAL
BUN BLD-MCNC: 15 MG/DL (ref 6–20)
BUN/CREAT SERPL: 33.3 (ref 7–25)
CALCIUM SPEC-SCNC: 8.3 MG/DL (ref 8.6–10.5)
CHLORIDE SERPL-SCNC: 103 MMOL/L (ref 98–107)
CO2 SERPL-SCNC: 26.1 MMOL/L (ref 22–29)
CREAT BLD-MCNC: 0.45 MG/DL (ref 0.76–1.27)
DEPRECATED RDW RBC AUTO: 51 FL (ref 37–54)
EOSINOPHIL # BLD AUTO: 0.08 10*3/MM3 (ref 0–0.7)
EOSINOPHIL NFR BLD AUTO: 0.8 % (ref 0.3–6.2)
ERYTHROCYTE [DISTWIDTH] IN BLOOD BY AUTOMATED COUNT: 14.6 % (ref 11.5–14.5)
GFR SERPL CREATININE-BSD FRML MDRD: >150 ML/MIN/1.73
GLUCOSE BLD-MCNC: 132 MG/DL (ref 65–99)
GLUCOSE BLDC GLUCOMTR-MCNC: 139 MG/DL (ref 70–130)
GLUCOSE BLDC GLUCOMTR-MCNC: 153 MG/DL (ref 70–130)
GLUCOSE BLDC GLUCOMTR-MCNC: 171 MG/DL (ref 70–130)
GLUCOSE BLDC GLUCOMTR-MCNC: 178 MG/DL (ref 70–130)
GLUCOSE BLDC GLUCOMTR-MCNC: 184 MG/DL (ref 70–130)
HCT VFR BLD AUTO: 38.3 % (ref 40.4–52.2)
HGB BLD-MCNC: 12 G/DL (ref 13.7–17.6)
IMM GRANULOCYTES # BLD: 0.1 10*3/MM3 (ref 0–0.03)
IMM GRANULOCYTES NFR BLD: 1 % (ref 0–0.5)
INR PPP: 1.25 (ref 0.9–1.1)
LYMPHOCYTES # BLD AUTO: 1.72 10*3/MM3 (ref 0.9–4.8)
LYMPHOCYTES NFR BLD AUTO: 16.6 % (ref 19.6–45.3)
MCH RBC QN AUTO: 30.2 PG (ref 27–32.7)
MCHC RBC AUTO-ENTMCNC: 31.3 G/DL (ref 32.6–36.4)
MCV RBC AUTO: 96.5 FL (ref 79.8–96.2)
MONOCYTES # BLD AUTO: 0.46 10*3/MM3 (ref 0.2–1.2)
MONOCYTES NFR BLD AUTO: 4.4 % (ref 5–12)
NEUTROPHILS # BLD AUTO: 7.96 10*3/MM3 (ref 1.9–8.1)
NEUTROPHILS NFR BLD AUTO: 77 % (ref 42.7–76)
NRBC BLD MANUAL-RTO: 0 /100 WBC (ref 0–0)
PLATELET # BLD AUTO: 264 10*3/MM3 (ref 140–500)
PMV BLD AUTO: 11.5 FL (ref 6–12)
POTASSIUM BLD-SCNC: 4 MMOL/L (ref 3.5–5.2)
PROCALCITONIN SERPL-MCNC: 0.29 NG/ML (ref 0.1–0.25)
PROTHROMBIN TIME: 15.3 SECONDS (ref 11.7–14.2)
RBC # BLD AUTO: 3.97 10*6/MM3 (ref 4.6–6)
SODIUM BLD-SCNC: 139 MMOL/L (ref 136–145)
UPPER ARTERIAL LEFT ARM BRACHIAL SYS MAX: 108 MMHG
UPPER ARTERIAL RIGHT ARM BRACHIAL SYS MAX: 101 MMHG
WBC NRBC COR # BLD: 10.34 10*3/MM3 (ref 4.5–10.7)

## 2017-12-30 PROCEDURE — 84145 PROCALCITONIN (PCT): CPT | Performed by: INTERNAL MEDICINE

## 2017-12-30 PROCEDURE — 94799 UNLISTED PULMONARY SVC/PX: CPT

## 2017-12-30 PROCEDURE — 72156 MRI NECK SPINE W/O & W/DYE: CPT

## 2017-12-30 PROCEDURE — 80048 BASIC METABOLIC PNL TOTAL CA: CPT | Performed by: INTERNAL MEDICINE

## 2017-12-30 PROCEDURE — 25010000003 PENICILLIN G POTASSIUM PER 600000 UNITS: Performed by: INTERNAL MEDICINE

## 2017-12-30 PROCEDURE — A9577 INJ MULTIHANCE: HCPCS | Performed by: INTERNAL MEDICINE

## 2017-12-30 PROCEDURE — 63710000001 INSULIN ASPART PER 5 UNITS: Performed by: INTERNAL MEDICINE

## 2017-12-30 PROCEDURE — 99232 SBSQ HOSP IP/OBS MODERATE 35: CPT | Performed by: INTERNAL MEDICINE

## 2017-12-30 PROCEDURE — 99231 SBSQ HOSP IP/OBS SF/LOW 25: CPT | Performed by: INTERNAL MEDICINE

## 2017-12-30 PROCEDURE — 0 GADOBENATE DIMEGLUMINE 529 MG/ML SOLUTION: Performed by: INTERNAL MEDICINE

## 2017-12-30 PROCEDURE — 85610 PROTHROMBIN TIME: CPT | Performed by: INTERNAL MEDICINE

## 2017-12-30 PROCEDURE — 85025 COMPLETE CBC W/AUTO DIFF WBC: CPT | Performed by: INTERNAL MEDICINE

## 2017-12-30 PROCEDURE — 82962 GLUCOSE BLOOD TEST: CPT

## 2017-12-30 RX ORDER — GLIPIZIDE 10 MG/1
10 TABLET ORAL
Status: DISCONTINUED | OUTPATIENT
Start: 2017-12-30 | End: 2018-01-11 | Stop reason: HOSPADM

## 2017-12-30 RX ADMIN — ATORVASTATIN CALCIUM 40 MG: 20 TABLET, FILM COATED ORAL at 08:26

## 2017-12-30 RX ADMIN — GADOBENATE DIMEGLUMINE 20 ML: 529 INJECTION, SOLUTION INTRAVENOUS at 14:21

## 2017-12-30 RX ADMIN — IPRATROPIUM BROMIDE AND ALBUTEROL SULFATE 3 ML: .5; 3 SOLUTION RESPIRATORY (INHALATION) at 16:07

## 2017-12-30 RX ADMIN — MORPHINE SULFATE 15 MG: 15 TABLET, EXTENDED RELEASE ORAL at 20:15

## 2017-12-30 RX ADMIN — OXYCODONE HYDROCHLORIDE 15 MG: 15 TABLET ORAL at 06:29

## 2017-12-30 RX ADMIN — OXYCODONE HYDROCHLORIDE 15 MG: 15 TABLET ORAL at 12:12

## 2017-12-30 RX ADMIN — SODIUM CHLORIDE 4 MILLION UNITS: 0.9 INJECTION INTRAVENOUS at 12:12

## 2017-12-30 RX ADMIN — IPRATROPIUM BROMIDE AND ALBUTEROL SULFATE 3 ML: .5; 3 SOLUTION RESPIRATORY (INHALATION) at 10:27

## 2017-12-30 RX ADMIN — SODIUM CHLORIDE 4 MILLION UNITS: 0.9 INJECTION INTRAVENOUS at 02:02

## 2017-12-30 RX ADMIN — METOPROLOL TARTRATE 50 MG: 50 TABLET, FILM COATED ORAL at 18:24

## 2017-12-30 RX ADMIN — OXYCODONE HYDROCHLORIDE 15 MG: 15 TABLET ORAL at 00:00

## 2017-12-30 RX ADMIN — OXYCODONE HYDROCHLORIDE 15 MG: 15 TABLET ORAL at 18:24

## 2017-12-30 RX ADMIN — SODIUM CHLORIDE 4 MILLION UNITS: 0.9 INJECTION INTRAVENOUS at 20:15

## 2017-12-30 RX ADMIN — METOPROLOL TARTRATE 50 MG: 50 TABLET, FILM COATED ORAL at 08:26

## 2017-12-30 RX ADMIN — IPRATROPIUM BROMIDE AND ALBUTEROL SULFATE 3 ML: .5; 3 SOLUTION RESPIRATORY (INHALATION) at 08:00

## 2017-12-30 RX ADMIN — SODIUM CHLORIDE 4 MILLION UNITS: 0.9 INJECTION INTRAVENOUS at 23:34

## 2017-12-30 RX ADMIN — IPRATROPIUM BROMIDE AND ALBUTEROL SULFATE 3 ML: .5; 3 SOLUTION RESPIRATORY (INHALATION) at 21:25

## 2017-12-30 RX ADMIN — GLIPIZIDE 10 MG: 10 TABLET ORAL at 18:27

## 2017-12-30 RX ADMIN — INSULIN ASPART 2 UNITS: 100 INJECTION, SOLUTION INTRAVENOUS; SUBCUTANEOUS at 18:28

## 2017-12-30 RX ADMIN — INSULIN ASPART 2 UNITS: 100 INJECTION, SOLUTION INTRAVENOUS; SUBCUTANEOUS at 21:56

## 2017-12-30 RX ADMIN — SODIUM CHLORIDE 4 MILLION UNITS: 0.9 INJECTION INTRAVENOUS at 06:30

## 2017-12-30 RX ADMIN — METOPROLOL TARTRATE 50 MG: 50 TABLET, FILM COATED ORAL at 02:00

## 2017-12-30 RX ADMIN — MORPHINE SULFATE 15 MG: 15 TABLET, EXTENDED RELEASE ORAL at 08:26

## 2017-12-30 RX ADMIN — ACETAMINOPHEN 650 MG: 325 TABLET ORAL at 15:29

## 2017-12-30 RX ADMIN — SODIUM CHLORIDE 4 MILLION UNITS: 0.9 INJECTION INTRAVENOUS at 15:30

## 2017-12-31 ENCOUNTER — APPOINTMENT (OUTPATIENT)
Dept: MRI IMAGING | Facility: HOSPITAL | Age: 57
End: 2017-12-31

## 2017-12-31 LAB
BASOPHILS # BLD AUTO: 0.02 10*3/MM3 (ref 0–0.2)
BASOPHILS NFR BLD AUTO: 0.2 % (ref 0–1.5)
DEPRECATED RDW RBC AUTO: 49.8 FL (ref 37–54)
EOSINOPHIL # BLD AUTO: 0.12 10*3/MM3 (ref 0–0.7)
EOSINOPHIL NFR BLD AUTO: 1 % (ref 0.3–6.2)
ERYTHROCYTE [DISTWIDTH] IN BLOOD BY AUTOMATED COUNT: 14.4 % (ref 11.5–14.5)
GLUCOSE BLDC GLUCOMTR-MCNC: 106 MG/DL (ref 70–130)
GLUCOSE BLDC GLUCOMTR-MCNC: 110 MG/DL (ref 70–130)
GLUCOSE BLDC GLUCOMTR-MCNC: 190 MG/DL (ref 70–130)
GLUCOSE BLDC GLUCOMTR-MCNC: 191 MG/DL (ref 70–130)
HCT VFR BLD AUTO: 36.7 % (ref 40.4–52.2)
HGB BLD-MCNC: 11.4 G/DL (ref 13.7–17.6)
IMM GRANULOCYTES # BLD: 0.09 10*3/MM3 (ref 0–0.03)
IMM GRANULOCYTES NFR BLD: 0.8 % (ref 0–0.5)
LYMPHOCYTES # BLD AUTO: 2.11 10*3/MM3 (ref 0.9–4.8)
LYMPHOCYTES NFR BLD AUTO: 18.4 % (ref 19.6–45.3)
MCH RBC QN AUTO: 29.8 PG (ref 27–32.7)
MCHC RBC AUTO-ENTMCNC: 31.1 G/DL (ref 32.6–36.4)
MCV RBC AUTO: 96.1 FL (ref 79.8–96.2)
MONOCYTES # BLD AUTO: 0.71 10*3/MM3 (ref 0.2–1.2)
MONOCYTES NFR BLD AUTO: 6.2 % (ref 5–12)
NEUTROPHILS # BLD AUTO: 8.44 10*3/MM3 (ref 1.9–8.1)
NEUTROPHILS NFR BLD AUTO: 73.4 % (ref 42.7–76)
NRBC BLD MANUAL-RTO: 0 /100 WBC (ref 0–0)
PLATELET # BLD AUTO: 310 10*3/MM3 (ref 140–500)
PMV BLD AUTO: 11.2 FL (ref 6–12)
RBC # BLD AUTO: 3.82 10*6/MM3 (ref 4.6–6)
WBC NRBC COR # BLD: 11.49 10*3/MM3 (ref 4.5–10.7)

## 2017-12-31 PROCEDURE — 0 GADOBENATE DIMEGLUMINE 529 MG/ML SOLUTION: Performed by: INTERNAL MEDICINE

## 2017-12-31 PROCEDURE — 97110 THERAPEUTIC EXERCISES: CPT | Performed by: PHYSICAL THERAPIST

## 2017-12-31 PROCEDURE — 99231 SBSQ HOSP IP/OBS SF/LOW 25: CPT | Performed by: INTERNAL MEDICINE

## 2017-12-31 PROCEDURE — 72157 MRI CHEST SPINE W/O & W/DYE: CPT

## 2017-12-31 PROCEDURE — 25010000002 PENICILLIN G POTASSIUM PER 600000 UNITS: Performed by: INTERNAL MEDICINE

## 2017-12-31 PROCEDURE — 94799 UNLISTED PULMONARY SVC/PX: CPT

## 2017-12-31 PROCEDURE — A9577 INJ MULTIHANCE: HCPCS | Performed by: INTERNAL MEDICINE

## 2017-12-31 PROCEDURE — 85025 COMPLETE CBC W/AUTO DIFF WBC: CPT | Performed by: INTERNAL MEDICINE

## 2017-12-31 PROCEDURE — 63710000001 INSULIN ASPART PER 5 UNITS: Performed by: INTERNAL MEDICINE

## 2017-12-31 PROCEDURE — 25010000003 PENICILLIN G POTASSIUM PER 600000 UNITS: Performed by: INTERNAL MEDICINE

## 2017-12-31 PROCEDURE — 99231 SBSQ HOSP IP/OBS SF/LOW 25: CPT | Performed by: NEUROLOGICAL SURGERY

## 2017-12-31 PROCEDURE — 82962 GLUCOSE BLOOD TEST: CPT

## 2017-12-31 RX ORDER — MORPHINE SULFATE 30 MG/1
30 TABLET, FILM COATED, EXTENDED RELEASE ORAL EVERY 12 HOURS SCHEDULED
Status: COMPLETED | OUTPATIENT
Start: 2017-12-31 | End: 2018-01-04

## 2017-12-31 RX ADMIN — SODIUM CHLORIDE 4 MILLION UNITS: 0.9 INJECTION INTRAVENOUS at 17:02

## 2017-12-31 RX ADMIN — OXYCODONE HYDROCHLORIDE 15 MG: 15 TABLET ORAL at 19:12

## 2017-12-31 RX ADMIN — IPRATROPIUM BROMIDE AND ALBUTEROL SULFATE 3 ML: .5; 3 SOLUTION RESPIRATORY (INHALATION) at 10:40

## 2017-12-31 RX ADMIN — METOPROLOL TARTRATE 50 MG: 50 TABLET, FILM COATED ORAL at 17:02

## 2017-12-31 RX ADMIN — METFORMIN HYDROCHLORIDE 500 MG: 500 TABLET ORAL at 17:57

## 2017-12-31 RX ADMIN — MORPHINE SULFATE 30 MG: 30 TABLET, EXTENDED RELEASE ORAL at 21:09

## 2017-12-31 RX ADMIN — METOPROLOL TARTRATE 50 MG: 50 TABLET, FILM COATED ORAL at 02:13

## 2017-12-31 RX ADMIN — SODIUM CHLORIDE 4 MILLION UNITS: 0.9 INJECTION INTRAVENOUS at 02:13

## 2017-12-31 RX ADMIN — GLIPIZIDE 10 MG: 10 TABLET ORAL at 08:06

## 2017-12-31 RX ADMIN — IPRATROPIUM BROMIDE AND ALBUTEROL SULFATE 3 ML: .5; 3 SOLUTION RESPIRATORY (INHALATION) at 15:00

## 2017-12-31 RX ADMIN — OXYCODONE HYDROCHLORIDE 15 MG: 15 TABLET ORAL at 00:20

## 2017-12-31 RX ADMIN — IPRATROPIUM BROMIDE AND ALBUTEROL SULFATE 3 ML: .5; 3 SOLUTION RESPIRATORY (INHALATION) at 19:35

## 2017-12-31 RX ADMIN — ATORVASTATIN CALCIUM 40 MG: 20 TABLET, FILM COATED ORAL at 08:14

## 2017-12-31 RX ADMIN — SODIUM CHLORIDE 4 MILLION UNITS: 0.9 INJECTION INTRAVENOUS at 23:16

## 2017-12-31 RX ADMIN — SODIUM CHLORIDE 4 MILLION UNITS: 0.9 INJECTION INTRAVENOUS at 06:17

## 2017-12-31 RX ADMIN — OXYCODONE HYDROCHLORIDE 15 MG: 15 TABLET ORAL at 06:17

## 2017-12-31 RX ADMIN — INSULIN ASPART 2 UNITS: 100 INJECTION, SOLUTION INTRAVENOUS; SUBCUTANEOUS at 11:13

## 2017-12-31 RX ADMIN — MORPHINE SULFATE 15 MG: 15 TABLET, EXTENDED RELEASE ORAL at 08:14

## 2017-12-31 RX ADMIN — GADOBENATE DIMEGLUMINE 20 ML: 529 INJECTION, SOLUTION INTRAVENOUS at 14:01

## 2017-12-31 RX ADMIN — METOPROLOL TARTRATE 50 MG: 50 TABLET, FILM COATED ORAL at 10:33

## 2017-12-31 RX ADMIN — IPRATROPIUM BROMIDE AND ALBUTEROL SULFATE 3 ML: .5; 3 SOLUTION RESPIRATORY (INHALATION) at 08:02

## 2017-12-31 RX ADMIN — SODIUM CHLORIDE 4 MILLION UNITS: 0.9 INJECTION INTRAVENOUS at 11:13

## 2017-12-31 RX ADMIN — SODIUM CHLORIDE 4 MILLION UNITS: 0.9 INJECTION INTRAVENOUS at 18:29

## 2017-12-31 RX ADMIN — OXYCODONE HYDROCHLORIDE 15 MG: 15 TABLET ORAL at 13:18

## 2017-12-31 RX ADMIN — METFORMIN HYDROCHLORIDE 500 MG: 500 TABLET ORAL at 08:00

## 2017-12-31 RX ADMIN — ACETAMINOPHEN 650 MG: 325 TABLET ORAL at 05:16

## 2018-01-01 LAB
BASOPHILS # BLD AUTO: 0.02 10*3/MM3 (ref 0–0.2)
BASOPHILS NFR BLD AUTO: 0.2 % (ref 0–1.5)
DEPRECATED RDW RBC AUTO: 50.4 FL (ref 37–54)
EOSINOPHIL # BLD AUTO: 0.07 10*3/MM3 (ref 0–0.7)
EOSINOPHIL NFR BLD AUTO: 0.7 % (ref 0.3–6.2)
ERYTHROCYTE [DISTWIDTH] IN BLOOD BY AUTOMATED COUNT: 14.5 % (ref 11.5–14.5)
GLUCOSE BLDC GLUCOMTR-MCNC: 133 MG/DL (ref 70–130)
GLUCOSE BLDC GLUCOMTR-MCNC: 140 MG/DL (ref 70–130)
GLUCOSE BLDC GLUCOMTR-MCNC: 143 MG/DL (ref 70–130)
GLUCOSE BLDC GLUCOMTR-MCNC: 150 MG/DL (ref 70–130)
HCT VFR BLD AUTO: 35.9 % (ref 40.4–52.2)
HGB BLD-MCNC: 11.4 G/DL (ref 13.7–17.6)
IMM GRANULOCYTES # BLD: 0.07 10*3/MM3 (ref 0–0.03)
IMM GRANULOCYTES NFR BLD: 0.7 % (ref 0–0.5)
LYMPHOCYTES # BLD AUTO: 1.93 10*3/MM3 (ref 0.9–4.8)
LYMPHOCYTES NFR BLD AUTO: 20.1 % (ref 19.6–45.3)
MCH RBC QN AUTO: 30.4 PG (ref 27–32.7)
MCHC RBC AUTO-ENTMCNC: 31.8 G/DL (ref 32.6–36.4)
MCV RBC AUTO: 95.7 FL (ref 79.8–96.2)
MONOCYTES # BLD AUTO: 0.8 10*3/MM3 (ref 0.2–1.2)
MONOCYTES NFR BLD AUTO: 8.3 % (ref 5–12)
NEUTROPHILS # BLD AUTO: 6.71 10*3/MM3 (ref 1.9–8.1)
NEUTROPHILS NFR BLD AUTO: 70 % (ref 42.7–76)
PLATELET # BLD AUTO: 331 10*3/MM3 (ref 140–500)
PMV BLD AUTO: 10.7 FL (ref 6–12)
RBC # BLD AUTO: 3.75 10*6/MM3 (ref 4.6–6)
WBC NRBC COR # BLD: 9.6 10*3/MM3 (ref 4.5–10.7)

## 2018-01-01 PROCEDURE — 25010000003 PENICILLIN G POTASSIUM PER 600000 UNITS: Performed by: INTERNAL MEDICINE

## 2018-01-01 PROCEDURE — 92526 ORAL FUNCTION THERAPY: CPT

## 2018-01-01 PROCEDURE — 99231 SBSQ HOSP IP/OBS SF/LOW 25: CPT | Performed by: INTERNAL MEDICINE

## 2018-01-01 PROCEDURE — 82962 GLUCOSE BLOOD TEST: CPT

## 2018-01-01 PROCEDURE — 94799 UNLISTED PULMONARY SVC/PX: CPT

## 2018-01-01 PROCEDURE — 99233 SBSQ HOSP IP/OBS HIGH 50: CPT | Performed by: INTERNAL MEDICINE

## 2018-01-01 PROCEDURE — 99232 SBSQ HOSP IP/OBS MODERATE 35: CPT | Performed by: NEUROLOGICAL SURGERY

## 2018-01-01 PROCEDURE — 85025 COMPLETE CBC W/AUTO DIFF WBC: CPT | Performed by: INTERNAL MEDICINE

## 2018-01-01 RX ADMIN — IPRATROPIUM BROMIDE AND ALBUTEROL SULFATE 3 ML: .5; 3 SOLUTION RESPIRATORY (INHALATION) at 19:58

## 2018-01-01 RX ADMIN — MORPHINE SULFATE 30 MG: 30 TABLET, EXTENDED RELEASE ORAL at 20:59

## 2018-01-01 RX ADMIN — GLIPIZIDE 10 MG: 10 TABLET ORAL at 09:50

## 2018-01-01 RX ADMIN — OXYCODONE HYDROCHLORIDE 15 MG: 15 TABLET ORAL at 19:38

## 2018-01-01 RX ADMIN — METOPROLOL TARTRATE 50 MG: 50 TABLET, FILM COATED ORAL at 17:13

## 2018-01-01 RX ADMIN — ATORVASTATIN CALCIUM 40 MG: 20 TABLET, FILM COATED ORAL at 09:55

## 2018-01-01 RX ADMIN — OXYCODONE HYDROCHLORIDE 15 MG: 15 TABLET ORAL at 02:00

## 2018-01-01 RX ADMIN — IPRATROPIUM BROMIDE AND ALBUTEROL SULFATE 3 ML: .5; 3 SOLUTION RESPIRATORY (INHALATION) at 11:16

## 2018-01-01 RX ADMIN — SODIUM CHLORIDE 4 MILLION UNITS: 0.9 INJECTION INTRAVENOUS at 06:47

## 2018-01-01 RX ADMIN — SODIUM CHLORIDE 4 MILLION UNITS: 0.9 INJECTION INTRAVENOUS at 02:00

## 2018-01-01 RX ADMIN — MORPHINE SULFATE 30 MG: 30 TABLET, EXTENDED RELEASE ORAL at 09:51

## 2018-01-01 RX ADMIN — METOPROLOL TARTRATE 50 MG: 50 TABLET, FILM COATED ORAL at 09:51

## 2018-01-01 RX ADMIN — SODIUM CHLORIDE 4 MILLION UNITS: 0.9 INJECTION INTRAVENOUS at 12:05

## 2018-01-01 RX ADMIN — SODIUM CHLORIDE 4 MILLION UNITS: 0.9 INJECTION INTRAVENOUS at 18:59

## 2018-01-01 RX ADMIN — METFORMIN HYDROCHLORIDE 500 MG: 500 TABLET ORAL at 17:13

## 2018-01-01 RX ADMIN — OXYCODONE HYDROCHLORIDE 15 MG: 15 TABLET ORAL at 13:17

## 2018-01-01 RX ADMIN — METOPROLOL TARTRATE 50 MG: 50 TABLET, FILM COATED ORAL at 02:00

## 2018-01-01 RX ADMIN — IPRATROPIUM BROMIDE AND ALBUTEROL SULFATE 3 ML: .5; 3 SOLUTION RESPIRATORY (INHALATION) at 08:06

## 2018-01-01 RX ADMIN — SODIUM CHLORIDE 4 MILLION UNITS: 0.9 INJECTION INTRAVENOUS at 15:59

## 2018-01-01 RX ADMIN — SODIUM CHLORIDE 4 MILLION UNITS: 0.9 INJECTION INTRAVENOUS at 22:36

## 2018-01-01 RX ADMIN — IPRATROPIUM BROMIDE AND ALBUTEROL SULFATE 3 ML: .5; 3 SOLUTION RESPIRATORY (INHALATION) at 15:09

## 2018-01-01 NOTE — PLAN OF CARE
Problem: Patient Care Overview (Adult)  Goal: Plan of Care Review  Outcome: Ongoing (interventions implemented as appropriate)   01/01/18 1155   Coping/Psychosocial Response Interventions   Plan Of Care Reviewed With patient   Outcome Evaluation   Outcome Summary/Follow up Plan Pt seen with puree and honey thick, no overt s/s aspiration. Recommend repeat instrumental prior to upgrade. MD please see note regarding FEES versus VFSS, ? if pt is ok to have barium.        Problem: Inpatient SLP  Goal: Dysphagia- Patient will safely consume diet as per recommendation with no signs/symptoms of aspiration  Outcome: Ongoing (interventions implemented as appropriate)   01/01/18 1155   Safely Consume Diet   Safely Consume Diet- SLP, Date Established 01/01/18   Safely Consume Diet- SLP, Time to Achieve by discharge   Safely Consume Diet- SLP, Outcome goal ongoing

## 2018-01-01 NOTE — PROGRESS NOTES
"  ENDOCRINE    Subjective   AND PLANS  Sam Barrett is a 57 y.o. male.     Follow-up diabetes    No nausea or vomiting.  Fasting glucose 133.  Random glucose 106-143.  Continue glipizide and metformin.    Objective   /79 (BP Location: Left arm, Patient Position: Lying)  Pulse 104  Temp 98.2 °F (36.8 °C) (Oral)   Resp 22  Ht 195.6 cm (77.01\")  Wt 120 kg (265 lb 4.8 oz)  SpO2 93%  BMI 31.45 kg/m2  Physical Exam    Not in distress.  No rales or wheezes.  Regular heart rate and rhythm.  Abdomen soft  No cyanosis or pedal edema.    Lab Results (last 24 hours)     Procedure Component Value Units Date/Time    POC Glucose Once [[id number]]  (Normal) Collected:  12/31/17 1656    Specimen:  Blood Updated:  12/31/17 1657     Glucose 110 mg/dL     Narrative:       Meter: [id number] : [id number] Dean ASHLEY    POC Glucose Once [[id number]]  (Normal) Collected:  12/31/17 2051    Specimen:  Blood Updated:  12/31/17 2052     Glucose 106 mg/dL     Narrative:       Meter: [id number] : [id number] Ray Lucia L    CBC & Differential [[id number]] Collected:  01/01/18 0821    Specimen:  Blood Updated:  01/01/18 0848    Narrative:       The following orders were created for panel order CBC & Differential.  Procedure                               Abnormality         Status                     ---------                               -----------         ------                     CBC Auto Differential[[id number]]        Abnormal            Final result                 Please view results for these tests on the individual orders.    CBC Auto Differential [[id number]]  (Abnormal) Collected:  01/01/18 0821    Specimen:  Blood Updated:  01/01/18 0848     WBC 9.60 10*3/mm3      RBC 3.75 (L) 10*6/mm3      Hemoglobin 11.4 (L) g/dL      Hematocrit 35.9 (L) %      MCV 95.7 fL      MCH 30.4 pg      MCHC 31.8 (L) g/dL      RDW 14.5 %      RDW-SD 50.4 fl      MPV 10.7 fL      Platelets 331 10*3/mm3      Neutrophil % 70.0 %      Lymphocyte " % 20.1 %      Monocyte % 8.3 %      Eosinophil % 0.7 %      Basophil % 0.2 %      Immature Grans % 0.7 (H) %      Neutrophils, Absolute 6.71 10*3/mm3      Lymphocytes, Absolute 1.93 10*3/mm3      Monocytes, Absolute 0.80 10*3/mm3      Eosinophils, Absolute 0.07 10*3/mm3      Basophils, Absolute 0.02 10*3/mm3      Immature Grans, Absolute 0.07 (H) 10*3/mm3     POC Glucose Once [[id number]]  (Abnormal) Collected:  01/01/18 0850    Specimen:  Blood Updated:  01/01/18 0901     Glucose 133 (H) mg/dL     Narrative:       Meter: [id number] : [id number] Mauricio HDZ    POC Glucose Once [[id number]]  (Abnormal) Collected:  01/01/18 1117    Specimen:  Blood Updated:  01/01/18 1129     Glucose 143 (H) mg/dL     Narrative:       Meter: [id number] : [id number] HDZ            Active Problems:    Sepsis    Abnormal MRI, lumbar spine    Diabetes    Continue metformin, glipizide, and NovoLog as needed

## 2018-01-01 NOTE — PROGRESS NOTES
INFECTIOUS DISEASES PROGRESS NOTE    CC: f/u absecess    S:   Patient complains of 2 weeks of swelling and overall soreness.  He is not having any fevers or chills or night sweats or nausea or vomiting or diarrhea.  No numbness or weakness in the hands or legs or bowel or bladder dysfunction.  He is tolerating his antibiotics without rash.    O:  Physical Exam:  Temp:  [98.2 °F (36.8 °C)-99.2 °F (37.3 °C)] 99.2 °F (37.3 °C)  Heart Rate:  [] 85  Resp:  [18-25] 25  BP: (116-138)/(74-81) 125/74  Physical Exam  GENERAL: Awake and alert, in no acute distress.   HEENT: Oropharynx is dry with healing scab lesions on left side of mouth. Hearing is grossly normal.   EYES:  No conjunctival injection. No lid lag.   HEART: Regular rhythm, tachycardic. 1+ RLE and RUE peripheral edema.   LUNGS: Clear to auscultation anteriorly with normal respiratory effort.   GI: Soft, nontender, nondistended. No appreciable organomegaly.   SKIN: Warm and dry without cutaneous eruptions   PSYCHIATRIC: Appropriate mood, affect, insight, and judgment.       Diagnostics:    WBC 9.60 (p70, L 20, M 8)  H/H 11.4/36      glc 106-191    MRI T spine: T10-12 Epidural abscess      Microbiology:  [facility]: Group B Strep in 2/2 sets (sensitive to PCN and ceftriaxone)  BCx: 12/23 negative    Assessment/Plan   1. Group B Strep septicemia secondary to IV drug use  -continue PCN G 4 million units q4h   -Repeat blood cultures negative   -ARUNA negative     2.  Epidural abscesses, T10-12 2/2 #1  - Appreciate neurosurgery input; NPO tonight for anticipated washout tomorrow  - Repeat MRI shows persistent abscess  - Continue antibiotic as per #1 above      3. History of IV drug abuse  -HIV negative; UDS with opiates      4. Right upper and lower extremity erythema and edema     5. Dm2 in obese - complicating above   -cont glycemic control efforts to control infectious complications    6. Chronic Hep C   -HCV RNA 6.2 million and genotype  2b  - Can address therapy as outpatient    D/w Dr. DIANE Frank regarding above.    Avel Kelly MD  9:19 AM  01/01/18

## 2018-01-01 NOTE — PROGRESS NOTES
[facility] PROGRESS NOTE    PATIENT IDENTIFICATION:   Name:  Sam Barrett      MRN:  [id number]     57 y.o.  male                   Active Problems:    Sepsis    Abnormal MRI, lumbar spine    Diabetes        Subjective   CC: back pain and leg pain  Interval History: Remains intermittently hyperglycemic - improving.  Afebrile.  No perineal complaints.  Strength is stable per patient.    Objective     Vital signs in last 24 hours:  Temp:  [98.2 °F (36.8 °C)-98.5 °F (36.9 °C)] 98.2 °F (36.8 °C)  Heart Rate:  [] 85  Resp:  [18-25] 25  BP: (116-138)/(76-81) 122/81      Intake/Output last 3 shifts:  I/O last 3 completed shifts:  In: 990 [P.O.:590; IV Piggyback:400]  Out: 4350 [Urine:4350]  Intake/Output this shift:       LABS    Results from last 7 days  Lab Units 12/30/17  0536   INR  1.25*     Lab Results   Component Value Date    CALCIUM 8.3 (L) 12/30/2017     Results from last 7 days  Lab Units 12/31/17  0604 12/30/17  0536 12/29/17  1959 12/29/17  0612 12/28/17  0758 12/27/17  0515  12/27/17  0012   MAGNESIUM mg/dL  --   --   --   --   --   --   --  2.4   SODIUM mmol/L  --  139 139  --  143 150*  < >  --    POTASSIUM mmol/L  --  4.0 4.2  --  3.7 3.3*  --  3.5   CHLORIDE mmol/L  --  103 103  --  110* 115*  < >  --    CO2 mmol/L  --  26.1 24.5  --  23.4 23.4  < >  --    BUN mg/dL  --  15 16  --  15 20  < >  --    CREATININE mg/dL  --  0.45* 0.54* 0.46* 0.50* 0.66*  < >  --    GLUCOSE mg/dL  --  132* 145*  --  129* 148*  < >  --    CALCIUM mg/dL  --  8.3* 8.4*  --  8.3* 8.1*  < >  --    WBC 10*3/mm3 11.49* 10.34  --  10.04  --  8.36  < >  --    HEMOGLOBIN g/dL 11.4* 12.0*  --  12.2*  --  12.1*  < >  --    PLATELETS 10*3/mm3 310 264  --  225  --  243  < >  --    ALT (SGPT) U/L  --   --  41  --  40 43*  --   --    AST (SGOT) U/L  --   --  29  --  33 31  --   --    < > = values in this interval not displayed.  Physical Exam:  Neck supple  5/5 in LE, pain limited IP as before  Intact lt/pp/prop  in LE  No clonus    4 - Opens eyes on own  6 - Follows simple motor commands  5 - Alert and oriented        ASSESSMENT  Assessment/Plan     Thoracic and lumbar osteomyelitis with YIMI.   Exam is stable  Last ASA was 29th likely would benefit from 5 day washout to mitigate risk of EDH if remains stable  Dr. Esparza to see tomorrow and resume care  D/w primary team and ID over this long weekend  Will make NPO at [place] again tonite     LOS: 9 days         Sam Donnelly IV, MD

## 2018-01-01 NOTE — PROGRESS NOTES
Ethan Frank MD                          [phone]      Patient ID:    Name:  Sam Barrett    MRN:  [id number]    1960   57 y.o.  male            Patient Care Team:  Casey Verduzco Jr., MD as PCP - General (Family Medicine)    LOS: 9  INTERVAL:  Follow up sepsis, group B strep septicemia, epidural abscess, infection of cephalic vein, IVDU, New onset DM, HCV  Chart reviewed, EMAR reviewed.   No acute events  States he feels stronger each day.  BLE with full movement, increased in RUE.  Still with pain in back but about the same.    Objective     Vital Signs past 24hrs    BP range: BP: (116-138)/(74-81) 125/74  Pulse range: Heart Rate:  [] 85  Resp rate range: Resp:  [18-25] 25  Temp range: Temp (24hrs), Av.5 °F (36.9 °C), Min:98.2 °F (36.8 °C), Max:99.2 °F (37.3 °C)    O2 Device: room air       120 kg (265 lb 4.8 oz); Body mass index is 31.45 kg/(m^2).    Intake/Output Summary (Last 24 hours) at 18 1301  Last data filed at 18 0920   Gross per 24 hour   Intake                0 ml   Output             3750 ml   Net            -3750 ml     Exam:  GENERAL:  NAD, Aox3  HEENT:  EOMI, nonicteric sclera, no JVD  PULMONARY:    No resp distress CTAB, no wheeze, no crackles, no rhonchi, symmetric air entry  CARDIAC:  RRR no MRG, S1 S2  ABD: SNTND BS+  EXT: mild edematous right le warm 1+ pitting edema, left lower extremity with distal pulses intact  and RUE with good rom, minimal edema strength 5/5 symmetric today, pulses symmetric 2+   NEURO:  CNs II-XII intact, no focal deficits  SKIN: as above  PSYCH: appropriate mood    Scheduled meds:      atorvastatin 40 mg Oral Daily   enoxaparin 40 mg Subcutaneous Q24H   glipiZIDE 10 mg Oral QAM AC   insulin aspart 2-5 Units Subcutaneous 4x Daily AC & at Bedtime   ipratropium-albuterol 3 mL Nebulization 4x Daily - RT   metFORMIN 500 mg Oral BID With Meals   metoprolol tartrate 50 mg Oral Q8H   Morphine 30 mg Oral Q12H    penicillin g (potassium) 4 Million Units Intravenous Q4H                      Data Review:      Results from last 7 days  Lab Units 01/01/18  0821 12/31/17  0604 12/30/17  0536 12/29/17  1959 12/29/17 0612 12/28/17  0758 12/27/17  0515   SODIUM mmol/L  --   --  139 139  --  143 150*   POTASSIUM mmol/L  --   --  4.0 4.2  --  3.7 3.3*   CHLORIDE mmol/L  --   --  103 103  --  110* 115*   CO2 mmol/L  --   --  26.1 24.5  --  23.4 23.4   BUN mg/dL  --   --  15 16  --  15 20   CREATININE mg/dL  --   --  0.45* 0.54* 0.46* 0.50* 0.66*   CALCIUM mg/dL  --   --  8.3* 8.4*  --  8.3* 8.1*   BILIRUBIN mg/dL  --   --   --  0.8  --  0.7 0.6   ALK PHOS U/L  --   --   --  75  --  73 75   ALT (SGPT) U/L  --   --   --  41  --  40 43*   AST (SGOT) U/L  --   --   --  29  --  33 31   GLUCOSE mg/dL  --   --  132* 145*  --  129* 148*   WBC 10*3/mm3 9.60 11.49* 10.34  --  10.04  --  8.36   HEMOGLOBIN g/dL 11.4* 11.4* 12.0*  --  12.2*  --  12.1*   PLATELETS 10*3/mm3 331 310 264  --  225  --  243   INR   --   --  1.25*  --   --   --   --    PROCALCITONIN ng/mL  --   --  0.29*  --   --   --   --        Lab Results   Component Value Date    CALCIUM 8.3 (L) 12/30/2017             Results Review:    I have reviewed the available laboratory results and reviewed the chest imaging personally    ASSESSMENT:   Acute hypoxic respiratory failure resolved   Strep septicemia/bacteremia   Right Cephalic vein Pylephlebitis  History of IV drug abuse   Cellulitis lower extremity   New onset diabetes mellitus  Chronic narcotic dependence/ abuse  Intermittent SVT   Hepatitis C  Epidural abscess    PLAN:  cotniuepain medications - does have legitimate source of pain.  Epidural abscess enlarged from admission scan - placed npo seems nsg to operate after ASA wash out.   Antibiotics managed by infectious diseases. - appreciate assistance  Insulin per endocrinology  Access center seen    Dispo pending NSG intervention and recovering    DVT ppx   Full Code      Ethan Frank MD  1/1/2018

## 2018-01-01 NOTE — SIGNIFICANT NOTE
01/01/18 1024   Rehab Treatment   Discipline physical therapist   Treatment Not Performed patient/family declined treatment  (Pt sleeping, but awakes easily. States he needs to rest because he was up all night secondary to pain. Will attempt to follow up later today if time allows. If not will follow up tomorrow.)   Recommendation   PT - Next Appointment 01/01/18

## 2018-01-01 NOTE — NURSING NOTE
"Discussed pt with DELIO Delgado. Per RN, medical team is discussing spinal surgery for mid week, concluding that discharge is not in immediate future.  Attempted to speak with patient however he was lethargic and just stated, \"I'm doing alright\" while nodding back off to sleep.   Access will sign off for now until pt has addressed medical issues and has a clearer view of discharge plan that way follow up can be addressed appropriately.   Please reconsult if needed.     "

## 2018-01-01 NOTE — THERAPY RE-EVALUATION
Acute Care - Speech Language Pathology   Swallow Re-Evaluation [facility]     Patient Name: Sam Barrett  : 1960  MRN: [id number]  Today's Date: 2018  Onset of Illness/Injury or Date of Surgery Date: 17            Admit Date: 2017  SPEECH-LANGUAGE PATHOLOGY EVALUATION - SWALLOW  Subjective: The patient was seen on this date for a Clinical Swallow evaluation.  Patient was alert and cooperative.    Objective: Textures given included honey thick liquid and puree consistency via tsp.  Assessment: Difficulties were noted with none of the above consistencies.  SLP Findings:  Patient presents with  oropharyngeal dysphagia .  Recommendations: Diet Textures: honey thick liquid via tsp, puree consistency food.  Medications should be taken crushed with puree.   Recommended Strategies: Upright for PO and small bites and sips. Oral care before breakfast, after all meals and PRN.  Other Recommended Evaluations: Repeat instrumental as appropriate. Per RN, unsure when surgery will be done. SLP with concern for VFSS secondary to barium. If x-rays/scans will be warranted after surgery ? If barium should be administered. ? If FEES more appropriate secondary to no barium.  MD please advise.   Dysphagia therapy is recommended. Rationale: Return to regular diet.    Visit Dx:   No diagnosis found.  Patient Active Problem List   Diagnosis   • Sepsis   • Abnormal MRI, lumbar spine   • Diabetes     History reviewed. No pertinent past medical history.  History reviewed. No pertinent surgical history.       SWALLOW EVALUATION (last 72 hours)      Swallow Evaluation       18 3035                Rehab Evaluation    Document Type therapy note (daily note)  -OC        Subjective Information agree to therapy  -OC        Patient Effort, Rehab Treatment good  -OC        Symptoms Noted During/After Treatment none  -OC        General Information    Patient Profile Review yes  -OC        Current Diet Limitations honey  thick liquids;puree;other (see comments)   via spoon  -OC        Prior Level of Function- Swallowing no diet consistency restrictions  -OC        Plans/Goals Discussed With patient  -OC        Barriers to Rehab medically complex  -OC        Clinical Impression    Patient's Goals For Discharge return to regular diet  -OC        SLP Swallowing Diagnosis oral dysfunction;pharyngeal dysfunction  -OC        Rehab Potential/Prognosis, Swallowing fair, will monitor progress closely  -OC        Criteria for Skilled Therapeutic Interventions Met skilled criteria for dysphagia intervention met  -OC        FCM, Swallowing 3-->Level 3  -OC        Therapy Frequency PRN  -OC        Predicted Duration Therapy Interv (days) until discharge  -OC        Expected Duration Therapy Session (min) 15-30 minutes  -OC        SLP Diet Recommendation II - pureed;honey-thick liquids;other (see comments)   via spoon with supervision  -OC        Recommended Diagnostics VFSS (MBS);FEES  -OC        Recommended Feeding/Eating Techniques alternate between small bites and sips of food/liquid;maintain upright posture during/after eating for 30 mins  -OC        SLP Rec. for Method of Medication Administration meds crushed in pudding/applesauce  -OC        Monitor For Signs Of Aspiration cough;elevated WBC count;gurgly voice;throat clearing;pneumonia;right lower lobe infiltrates  -OC        Anticipated Discharge Disposition other (see comments)   UNKNOWN  -OC        Pain Assessment    Pain Assessment No/denies pain  -OC        Cognitive Assessment/Intervention    Current Cognitive/Communication Assessment functional  -OC        Oral Motor Structure and Function    Oral Motor Anatomy and Physiology patient demonstrates anatomy and physiology that is WNL  -OC          User Key  (r) = Recorded By, (t) = Taken By, (c) = Cosigned By    Initials Name Effective Dates    OC Sandrita Luna MA,CCC-SLP 04/05/17 -         EDUCATION  The patient has been educated in  the following areas:   Dysphagia (Swallowing Impairment) Modified Diet Instruction.    SLP Recommendation and Plan  SLP Swallowing Diagnosis: oral dysfunction, pharyngeal dysfunction  SLP Diet Recommendation: II - pureed, honey-thick liquids, other (see comments) (via spoon with supervision)  Recommended Feeding/Eating Techniques: alternate between small bites and sips of food/liquid, maintain upright posture during/after eating for 30 mins  SLP Rec. for Method of Medication Administration: meds crushed in pudding/applesauce  Monitor For Signs Of Aspiration: cough, elevated WBC count, gurgly voice, throat clearing, pneumonia, right lower lobe infiltrates  Recommended Diagnostics: VFSS (MBS), FEES  Criteria for Skilled Therapeutic Interventions Met: skilled criteria for dysphagia intervention met  Anticipated Discharge Disposition: other (see comments) (UNKNOWN)  Rehab Potential/Prognosis, Swallowing: fair, will monitor progress closely  Therapy Frequency: PRN        Plan of Care Review  Plan Of Care Reviewed With: patient  Outcome Summary/Follow up Plan: Pt seen with puree and honey thick, no overt s/s aspiration. Recommend repeat instrumental prior to upgrade. MD please see note regarding FEES versus VFSS, ? if pt is ok to have barium.           IP SLP Goals       01/01/18 1155 12/28/17 0910 12/26/17 1347    Safely Consume Diet    Safely Consume Diet- SLP, Date Established 01/01/18  -OC  12/26/17  -SA    Safely Consume Diet- SLP, Time to Achieve by discharge  -OC  by discharge  -SA    Safely Consume Diet- SLP, Outcome goal ongoing  -OC goal not met  -       User Key  (r) = Recorded By, (t) = Taken By, (c) = Cosigned By    Initials Name Provider Type    SA Marisa Rosario, MS CCC-SLP Speech and Language Pathologist    OC Sandrita Luna MA,[facility]-SLP Speech and Language Pathologist     Sonia Mazariegos MS CCC-SLP Speech and Language Pathologist             SLP Outcome Measures (last 72 hours)      SLP Outcome Measures        01/01/18 1155          SLP Outcome Measures    Outcome Measure Used? Adult NOMS  -OC      FCM Scores    FCM Chosen Swallowing  -OC      Swallowing FCM Score 3  -OC        User Key  (r) = Recorded By, (t) = Taken By, (c) = Cosigned By    Initials Name Effective Dates    FILIPPO Luna MA,NATE-SLP 04/05/17 -            Time Calculation:         Time Calculation- SLP       01/01/18 1308          Time Calculation- SLP    SLP Start Time 1110  -OC      SLP Stop Time 1155  -OC      SLP Time Calculation (min) 45 min  -OC      SLP Received On 01/01/18  -OC        User Key  (r) = Recorded By, (t) = Taken By, (c) = Cosigned By    Initials Name Provider Type    OC Sandrita Luna MA,CCC-SLP Speech and Language Pathologist          Therapy Charges for Today     Code Description Service Date Service Provider Modifiers Qty    [id number]  ST TREATMENT SWALLOW 3 1/1/2018 Sandrita Luna MA, CCC-SLP GN 1               Sandrita Luna MA, CCC-SLP  1/1/2018

## 2018-01-01 NOTE — PLAN OF CARE
Problem: Patient Care Overview (Adult)  Goal: Plan of Care Review  Outcome: Ongoing (interventions implemented as appropriate)   01/01/18 0503   Coping/Psychosocial Response Interventions   Plan Of Care Reviewed With patient   Patient Care Overview   Progress no change   Outcome Evaluation   Outcome Summary/Follow up Plan pt has constant complaints of pain when awake, holding blood thinners per neurosx, NPO after midnight awaiting Dr. Donnelly. Blood sugar stable tonight, VSS will continue to monitor.        Problem: Fall Risk (Adult)  Goal: Absence of Falls  Outcome: Ongoing (interventions implemented as appropriate)      Problem: Sepsis (Adult)  Goal: Signs and Symptoms of Listed Potential Problems Will be Absent or Manageable (Sepsis)  Outcome: Outcome(s) achieved Date Met: 01/01/18      Problem: Pressure Ulcer Risk (Antonio Scale) (Adult,Obstetrics,Pediatric)  Goal: Skin Integrity  Outcome: Ongoing (interventions implemented as appropriate)      Problem: Respiratory Insufficiency (Adult)  Goal: Effective Ventilation  Outcome: Ongoing (interventions implemented as appropriate)    Goal: Effective Ventilation  Outcome: Ongoing (interventions implemented as appropriate)      Problem: Diabetes, Type 2 (Adult)  Goal: Signs and Symptoms of Listed Potential Problems Will be Absent or Manageable (Diabetes, Type 2)  Outcome: Outcome(s) achieved Date Met: 01/01/18

## 2018-01-02 PROBLEM — G06.1 ABSCESS IN EPIDURAL SPACE OF THORACIC SPINE: Status: ACTIVE | Noted: 2018-01-02

## 2018-01-02 PROBLEM — G06.1 ABSCESS IN EPIDURAL SPACE OF LUMBAR SPINE: Status: ACTIVE | Noted: 2018-01-02

## 2018-01-02 LAB
ABO GROUP BLD: NORMAL
BASOPHILS # BLD AUTO: 0.01 10*3/MM3 (ref 0–0.2)
BASOPHILS NFR BLD AUTO: 0.1 % (ref 0–1.5)
BLD GP AB SCN SERPL QL: NEGATIVE
DEPRECATED RDW RBC AUTO: 49.8 FL (ref 37–54)
EOSINOPHIL # BLD AUTO: 0.1 10*3/MM3 (ref 0–0.7)
EOSINOPHIL NFR BLD AUTO: 1.2 % (ref 0.3–6.2)
ERYTHROCYTE [DISTWIDTH] IN BLOOD BY AUTOMATED COUNT: 14.4 % (ref 11.5–14.5)
GLUCOSE BLDC GLUCOMTR-MCNC: 111 MG/DL (ref 70–130)
GLUCOSE BLDC GLUCOMTR-MCNC: 125 MG/DL (ref 70–130)
GLUCOSE BLDC GLUCOMTR-MCNC: 157 MG/DL (ref 70–130)
GLUCOSE BLDC GLUCOMTR-MCNC: 90 MG/DL (ref 70–130)
HCT VFR BLD AUTO: 35.5 % (ref 40.4–52.2)
HGB BLD-MCNC: 11.1 G/DL (ref 13.7–17.6)
IMM GRANULOCYTES # BLD: 0.05 10*3/MM3 (ref 0–0.03)
IMM GRANULOCYTES NFR BLD: 0.6 % (ref 0–0.5)
LYMPHOCYTES # BLD AUTO: 1.8 10*3/MM3 (ref 0.9–4.8)
LYMPHOCYTES NFR BLD AUTO: 21.3 % (ref 19.6–45.3)
MCH RBC QN AUTO: 30 PG (ref 27–32.7)
MCHC RBC AUTO-ENTMCNC: 31.3 G/DL (ref 32.6–36.4)
MCV RBC AUTO: 95.9 FL (ref 79.8–96.2)
MONOCYTES # BLD AUTO: 0.69 10*3/MM3 (ref 0.2–1.2)
MONOCYTES NFR BLD AUTO: 8.1 % (ref 5–12)
NEUTROPHILS # BLD AUTO: 5.82 10*3/MM3 (ref 1.9–8.1)
NEUTROPHILS NFR BLD AUTO: 68.7 % (ref 42.7–76)
PLATELET # BLD AUTO: 327 10*3/MM3 (ref 140–500)
PMV BLD AUTO: 10.5 FL (ref 6–12)
RBC # BLD AUTO: 3.7 10*6/MM3 (ref 4.6–6)
RH BLD: POSITIVE
WBC NRBC COR # BLD: 8.47 10*3/MM3 (ref 4.5–10.7)

## 2018-01-02 PROCEDURE — 25010000002 PENICILLIN G POTASSIUM PER 600000 UNITS: Performed by: INTERNAL MEDICINE

## 2018-01-02 PROCEDURE — 94799 UNLISTED PULMONARY SVC/PX: CPT

## 2018-01-02 PROCEDURE — 86850 RBC ANTIBODY SCREEN: CPT | Performed by: NEUROLOGICAL SURGERY

## 2018-01-02 PROCEDURE — 99231 SBSQ HOSP IP/OBS SF/LOW 25: CPT | Performed by: INTERNAL MEDICINE

## 2018-01-02 PROCEDURE — 99233 SBSQ HOSP IP/OBS HIGH 50: CPT | Performed by: INTERNAL MEDICINE

## 2018-01-02 PROCEDURE — 82962 GLUCOSE BLOOD TEST: CPT

## 2018-01-02 PROCEDURE — 85025 COMPLETE CBC W/AUTO DIFF WBC: CPT | Performed by: INTERNAL MEDICINE

## 2018-01-02 PROCEDURE — 25010000003 PENICILLIN G POTASSIUM PER 600000 UNITS: Performed by: INTERNAL MEDICINE

## 2018-01-02 PROCEDURE — 97110 THERAPEUTIC EXERCISES: CPT

## 2018-01-02 PROCEDURE — 86900 BLOOD TYPING SEROLOGIC ABO: CPT | Performed by: NEUROLOGICAL SURGERY

## 2018-01-02 PROCEDURE — 99232 SBSQ HOSP IP/OBS MODERATE 35: CPT | Performed by: NEUROLOGICAL SURGERY

## 2018-01-02 PROCEDURE — 86901 BLOOD TYPING SEROLOGIC RH(D): CPT | Performed by: NEUROLOGICAL SURGERY

## 2018-01-02 RX ADMIN — IPRATROPIUM BROMIDE AND ALBUTEROL SULFATE 3 ML: .5; 3 SOLUTION RESPIRATORY (INHALATION) at 20:35

## 2018-01-02 RX ADMIN — GLIPIZIDE 10 MG: 10 TABLET ORAL at 08:20

## 2018-01-02 RX ADMIN — METFORMIN HYDROCHLORIDE 500 MG: 500 TABLET ORAL at 08:21

## 2018-01-02 RX ADMIN — SODIUM CHLORIDE 4 MILLION UNITS: 0.9 INJECTION INTRAVENOUS at 06:18

## 2018-01-02 RX ADMIN — METFORMIN HYDROCHLORIDE 500 MG: 500 TABLET ORAL at 17:26

## 2018-01-02 RX ADMIN — IPRATROPIUM BROMIDE AND ALBUTEROL SULFATE 3 ML: .5; 3 SOLUTION RESPIRATORY (INHALATION) at 08:28

## 2018-01-02 RX ADMIN — METOPROLOL TARTRATE 50 MG: 50 TABLET, FILM COATED ORAL at 01:16

## 2018-01-02 RX ADMIN — SODIUM CHLORIDE 4 MILLION UNITS: 0.9 INJECTION INTRAVENOUS at 12:17

## 2018-01-02 RX ADMIN — OXYCODONE HYDROCHLORIDE 15 MG: 15 TABLET ORAL at 22:31

## 2018-01-02 RX ADMIN — OXYCODONE HYDROCHLORIDE 15 MG: 15 TABLET ORAL at 10:10

## 2018-01-02 RX ADMIN — METOPROLOL TARTRATE 50 MG: 50 TABLET, FILM COATED ORAL at 08:20

## 2018-01-02 RX ADMIN — OXYCODONE HYDROCHLORIDE 15 MG: 15 TABLET ORAL at 16:30

## 2018-01-02 RX ADMIN — SODIUM CHLORIDE 4 MILLION UNITS: 0.9 INJECTION INTRAVENOUS at 22:31

## 2018-01-02 RX ADMIN — SODIUM CHLORIDE 4 MILLION UNITS: 0.9 INJECTION INTRAVENOUS at 15:01

## 2018-01-02 RX ADMIN — MORPHINE SULFATE 30 MG: 30 TABLET, EXTENDED RELEASE ORAL at 08:21

## 2018-01-02 RX ADMIN — OXYCODONE HYDROCHLORIDE 15 MG: 15 TABLET ORAL at 01:16

## 2018-01-02 RX ADMIN — SODIUM CHLORIDE 4 MILLION UNITS: 0.9 INJECTION INTRAVENOUS at 02:50

## 2018-01-02 RX ADMIN — ATORVASTATIN CALCIUM 40 MG: 20 TABLET, FILM COATED ORAL at 08:20

## 2018-01-02 RX ADMIN — METOPROLOL TARTRATE 50 MG: 50 TABLET, FILM COATED ORAL at 17:27

## 2018-01-02 RX ADMIN — SODIUM CHLORIDE 4 MILLION UNITS: 0.9 INJECTION INTRAVENOUS at 18:52

## 2018-01-02 RX ADMIN — MORPHINE SULFATE 30 MG: 30 TABLET, EXTENDED RELEASE ORAL at 21:02

## 2018-01-02 RX ADMIN — IPRATROPIUM BROMIDE AND ALBUTEROL SULFATE 3 ML: .5; 3 SOLUTION RESPIRATORY (INHALATION) at 15:16

## 2018-01-02 NOTE — PROGRESS NOTES
Continued Stay Note  [facility]     Patient Name: Sam Barrett  MRN: [id number]  Today's Date: 1/2/2018    Admit Date: 12/23/2017          Discharge Plan       01/02/18 1212    Case Management/Social Work Plan    Plan [facility] - will need pre-cert    Patient/Family In Agreement With Plan yes    Additional Comments Spoke with patient at bedside.  Plan is for lumbar decompression 1/3.  He confirms that current plan is to go to [place] at [place].  Packet in CCP office, will need [employer] pre-cert.  CCP will continue to follow. BHumeniuk RN              Discharge Codes     None            Becky S. [place], RN

## 2018-01-02 NOTE — PROGRESS NOTES
" LOS: 10 days     Chief Complaint:  Follow-up GBS septicemia, T&L spine abscess    Interval History: NO fevers. Going to OR tomorrow for washout. Tolerating PCN G without rash or diarrhea. R arm pain and swelling are improved but not 100% resolved. He says it is sharp and worse w/ palpation. Alleviated with pain meds.    ROS: no n/v/d    Vital Signs  Temp:  [98.1 °F (36.7 °C)-98.4 °F (36.9 °C)] 98.4 °F (36.9 °C)  Heart Rate:  [] 89  Resp:  [16-22] 18  BP: (106-118)/(72-79) 118/72    Physical Exam:  /72 (BP Location: Left arm, Patient Position: Lying)  Pulse 89  Temp 98.4 °F (36.9 °C) (Oral)   Resp 18  Ht 195.6 cm (77.01\")  Wt 119 kg (263 lb 3.2 oz)  SpO2 95%  BMI 31.2 kg/m2  Body mass index is 31.2 kg/(m^2).    General: awake  Head: Normocephalic, no trauma  Eyes: no scleral icterus, no conjunctival pallor, no conjunctival hemorrhages.   ENT: MM dry, OP clear, no thrush. Fair dentition.   Neck: Supple  Cardiovascular: NR, RR, no murmurs, rubs, or gallops; no LE edema  Respiratory: lungs are clear today; no wheezing on ambient air  GI: Abdomen is soft, non-tender, non-distended  Musculoskeletal: no joint abnormalities, normal musculature  Skin: RUE and RLE erythema are improved, mild RUE TTP  Neurological: Alert and oriented x 3, 5/5 strength in LEs; 5/5 strength in UEs  Psychiatric: Normal mood and affect   Vasc: no cyanosis; PIV w/o erythema    Antibiotics:  PCN G 4 million units q4h    LABS:  CBC  labs reviewed today  Lab Results   Component Value Date    WBC 8.47 01/02/2018    HGB 11.1 (L) 01/02/2018    HCT 35.5 (L) 01/02/2018    MCV 95.9 01/02/2018     01/02/2018     Lab Results   Component Value Date    GLUCOSE 132 (H) 12/30/2017    BUN 15 12/30/2017    CREATININE 0.45 (L) 12/30/2017    EGFRIFNONA >150 12/30/2017    BCR 33.3 (H) 12/30/2017    CO2 26.1 12/30/2017    CALCIUM 8.3 (L) 12/30/2017    ALBUMIN 2.00 (L) 12/29/2017    LABIL2 0.5 12/29/2017    AST 29 12/29/2017    ALT 41 " 12/29/2017    CRP 5.55 (H) 12/29/2017     Lab Results   Component Value Date    HGBA1C 11.44 (H) 12/23/2017     Lab Results   Component Value Date    CKTOTAL 110 12/23/2017     Hep C Ab positive  HCV RNA 6.2 million and genotype 2b    Microbiology:  [facility] BCX 12/22: Group B Strep in 2/2 sets (sensitive to PCN and ceftriaxone)  BCx: 12/23 negative    Radiology (personally reviewed):   MRI reviewed w/ radiologist. ? Subdural abscess. Fluid at facet joint.     Assessment/Plan   1. Group B Strep septicemia secondary to IV drug use  -continue PCN G 4 million units q4h   -repeat blood cultures negative to date  -ARUNA negative  -repeat CBC in AM    2. Lumbar and thoracic spine epidural abscesses  - operative debridement tomorrow after 5-day ASA washout period  -please send operative cultures  - Continue antibiotic as per #1 above; will plan 6 weeks of antibiotics after I&D     3. History of IV drug abuse  -HIV negative; UDS with opiates     4. Right upper and lower extremity erythema and edema  -cephalic vein infection per CT; no abscess    5. Uncontrolled DM2 in obese     6. Chronic Hep C   -HCV RNA 6.2 million and genotype 2b  -will address treatment as outpatient    ID will follow. Case was previously discussed with Dr DENISA Donnelly.

## 2018-01-02 NOTE — PLAN OF CARE
Problem: Patient Care Overview (Adult)  Goal: Plan of Care Review  Outcome: Ongoing (interventions implemented as appropriate)   01/02/18 9861   Coping/Psychosocial Response Interventions   Plan Of Care Reviewed With patient   Outcome Evaluation   Outcome Summary/Follow up Plan Pt agreeable to PT this pm. He complains of increased back and R LE pain. Pt able to sit EOB with mod/max A x 2. Limited time sitting up 2' pt complaining of increased swelling in R LE. Plans for lumbar decompression tomorrow. Will follow up post op.

## 2018-01-02 NOTE — PLAN OF CARE
Problem: Patient Care Overview (Adult)  Goal: Plan of Care Review  Outcome: Ongoing (interventions implemented as appropriate)   01/02/18 0356   Coping/Psychosocial Response Interventions   Plan Of Care Reviewed With patient   Patient Care Overview   Progress no change   Outcome Evaluation   Outcome Summary/Follow up Plan Pt tolerating puree/honey thick diet, though he is displeased with it. Pain management continues to be an issue; taking scheduled and prn medication as soon as he is able. Q. 4 IV penicillin continues. NPO since midnight and pm dose of lovenox held in case neuro takes for surgery in am. VSS     Goal: Discharge Needs Assessment  Outcome: Ongoing (interventions implemented as appropriate)   01/02/18 0356   Discharge Needs Assessment   Concerns To Be Addressed basic needs concerns;substance/tobacco abuse/use concerns       Problem: Fall Risk (Adult)  Goal: Absence of Falls  Outcome: Ongoing (interventions implemented as appropriate)   01/02/18 0356   Fall Risk (Adult)   Absence of Falls making progress toward outcome       Problem: Pressure Ulcer Risk (Antonio Scale) (Adult,Obstetrics,Pediatric)  Goal: Skin Integrity  Outcome: Ongoing (interventions implemented as appropriate)   01/02/18 0356   Pressure Ulcer Risk (Antonio Scale) (Adult,Obstetrics,Pediatric)   Skin Integrity making progress toward outcome       Problem: Respiratory Insufficiency (Adult)  Goal: Effective Ventilation  Outcome: Ongoing (interventions implemented as appropriate)   01/02/18 0356   Respiratory Insufficiency (Adult)   Effective Ventilation making progress toward outcome     Goal: Effective Ventilation  Outcome: Ongoing (interventions implemented as appropriate)   01/02/18 0356   Respiratory Insufficiency (Adult)   Effective Ventilation making progress toward outcome

## 2018-01-02 NOTE — PLAN OF CARE
Problem: Patient Care Overview (Adult)  Goal: Plan of Care Review  Outcome: Ongoing (interventions implemented as appropriate)   01/02/18 1241   Coping/Psychosocial Response Interventions   Plan Of Care Reviewed With patient   Patient Care Overview   Progress no change   Outcome Evaluation   Outcome Summary/Follow up Plan Pt. tolerating pureed with honey thick, but does not like texture. Pt. to be NPO at [facility] for surgery in am. Pain control is an issue due to abcsess in spinal area, medicating as needed. Porr response to IV antibiotics. Skin intact with some heel rednes, boots applied for off loading. Enc to change position as tolerated. Will continue to monitor. 01/02/18       Problem: Fall Risk (Adult)  Goal: Absence of Falls  Outcome: Ongoing (interventions implemented as appropriate)      Problem: Pressure Ulcer Risk (Antonio Scale) (Adult,Obstetrics,Pediatric)  Goal: Skin Integrity  Outcome: Ongoing (interventions implemented as appropriate)      Problem: Respiratory Insufficiency (Adult)  Goal: Effective Ventilation  Outcome: Ongoing (interventions implemented as appropriate)    Goal: Effective Ventilation  Outcome: Ongoing (interventions implemented as appropriate)

## 2018-01-02 NOTE — THERAPY TREATMENT NOTE
Acute Care - Physical Therapy Treatment Note  [facility]     Patient Name: Sam Barrett  : 1960  MRN: [id number]  Today's Date: 2018  Onset of Illness/Injury or Date of Surgery Date: 17          Admit Date: 2017    Visit Dx:    ICD-10-CM ICD-9-CM   1. Abnormal MRI, lumbar spine R93.7 793.7     Patient Active Problem List   Diagnosis   • Sepsis   • Abnormal MRI, lumbar spine   • Diabetes   • Abscess in epidural space of lumbar spine   • Abscess in epidural space of thoracic spine               Adult Rehabilitation Note       18 1525 17 1013       Rehab Assessment/Intervention    Discipline physical therapist  -EJ physical therapist  -KH     Document Type therapy note (daily note)  -EJ therapy note (daily note)  -KH     Subjective Information agree to therapy;complains of;pain  -EJ agree to therapy  -KH     Patient Effort, Rehab Treatment adequate  -EJ adequate  -KH     Symptoms Noted During/After Treatment increased pain  -EJ increased pain  -KH     Precautions/Limitations fall precautions  -EJ fall precautions  -KH     Recorded by [EJ] Dana Farias, PT [KH] Marianela Pedro, PT     Pain Assessment    Pain Assessment 0-10  -EJ      Pain Score 7  -EJ 8  -KH     Pain Location Back  -EJ Hip  -KH     Pain Orientation  Left   also R ankle and R shoulder  -KH     Pain Intervention(s)  Repositioned  -KH     Response to Interventions  tolerated  -KH     Recorded by [EJ] Dana Farias, PT [KH] Marianela Pedro, PT     Cognitive Assessment/Intervention    Current Cognitive/Communication Assessment functional  -EJ      Recorded by [EJ] Dana Farias, PT      Bed Mobility, Assessment/Treatment    Bed Mobility, Assistive Device  bed rails  -KH     Bed Mob, Supine to Sit, [place] verbal cues required;nonverbal cues required (demo/gesture);moderate assist (50% patient effort);maximum assist (25% patient effort);2 person assist required  -EJ moderate assist (50%  patient effort);2 person assist required  -     Bed Mob, Sit to Supine, [place] verbal cues required;nonverbal cues required (demo/gesture);moderate assist (50% patient effort);maximum assist (25% patient effort);2 person assist required  -EJ moderate assist (50% patient effort);2 person assist required  -KH     Recorded by [EJ] Dana Farias, PT [KH] Marianela Pedro, PT     Transfer Assessment/Treatment    Transfers, Bed-Chair [place] unable to perform;not appropriate to assess  -EJ      Transfers, Sit-Stand [place]  not tested  -KH     Transfers, Stand-Sit [place]  not tested  -KH     Transfer, Comment  pt refused secondary to pain in BLE  -KH     Recorded by [EJ] Dana Farias, PT [KH] Marianela Pedro, PT     Gait Assessment/Treatment    Gait, [place] Level unable to perform;not appropriate to assess  -EJ not tested;not appropriate to assess  -KH     Recorded by [EJ] Dana Farias, PT [KH] Marianela Pedro, PT     Balance Skills Training    Sitting-Level of Assistance Close supervision  -EJ Contact guard  -     Sitting-Balance Support Feet supported;Right upper extremity supported;Left upper extremity supported  -EJ Right upper extremity supported;Left upper extremity supported  -     Sitting # of Minutes 3   pt states his R foot is starting to swell & needs to lay katharina  -EJ 8  -KH     Recorded by [EJ] Dana Farias, PT [KH] Marianela Pedro, PT     Therapy Exercises    Bilateral Lower Extremities  AROM:;10 reps;ankle pumps/circles;LAQ;AAROM:;heel slides;hip abduction/adduction   also bilateral hamstring, piriformis and TFL stretching  -KH     Recorded by  [KH] Marianela Pedro, PT     Positioning and Restraints    Pre-Treatment Position in bed  -EJ in bed  -KH     Post Treatment Position bed  -EJ bed  -KH     In Bed notified nsg;supine;call light within reach;encouraged to call for assist;exit alarm on  -EJ fowlers;call light within  reach;encouraged to call for assist;exit alarm on;with nsg   NA in room  -KH     Recorded by [EJ] Dana Farias, PT [KH] Marianela Pedro, PT       User Key  (r) = Recorded By, (t) = Taken By, (c) = Cosigned By    Initials Name Effective Dates    KH Marianela Pedro, PT 05/18/15 -     EJ Dana Farias, PT 04/21/17 -                 IP PT Goals       12/26/17 1303          Bed Mobility PT LTG    Bed Mobility PT LTG, Time to Achieve 1 wk  -CH      Bed Mobility PT LTG, Activity Type all bed mobility  -CH      Bed Mobility PT LTG, [place] Level contact guard assist  -CH      Transfer Training PT LTG    Transfer Training PT LTG, Time to Achieve 1 wk  -CH      Transfer Training PT LTG, Activity Type all transfers  -CH      Transfer Training PT LTG, [place] Level contact guard assist  -CH      Gait Training PT LTG    Gait Training Goal PT LTG, Time to Achieve 1 wk  -CH      Gait Training Goal PT LTG, [place] Level contact guard assist  -CH      Gait Training Goal PT LTG, Distance to Achieve 100  -CH        User Key  (r) = Recorded By, (t) = Taken By, (c) = Cosigned By    Initials Name Provider Type    DAVY Ko, PT Physical Therapist          Physical Therapy Education     Title: PT OT SLP Therapies (Done)     Topic: Physical Therapy (Done)     Point: Mobility training (Done)    Learning Progress Summary    Learner Readiness Method Response Comment Documented by Status   Patient Acceptance E,D VU,NR   01/02/18 1549 Done    Acceptance D VU,NR   12/31/17 1017 Done    Acceptance E VU  MA 12/29/17 1034 Done    Acceptance E,D NR,VU   12/28/17 1330 Done    Acceptance E,TB,D VU,NR   12/27/17 1617 Done    Acceptance E,TB,D VU,NR   12/26/17 1303 Done               Point: Home exercise program (Done)    Learning Progress Summary    Learner Readiness Method Response Comment Documented by Status   Patient Acceptance D VU,NR   12/31/17 1017 Done    Acceptance E,TB,D VU,[facility]  12/26/17 1303 Done               Point: Body mechanics (Done)    Learning Progress Summary    Learner Readiness Method Response Comment Documented by Status   Patient Acceptance E,D VU,NR  EJ 01/02/18 1549 Done    Acceptance D VU,NR   12/31/17 1017 Done    Acceptance E VU  MA 12/29/17 1034 Done    Acceptance E,D NR,VU   12/28/17 1330 Done    Acceptance E,TB,D VU,NR   12/27/17 1617 Done    Acceptance E,TB,D VU,NR   12/26/17 1303 Done               Point: Precautions (Done)    Learning Progress Summary    Learner Readiness Method Response Comment Documented by Status   Patient Acceptance D VU,NR   12/31/17 1017 Done    Acceptance E VU  MA 12/29/17 1034 Done    Acceptance E,TB,D VU,NR   12/27/17 1617 Done    Acceptance E,TB,D VU,NR   12/26/17 1303 Done                      User Key     Initials Effective Dates Name Provider Type Discipline     05/18/15 -  Marianela Pedro, PT Physical Therapist PT     12/01/15 -  Sheridan Ko, PT Physical Therapist PT     04/21/17 -  Dana Farias, PT Physical Therapist PT    MA 12/13/16 -  Mariann Vang, PT Physical Therapist PT                    PT Recommendation and Plan  Anticipated Discharge Disposition: skilled nursing facility  Planned Therapy Interventions: balance training, bed mobility training, gait training, home exercise program, patient/family education, transfer training  PT Frequency: daily  Plan of Care Review  Plan Of Care Reviewed With: patient  Outcome Summary/Follow up Plan: Pt agreeable to PT this pm. He complains of increased back and R LE pain. Pt able to sit EOB with mod/max A x 2. Limited time sitting up 2' pt complaining of increased swelling in R LE. Plans for lumbar decompression tomorrow. Will follow up post op.          Outcome Measures       01/02/18 1500 12/31/17 1020       How much help from another person do you currently need...    Turning from your back to your side while in flat bed without using bedrails? 2   -EJ 2  -KH     Moving from lying on back to sitting on the side of a flat bed without bedrails? 2  -EJ 2  -KH     Moving to and from a bed to a chair (including a wheelchair)? 1  -EJ 1  -KH     Standing up from a chair using your arms (e.g., wheelchair, bedside chair)? 1  -EJ 1  -KH     Climbing 3-5 steps with a railing? 1  -EJ 1  -KH     To walk in hospital room? 1  -EJ 1  -KH     AM-PAC 6 Clicks Score 8  -EJ 8  -KH     Functional Assessment    Outcome Measure Options AM-PAC 6 Clicks Basic Mobility (PT)  -EJ AM-PAC 6 Clicks Basic Mobility (PT)  -KH       User Key  (r) = Recorded By, (t) = Taken By, (c) = Cosigned By    Initials Name Provider Type    MANDI Pedro, PT Physical Therapist    EJ Dana Farias, PT Physical Therapist           Time Calculation:         PT Charges       01/02/18 1550          Time Calculation    Start Time 1525  -EJ      Stop Time 1540  -EJ      Time Calculation (min) 15 min  -EJ      PT Received On 01/02/18  -EJ      PT - Next Appointment 01/04/18  -EJ        User Key  (r) = Recorded By, (t) = Taken By, (c) = Cosigned By    Initials Name Provider Type    RYLAND Farias, PT Physical Therapist          Therapy Charges for Today     Code Description Service Date Service Provider Modifiers Qty    56872598589 HC PT THER PROC EA 15 MIN 1/2/2018 Dana Farais, PT GP 1    59004088456 HC PT THER SUPP EA 15 MIN 1/2/2018 Dana Farias, PT GP 1          PT G-Codes  Outcome Measure Options: AM-PAC 6 Clicks Basic Mobility (PT)    Dana Farias, PT  1/2/2018

## 2018-01-02 NOTE — PROGRESS NOTES
[facility] PROGRESS NOTE    PATIENT IDENTIFICATION:   Name:  Sam Barrett      MRN:  [id number]     57 y.o.  male               CC: Thoracic and lumbar epidural abscess      Subjective     Interval History: has complaints being hungry as well as back pain and neck pain and arm pain and knee pain.  Voiding spontaneously.  He has had a bowel movement.    Objective     Vital signs in last 24 hours:  Temp:  [98.1 °F (36.7 °C)-98.4 °F (36.9 °C)] 98.4 °F (36.9 °C)  Heart Rate:  [] 89  Resp:  [16-22] 18  BP: (106-118)/(72-79) 118/72  ICP ranges-    Intake/Output last 3 shifts:  I/O last 3 completed shifts:  In: -   Out: 5100 [Urine:5100]    Intake/Output this shift:         Physical Exam:  General:   Awake, alert, and oriented x 3. NAD    CN III IV VI: Extraocular movements are full , PERRL   CN V: Normal facial sensation and strength of muscles of mastication.  CN VII: Facial movements are symmetric. No weakness.      Motor: Normal muscle strength, bulk and tone in upper and lower extremities.  No fasciculations, rigidity, spasticity, or abnormal movements.  His lower extremity movement is limited by his low back pain but as is 5 minus out of 5 bilaterally.  Reflexes: 2+ in the upper and lower extremities. Plantar responses are flexor.  Sensation: Normal to light touch, pinprick, vibration, temperature, and proprioception in arms and legs. Normal graphesthesia and no extinction on DSS.  Station and Gait: Normal gait and station.    Coordination: Finger to nose test shows no dysmetria.  Rapid alternating movements are normal.  Heel to shin normal.  Extremities:  Patient is not wearing ADRIAN and SCD bilaterally    LABS:    Lab Results (last 24 hours)     Procedure Component Value Units Date/Time    POC Glucose Once [[id number]]  (Abnormal) Collected:  01/01/18 0850    Specimen:  Blood Updated:  01/01/18 0901     Glucose 133 (H) mg/dL     Narrative:       Meter: [id number] : [id number] Mauricio  Rosemarie NA    POC Glucose Once [[id number]]  (Abnormal) Collected:  01/01/18 1117    Specimen:  Blood Updated:  01/01/18 1129     Glucose 143 (H) mg/dL     Narrative:       Meter: [id number] : [id number] Mauricio Houston NA    POC Glucose Once [[id number]]  (Abnormal) Collected:  01/01/18 1620    Specimen:  Blood Updated:  01/01/18 1622     Glucose 140 (H) mg/dL     Narrative:       Meter: [id number] : [id number] Wood REYNOLDS NA    POC Glucose Once [[id number]]  (Abnormal) Collected:  01/01/18 2059    Specimen:  Blood Updated:  01/01/18 2126     Glucose 150 (H) mg/dL     Narrative:       Meter: [id number] : [id number] Wood REYNOLDS NA    CBC & Differential [[id number]] Collected:  01/02/18 0727    Specimen:  Blood Updated:  01/02/18 0801    Narrative:       The following orders were created for panel order CBC & Differential.  Procedure                               Abnormality         Status                     ---------                               -----------         ------                     CBC Auto Differential[[id number]]        Abnormal            Final result                 Please view results for these tests on the individual orders.    CBC Auto Differential [[id number]]  (Abnormal) Collected:  01/02/18 0727    Specimen:  Blood Updated:  01/02/18 0801     WBC 8.47 10*3/mm3      RBC 3.70 (L) 10*6/mm3      Hemoglobin 11.1 (L) g/dL      Hematocrit 35.5 (L) %      MCV 95.9 fL      MCH 30.0 pg      MCHC 31.3 (L) g/dL      RDW 14.4 %      RDW-SD 49.8 fl      MPV 10.5 fL      Platelets 327 10*3/mm3      Neutrophil % 68.7 %      Lymphocyte % 21.3 %      Monocyte % 8.1 %      Eosinophil % 1.2 %      Basophil % 0.1 %      Immature Grans % 0.6 (H) %      Neutrophils, Absolute 5.82 10*3/mm3      Lymphocytes, Absolute 1.80 10*3/mm3      Monocytes, Absolute 0.69 10*3/mm3      Eosinophils, Absolute 0.10 10*3/mm3      Basophils, Absolute 0.01 10*3/mm3      Immature Grans, Absolute 0.05 (H) 10*3/mm3     POC  Glucose Once [[id number]]  (Abnormal) Collected:  01/02/18 0735    Specimen:  Blood Updated:  01/02/18 0736     Glucose 157 (H) mg/dL     Narrative:       Meter: [id number] : [id number] Ha Puckett ANDREINA          IMAGING STUDIES:    I personally viewed and interpreted the patient's Thoracic and lumbar MRI.  Demonstrated epidural abscess on the dorsal surface of the thecal sac.  This was not present on his prior MRI.  He does have lumbar stenosis at L4 5.      Meds reviewed/changed: Yes    Current Facility-Administered Medications   Medication Dose Route Frequency Provider Last Rate Last Dose   • acetaminophen (TYLENOL) tablet 650 mg  650 mg Oral Q4H PRN Eusebio Farnsworth MD   650 mg at 12/31/17 0516    Or   • acetaminophen (TYLENOL) suppository 650 mg  650 mg Rectal Q4H PRN Eusebio Farnsworth MD       • atorvastatin (LIPITOR) tablet 40 mg  40 mg Oral Daily Urban Mason MD   40 mg at 01/02/18 0820   • dextrose (D50W) solution 25 g  25 g Intravenous Q15 Min PRN Eusebio Farnsworth MD       • dextrose (GLUTOSE) oral gel 15 g  15 g Oral Q15 Min PRN Eusebio Farnsworth MD       • enoxaparin (LOVENOX) syringe 40 mg  40 mg Subcutaneous Q24H Urban Mason MD   Stopped at 12/29/17 2016   • glipiZIDE (GLUCOTROL) tablet 10 mg  10 mg Oral QAM AC Juan Nicholson MD   10 mg at 01/02/18 0820   • glucagon (human recombinant) (GLUCAGEN DIAGNOSTIC) injection 1 mg  1 mg Subcutaneous Q15 Min PRN Eusebio Farnsworth MD       • Influenza Vac Subunit Quad (FLUCELVAX) injection 0.5 mL  0.5 mL Intramuscular During Hospitalization Eusebio Farnsworth MD       • insulin aspart (novoLOG) injection 2-5 Units  2-5 Units Subcutaneous 4x Daily AC & at Bedtime Ever Gongora MD   2 Units at 12/31/17 1113   • ipratropium-albuterol (DUO-NEB) nebulizer solution 3 mL  3 mL Nebulization 4x Daily - RT Eusebio Farnsworth MD   3 mL at 01/02/18 0828   • Magnesium Sulfate 2 gram infusion - Mg less than or equal to 1.5 mg/dL   2 g Intravenous PRN Eusebio Farnsworth MD        Or   • Magesium Sulfate 1 gram infusion - Mg 1.6-1.9 mg/dL  1 g Intravenous PRN Eusebio Farnsworth MD       • metFORMIN (GLUCOPHAGE) tablet 500 mg  500 mg Oral BID With Meals Juan Nicholson MD   500 mg at 01/02/18 0821   • metoprolol tartrate (LOPRESSOR) tablet 50 mg  50 mg Oral Q8H CHATA Hebert   50 mg at 01/02/18 0820   • Morphine (MS CONTIN) 12 hr tablet 30 mg  30 mg Oral Q12H Ethan Frank MD   30 mg at 01/02/18 0821   • ondansetron (ZOFRAN) injection 4 mg  4 mg Intravenous Q6H PRN Eusebio Farnsworth MD       • oxyCODONE (ROXICODONE) immediate release tablet 15 mg  15 mg Oral Q6H PRN Urban Mason MD   15 mg at 01/02/18 0116   • penicillin G potassium 4 Million Units in sodium chloride 0.9 % 100 mL IVPB  4 Million Units Intravenous Q4H Bowen Ford MD   4 Million Units at 01/02/18 0618   • potassium chloride (MICRO-K) CR capsule 40 mEq  40 mEq Oral PRN Eusebio Farnsworth MD   40 mEq at 12/27/17 2316    Or   • potassium chloride (KLOR-CON) packet 40 mEq  40 mEq Oral PRN Eusebio Farnsworth MD        Or   • potassium chloride 10 mEq in 100 mL IVPB  10 mEq Intravenous Q1H PRN Eusebio Farnsworth  mL/hr at 12/27/17 0241 10 mEq at 12/27/17 0241   • sodium chloride 0.9 % flush 1-10 mL  1-10 mL Intravenous PRN Eusebio Farnsworth MD           Assessment/Plan     ASSESSMENT:    Active Problems:    Sepsis    Abnormal MRI, lumbar spine    Diabetes      PLAN: As per Dr. Donnelly's suggestion we will wait until tomorrow which will be 5 days since his last dose of aspirin to perform a lumbar decompression.  This will also include a thoracic decompression.    The risks, benefits, and alternatives of lumbar and thoracic decompression  were explained in detail to the patient. The alternative is not to perform an operative procedure. The benefit should be, though is not guaranteed, primarily a potential reduction in the neurosensory  and/or motor dysfunction; secondarily, a possible reduction in back pain. The risks include, but are not limited to, the possibility of death, infection, stroke, bleeding, blindness, paralysis, blindness, lack of improvement in the patient's pain syndrome, worsening of the pain syndrome, meningitis, osteomyelitis, recurrent disc herniation or stenosis, need for further operative intervention, recurrence of the pain syndrome, sexual dysfunction, incontinence, worsening of inability to urinate without catheterization, inability to have a normal bowel movement, epidural scarring resulting in chronic back pain, leakage of cerebral spinal fluid at the time of surgery or after recovery from surgery requiring further operative intervention,spinal instability. I also explained the realistic expectations as they pertain to the procedure. The patient voiced understanding of these risks, benefits, alternatives, and realistic expectations and requests that we proceed with the operative intervention.    Plan: T9 through S1 posterior lumbar decompression for epidural abscess.    I discussed the patients findings and my recommendations with patient and nursing staff       LOS: 10 days       Nghia Esparza MD  1/2/2018  8:36 AM

## 2018-01-02 NOTE — PROGRESS NOTES
"  ENDOCRINE    Subjective   AND PLANS  Sam Barrett is a 57 y.o. male.     Follow-up diabetes.  Patient is now on puréed diet.  Denies nausea, vomiting or diarrhea.  Fasting glucose 155.  Random glucose .  Continue glipizide, metformin and NovoLog as needed.  Plans for surgery noted.  Afebrile on penicillin G per ID.      Objective   /65 (BP Location: Left arm, Patient Position: Lying)  Pulse 98  Temp 98.3 °F (36.8 °C) (Oral)   Resp 18  Ht 195.6 cm (77.01\")  Wt 119 kg (263 lb 3.2 oz)  SpO2 94%  BMI 31.2 kg/m2  Physical Exam    Awake, alert, not in distress.    No rales or wheezes  Regular heart rate and rhythm.  Abdomen soft, nontender.  No calf tenderness        Lab Results (last 24 hours)     Procedure Component Value Units Date/Time    POC Glucose Once [[id number]]  (Abnormal) Collected:  01/01/18 2059    Specimen:  Blood Updated:  01/01/18 2126     Glucose 150 (H) mg/dL     Narrative:       Meter: [id number] : [id number] Wood HDZ    POC Glucose Once [[id number]]  (Abnormal) Collected:  01/02/18 0735    Specimen:  Blood Updated:  01/02/18 0736     Glucose 157 (H) mg/dL     Narrative:       Meter: [id number] : [id number] Ha HDZ    CBC & Differential [[id number]] Collected:  01/02/18 0727    Specimen:  Blood Updated:  01/02/18 0801    Narrative:       The following orders were created for panel order CBC & Differential.  Procedure                               Abnormality         Status                     ---------                               -----------         ------                     CBC Auto Differential[[id number]]        Abnormal            Final result                 Please view results for these tests on the individual orders.    CBC Auto Differential [[id number]]  (Abnormal) Collected:  01/02/18 0727    Specimen:  Blood Updated:  01/02/18 0801     WBC 8.47 10*3/mm3      RBC 3.70 (L) 10*6/mm3      Hemoglobin 11.1 (L) g/dL      Hematocrit 35.5 (L) %      MCV " 95.9 fL      MCH 30.0 pg      MCHC 31.3 (L) g/dL      RDW 14.4 %      RDW-SD 49.8 fl      MPV 10.5 fL      Platelets 327 10*3/mm3      Neutrophil % 68.7 %      Lymphocyte % 21.3 %      Monocyte % 8.1 %      Eosinophil % 1.2 %      Basophil % 0.1 %      Immature Grans % 0.6 (H) %      Neutrophils, Absolute 5.82 10*3/mm3      Lymphocytes, Absolute 1.80 10*3/mm3      Monocytes, Absolute 0.69 10*3/mm3      Eosinophils, Absolute 0.10 10*3/mm3      Basophils, Absolute 0.01 10*3/mm3      Immature Grans, Absolute 0.05 (H) 10*3/mm3     POC Glucose Once [[id number]]  (Normal) Collected:  01/02/18 1123    Specimen:  Blood Updated:  01/02/18 1126     Glucose 125 mg/dL     Narrative:       Meter: [id number] : [id number] Ha HDZ    POC Glucose Once [[id number]]  (Normal) Collected:  01/02/18 1642    Specimen:  Blood Updated:  01/02/18 1645     Glucose 90 mg/dL     Narrative:       Meter: [id number] : [id number] Ha HDZ            Principal Problem:    Sepsis  Active Problems:    Abnormal MRI, lumbar spine    Diabetes    Abscess in epidural space of lumbar spine    Abscess in epidural space of thoracic spine    Continue metformin, glipizide and NovoLog as needed

## 2018-01-03 ENCOUNTER — APPOINTMENT (OUTPATIENT)
Dept: GENERAL RADIOLOGY | Facility: HOSPITAL | Age: 58
End: 2018-01-03
Attending: NEUROLOGICAL SURGERY

## 2018-01-03 ENCOUNTER — APPOINTMENT (OUTPATIENT)
Dept: GENERAL RADIOLOGY | Facility: HOSPITAL | Age: 58
End: 2018-01-03

## 2018-01-03 ENCOUNTER — ANESTHESIA EVENT (OUTPATIENT)
Dept: PERIOP | Facility: HOSPITAL | Age: 58
End: 2018-01-03

## 2018-01-03 ENCOUNTER — APPOINTMENT (OUTPATIENT)
Dept: CARDIOLOGY | Facility: HOSPITAL | Age: 58
End: 2018-01-03

## 2018-01-03 ENCOUNTER — ANESTHESIA (OUTPATIENT)
Dept: PERIOP | Facility: HOSPITAL | Age: 58
End: 2018-01-03

## 2018-01-03 LAB
ALBUMIN SERPL-MCNC: 2.5 G/DL (ref 3.5–5.2)
ALBUMIN/GLOB SERPL: 0.6 G/DL
ALP SERPL-CCNC: 91 U/L (ref 39–117)
ALT SERPL W P-5'-P-CCNC: 42 U/L (ref 1–41)
ANION GAP SERPL CALCULATED.3IONS-SCNC: 9.4 MMOL/L
AST SERPL-CCNC: 37 U/L (ref 1–40)
BILIRUB SERPL-MCNC: 1 MG/DL (ref 0.1–1.2)
BUN BLD-MCNC: 14 MG/DL (ref 6–20)
BUN/CREAT SERPL: 24.1 (ref 7–25)
CALCIUM SPEC-SCNC: 8.8 MG/DL (ref 8.6–10.5)
CHLORIDE SERPL-SCNC: 98 MMOL/L (ref 98–107)
CO2 SERPL-SCNC: 26.6 MMOL/L (ref 22–29)
CREAT BLD-MCNC: 0.58 MG/DL (ref 0.76–1.27)
DEPRECATED RDW RBC AUTO: 50.2 FL (ref 37–54)
ERYTHROCYTE [DISTWIDTH] IN BLOOD BY AUTOMATED COUNT: 14.4 % (ref 11.5–14.5)
GFR SERPL CREATININE-BSD FRML MDRD: 144 ML/MIN/1.73
GLOBULIN UR ELPH-MCNC: 4 GM/DL
GLUCOSE BLD-MCNC: 151 MG/DL (ref 65–99)
GLUCOSE BLDC GLUCOMTR-MCNC: 136 MG/DL (ref 70–130)
GLUCOSE BLDC GLUCOMTR-MCNC: 141 MG/DL (ref 70–130)
GLUCOSE BLDC GLUCOMTR-MCNC: 146 MG/DL (ref 70–130)
GLUCOSE BLDC GLUCOMTR-MCNC: 192 MG/DL (ref 70–130)
HCT VFR BLD AUTO: 30 % (ref 40.4–52.2)
HCT VFR BLD AUTO: 37.6 % (ref 40.4–52.2)
HGB BLD-MCNC: 11.5 G/DL (ref 13.7–17.6)
HGB BLD-MCNC: 9.4 G/DL (ref 13.7–17.6)
MCH RBC QN AUTO: 29.4 PG (ref 27–32.7)
MCHC RBC AUTO-ENTMCNC: 30.6 G/DL (ref 32.6–36.4)
MCV RBC AUTO: 96.2 FL (ref 79.8–96.2)
PLATELET # BLD AUTO: 343 10*3/MM3 (ref 140–500)
PMV BLD AUTO: 10.6 FL (ref 6–12)
POTASSIUM BLD-SCNC: 4 MMOL/L (ref 3.5–5.2)
PROT SERPL-MCNC: 6.5 G/DL (ref 6–8.5)
RBC # BLD AUTO: 3.91 10*6/MM3 (ref 4.6–6)
SODIUM BLD-SCNC: 134 MMOL/L (ref 136–145)
WBC NRBC COR # BLD: 9.35 10*3/MM3 (ref 4.5–10.7)

## 2018-01-03 PROCEDURE — 25010000002 VANCOMYCIN 10 G RECONSTITUTED SOLUTION: Performed by: NEUROLOGICAL SURGERY

## 2018-01-03 PROCEDURE — 99232 SBSQ HOSP IP/OBS MODERATE 35: CPT | Performed by: INTERNAL MEDICINE

## 2018-01-03 PROCEDURE — 87075 CULTR BACTERIA EXCEPT BLOOD: CPT | Performed by: NEUROLOGICAL SURGERY

## 2018-01-03 PROCEDURE — 25010000002 MIDAZOLAM PER 1 MG: Performed by: ANESTHESIOLOGY

## 2018-01-03 PROCEDURE — 87176 TISSUE HOMOGENIZATION CULTR: CPT | Performed by: NEUROLOGICAL SURGERY

## 2018-01-03 PROCEDURE — 93971 EXTREMITY STUDY: CPT

## 2018-01-03 PROCEDURE — 25010000002 HYDROMORPHONE PER 4 MG: Performed by: NURSE ANESTHETIST, CERTIFIED REGISTERED

## 2018-01-03 PROCEDURE — 69990 MICROSURGERY ADD-ON: CPT | Performed by: NEUROLOGICAL SURGERY

## 2018-01-03 PROCEDURE — 00NX0ZZ RELEASE THORACIC SPINAL CORD, OPEN APPROACH: ICD-10-PCS | Performed by: NEUROLOGICAL SURGERY

## 2018-01-03 PROCEDURE — 87116 MYCOBACTERIA CULTURE: CPT | Performed by: NEUROLOGICAL SURGERY

## 2018-01-03 PROCEDURE — 25010000002 ALBUMIN HUMAN 5% PER 50 ML: Performed by: NURSE ANESTHETIST, CERTIFIED REGISTERED

## 2018-01-03 PROCEDURE — C1751 CATH, INF, PER/CENT/MIDLINE: HCPCS | Performed by: ANESTHESIOLOGY

## 2018-01-03 PROCEDURE — 63267 EXCISE INTRSPINL LESION LMBR: CPT | Performed by: NEUROLOGICAL SURGERY

## 2018-01-03 PROCEDURE — 87102 FUNGUS ISOLATION CULTURE: CPT | Performed by: NEUROLOGICAL SURGERY

## 2018-01-03 PROCEDURE — 25010000002 FENTANYL CITRATE (PF) 100 MCG/2ML SOLUTION: Performed by: ANESTHESIOLOGY

## 2018-01-03 PROCEDURE — 87206 SMEAR FLUORESCENT/ACID STAI: CPT | Performed by: NEUROLOGICAL SURGERY

## 2018-01-03 PROCEDURE — 25010000002 PROPOFOL 10 MG/ML EMULSION: Performed by: NURSE ANESTHETIST, CERTIFIED REGISTERED

## 2018-01-03 PROCEDURE — 72080 X-RAY EXAM THORACOLMB 2/> VW: CPT

## 2018-01-03 PROCEDURE — 85014 HEMATOCRIT: CPT | Performed by: NURSE ANESTHETIST, CERTIFIED REGISTERED

## 2018-01-03 PROCEDURE — 85027 COMPLETE CBC AUTOMATED: CPT | Performed by: INTERNAL MEDICINE

## 2018-01-03 PROCEDURE — 87205 SMEAR GRAM STAIN: CPT | Performed by: INTERNAL MEDICINE

## 2018-01-03 PROCEDURE — 25010000002 FENTANYL CITRATE (PF) 100 MCG/2ML SOLUTION: Performed by: NURSE ANESTHETIST, CERTIFIED REGISTERED

## 2018-01-03 PROCEDURE — 63047 LAM FACETEC & FORAMOT LUMBAR: CPT | Performed by: NEUROLOGICAL SURGERY

## 2018-01-03 PROCEDURE — 88311 DECALCIFY TISSUE: CPT | Performed by: NEUROLOGICAL SURGERY

## 2018-01-03 PROCEDURE — 85018 HEMOGLOBIN: CPT | Performed by: NURSE ANESTHETIST, CERTIFIED REGISTERED

## 2018-01-03 PROCEDURE — 82962 GLUCOSE BLOOD TEST: CPT

## 2018-01-03 PROCEDURE — 63268 EXCISE INTRSPINL LESION SCRL: CPT | Performed by: NEUROLOGICAL SURGERY

## 2018-01-03 PROCEDURE — 94799 UNLISTED PULMONARY SVC/PX: CPT

## 2018-01-03 PROCEDURE — P9041 ALBUMIN (HUMAN),5%, 50ML: HCPCS | Performed by: NURSE ANESTHETIST, CERTIFIED REGISTERED

## 2018-01-03 PROCEDURE — 87147 CULTURE TYPE IMMUNOLOGIC: CPT | Performed by: INTERNAL MEDICINE

## 2018-01-03 PROCEDURE — 25010000002 PENICILLIN G POTASSIUM PER 600000 UNITS: Performed by: INTERNAL MEDICINE

## 2018-01-03 PROCEDURE — 63710000001 INSULIN ASPART PER 5 UNITS: Performed by: INTERNAL MEDICINE

## 2018-01-03 PROCEDURE — 87070 CULTURE OTHR SPECIMN AEROBIC: CPT | Performed by: INTERNAL MEDICINE

## 2018-01-03 PROCEDURE — 63266 EXCISE INTRSPINL LESION THRC: CPT | Performed by: NEUROLOGICAL SURGERY

## 2018-01-03 PROCEDURE — 25010000002 VANCOMYCIN PER 500 MG: Performed by: NEUROLOGICAL SURGERY

## 2018-01-03 PROCEDURE — 87070 CULTURE OTHR SPECIMN AEROBIC: CPT | Performed by: NEUROLOGICAL SURGERY

## 2018-01-03 PROCEDURE — 25010000003 PENICILLIN G POTASSIUM PER 600000 UNITS: Performed by: INTERNAL MEDICINE

## 2018-01-03 PROCEDURE — 87205 SMEAR GRAM STAIN: CPT | Performed by: NEUROLOGICAL SURGERY

## 2018-01-03 PROCEDURE — 00NY0ZZ RELEASE LUMBAR SPINAL CORD, OPEN APPROACH: ICD-10-PCS | Performed by: NEUROLOGICAL SURGERY

## 2018-01-03 PROCEDURE — 76000 FLUOROSCOPY <1 HR PHYS/QHP: CPT

## 2018-01-03 PROCEDURE — 80053 COMPREHEN METABOLIC PANEL: CPT | Performed by: INTERNAL MEDICINE

## 2018-01-03 PROCEDURE — 88307 TISSUE EXAM BY PATHOLOGIST: CPT | Performed by: NEUROLOGICAL SURGERY

## 2018-01-03 PROCEDURE — 87147 CULTURE TYPE IMMUNOLOGIC: CPT | Performed by: NEUROLOGICAL SURGERY

## 2018-01-03 RX ORDER — NALOXONE HCL 0.4 MG/ML
0.1 VIAL (ML) INJECTION
Status: DISCONTINUED | OUTPATIENT
Start: 2018-01-03 | End: 2018-01-11 | Stop reason: HOSPADM

## 2018-01-03 RX ORDER — NALOXONE HCL 0.4 MG/ML
0.2 VIAL (ML) INJECTION AS NEEDED
Status: DISCONTINUED | OUTPATIENT
Start: 2018-01-03 | End: 2018-01-04 | Stop reason: HOSPADM

## 2018-01-03 RX ORDER — ROCURONIUM BROMIDE 10 MG/ML
INJECTION, SOLUTION INTRAVENOUS AS NEEDED
Status: DISCONTINUED | OUTPATIENT
Start: 2018-01-03 | End: 2018-01-03 | Stop reason: SURG

## 2018-01-03 RX ORDER — FENTANYL CITRATE 50 UG/ML
50 INJECTION, SOLUTION INTRAMUSCULAR; INTRAVENOUS
Status: DISCONTINUED | OUTPATIENT
Start: 2018-01-03 | End: 2018-01-04 | Stop reason: HOSPADM

## 2018-01-03 RX ORDER — ONDANSETRON 4 MG/1
4 TABLET, FILM COATED ORAL EVERY 6 HOURS PRN
Status: DISCONTINUED | OUTPATIENT
Start: 2018-01-03 | End: 2018-01-11 | Stop reason: HOSPADM

## 2018-01-03 RX ORDER — MIDAZOLAM HYDROCHLORIDE 1 MG/ML
2 INJECTION INTRAMUSCULAR; INTRAVENOUS
Status: DISCONTINUED | OUTPATIENT
Start: 2018-01-03 | End: 2018-01-03 | Stop reason: HOSPADM

## 2018-01-03 RX ORDER — SODIUM CHLORIDE, SODIUM LACTATE, POTASSIUM CHLORIDE, CALCIUM CHLORIDE 600; 310; 30; 20 MG/100ML; MG/100ML; MG/100ML; MG/100ML
100 INJECTION, SOLUTION INTRAVENOUS CONTINUOUS
Status: DISCONTINUED | OUTPATIENT
Start: 2018-01-03 | End: 2018-01-04

## 2018-01-03 RX ORDER — SODIUM CHLORIDE, SODIUM LACTATE, POTASSIUM CHLORIDE, CALCIUM CHLORIDE 600; 310; 30; 20 MG/100ML; MG/100ML; MG/100ML; MG/100ML
9 INJECTION, SOLUTION INTRAVENOUS CONTINUOUS
Status: DISCONTINUED | OUTPATIENT
Start: 2018-01-03 | End: 2018-01-04

## 2018-01-03 RX ORDER — ONDANSETRON 2 MG/ML
4 INJECTION INTRAMUSCULAR; INTRAVENOUS ONCE AS NEEDED
Status: DISCONTINUED | OUTPATIENT
Start: 2018-01-03 | End: 2018-01-04 | Stop reason: HOSPADM

## 2018-01-03 RX ORDER — DIPHENHYDRAMINE HYDROCHLORIDE 50 MG/ML
12.5 INJECTION INTRAMUSCULAR; INTRAVENOUS
Status: DISCONTINUED | OUTPATIENT
Start: 2018-01-03 | End: 2018-01-04 | Stop reason: HOSPADM

## 2018-01-03 RX ORDER — LIDOCAINE HYDROCHLORIDE 20 MG/ML
INJECTION, SOLUTION INFILTRATION; PERINEURAL AS NEEDED
Status: DISCONTINUED | OUTPATIENT
Start: 2018-01-03 | End: 2018-01-03 | Stop reason: SURG

## 2018-01-03 RX ORDER — FENTANYL CITRATE 50 UG/ML
50 INJECTION, SOLUTION INTRAMUSCULAR; INTRAVENOUS
Status: DISCONTINUED | OUTPATIENT
Start: 2018-01-03 | End: 2018-01-03 | Stop reason: HOSPADM

## 2018-01-03 RX ORDER — EPHEDRINE SULFATE 50 MG/ML
5 INJECTION, SOLUTION INTRAVENOUS ONCE AS NEEDED
Status: DISCONTINUED | OUTPATIENT
Start: 2018-01-03 | End: 2018-01-04 | Stop reason: HOSPADM

## 2018-01-03 RX ORDER — ALBUMIN, HUMAN INJ 5% 5 %
SOLUTION INTRAVENOUS CONTINUOUS PRN
Status: DISCONTINUED | OUTPATIENT
Start: 2018-01-03 | End: 2018-01-03 | Stop reason: SURG

## 2018-01-03 RX ORDER — MIDAZOLAM HYDROCHLORIDE 1 MG/ML
1 INJECTION INTRAMUSCULAR; INTRAVENOUS
Status: DISCONTINUED | OUTPATIENT
Start: 2018-01-03 | End: 2018-01-03 | Stop reason: HOSPADM

## 2018-01-03 RX ORDER — BISACODYL 5 MG/1
10 TABLET, DELAYED RELEASE ORAL DAILY PRN
Status: DISCONTINUED | OUTPATIENT
Start: 2018-01-03 | End: 2018-01-11 | Stop reason: HOSPADM

## 2018-01-03 RX ORDER — HYDROMORPHONE HYDROCHLORIDE 1 MG/ML
0.5 INJECTION, SOLUTION INTRAMUSCULAR; INTRAVENOUS; SUBCUTANEOUS
Status: DISCONTINUED | OUTPATIENT
Start: 2018-01-03 | End: 2018-01-04 | Stop reason: HOSPADM

## 2018-01-03 RX ORDER — ONDANSETRON 2 MG/ML
4 INJECTION INTRAMUSCULAR; INTRAVENOUS EVERY 6 HOURS PRN
Status: DISCONTINUED | OUTPATIENT
Start: 2018-01-03 | End: 2018-01-11 | Stop reason: HOSPADM

## 2018-01-03 RX ORDER — OXYCODONE AND ACETAMINOPHEN 10; 325 MG/1; MG/1
1 TABLET ORAL ONCE AS NEEDED
Status: DISCONTINUED | OUTPATIENT
Start: 2018-01-03 | End: 2018-01-04 | Stop reason: HOSPADM

## 2018-01-03 RX ORDER — HYDROCODONE BITARTRATE AND ACETAMINOPHEN 10; 325 MG/1; MG/1
1 TABLET ORAL EVERY 4 HOURS PRN
Status: DISCONTINUED | OUTPATIENT
Start: 2018-01-03 | End: 2018-01-11 | Stop reason: HOSPADM

## 2018-01-03 RX ORDER — SODIUM CHLORIDE 0.9 % (FLUSH) 0.9 %
1-10 SYRINGE (ML) INJECTION AS NEEDED
Status: DISCONTINUED | OUTPATIENT
Start: 2018-01-03 | End: 2018-01-03 | Stop reason: HOSPADM

## 2018-01-03 RX ORDER — PROMETHAZINE HYDROCHLORIDE 25 MG/1
25 TABLET ORAL ONCE AS NEEDED
Status: DISCONTINUED | OUTPATIENT
Start: 2018-01-03 | End: 2018-01-04 | Stop reason: HOSPADM

## 2018-01-03 RX ORDER — IPRATROPIUM BROMIDE AND ALBUTEROL SULFATE 2.5; .5 MG/3ML; MG/3ML
3 SOLUTION RESPIRATORY (INHALATION) ONCE AS NEEDED
Status: DISCONTINUED | OUTPATIENT
Start: 2018-01-03 | End: 2018-01-04 | Stop reason: HOSPADM

## 2018-01-03 RX ORDER — HYDRALAZINE HYDROCHLORIDE 20 MG/ML
5 INJECTION INTRAMUSCULAR; INTRAVENOUS
Status: DISCONTINUED | OUTPATIENT
Start: 2018-01-03 | End: 2018-01-04 | Stop reason: HOSPADM

## 2018-01-03 RX ORDER — FENTANYL CITRATE 50 UG/ML
INJECTION, SOLUTION INTRAMUSCULAR; INTRAVENOUS AS NEEDED
Status: DISCONTINUED | OUTPATIENT
Start: 2018-01-03 | End: 2018-01-03 | Stop reason: SURG

## 2018-01-03 RX ORDER — FAMOTIDINE 10 MG/ML
20 INJECTION, SOLUTION INTRAVENOUS ONCE
Status: COMPLETED | OUTPATIENT
Start: 2018-01-03 | End: 2018-01-03

## 2018-01-03 RX ORDER — SODIUM CHLORIDE, SODIUM LACTATE, POTASSIUM CHLORIDE, CALCIUM CHLORIDE 600; 310; 30; 20 MG/100ML; MG/100ML; MG/100ML; MG/100ML
75 INJECTION, SOLUTION INTRAVENOUS CONTINUOUS
Status: DISCONTINUED | OUTPATIENT
Start: 2018-01-03 | End: 2018-01-09

## 2018-01-03 RX ORDER — PROMETHAZINE HYDROCHLORIDE 25 MG/1
25 SUPPOSITORY RECTAL ONCE AS NEEDED
Status: DISCONTINUED | OUTPATIENT
Start: 2018-01-03 | End: 2018-01-04 | Stop reason: HOSPADM

## 2018-01-03 RX ORDER — FLUMAZENIL 0.1 MG/ML
0.2 INJECTION INTRAVENOUS AS NEEDED
Status: DISCONTINUED | OUTPATIENT
Start: 2018-01-03 | End: 2018-01-04 | Stop reason: HOSPADM

## 2018-01-03 RX ORDER — PROMETHAZINE HYDROCHLORIDE 25 MG/ML
12.5 INJECTION, SOLUTION INTRAMUSCULAR; INTRAVENOUS ONCE AS NEEDED
Status: DISCONTINUED | OUTPATIENT
Start: 2018-01-03 | End: 2018-01-04 | Stop reason: HOSPADM

## 2018-01-03 RX ORDER — PROPOFOL 10 MG/ML
VIAL (ML) INTRAVENOUS AS NEEDED
Status: DISCONTINUED | OUTPATIENT
Start: 2018-01-03 | End: 2018-01-03 | Stop reason: SURG

## 2018-01-03 RX ORDER — MIDAZOLAM HYDROCHLORIDE 1 MG/ML
1 INJECTION INTRAMUSCULAR; INTRAVENOUS
Status: DISCONTINUED | OUTPATIENT
Start: 2018-01-03 | End: 2018-01-04 | Stop reason: HOSPADM

## 2018-01-03 RX ORDER — LABETALOL HYDROCHLORIDE 5 MG/ML
5 INJECTION, SOLUTION INTRAVENOUS
Status: DISCONTINUED | OUTPATIENT
Start: 2018-01-03 | End: 2018-01-04 | Stop reason: HOSPADM

## 2018-01-03 RX ORDER — DOCUSATE SODIUM 100 MG/1
100 CAPSULE, LIQUID FILLED ORAL 2 TIMES DAILY PRN
Status: DISCONTINUED | OUTPATIENT
Start: 2018-01-03 | End: 2018-01-11 | Stop reason: HOSPADM

## 2018-01-03 RX ORDER — BACITRACIN, NEOMYCIN, POLYMYXIN B 400; 3.5; 5 [USP'U]/G; MG/G; [USP'U]/G
OINTMENT TOPICAL AS NEEDED
Status: DISCONTINUED | OUTPATIENT
Start: 2018-01-03 | End: 2018-01-03 | Stop reason: HOSPADM

## 2018-01-03 RX ORDER — HYDROMORPHONE HCL 110MG/55ML
PATIENT CONTROLLED ANALGESIA SYRINGE INTRAVENOUS AS NEEDED
Status: DISCONTINUED | OUTPATIENT
Start: 2018-01-03 | End: 2018-01-03 | Stop reason: SURG

## 2018-01-03 RX ORDER — ONDANSETRON 4 MG/1
4 TABLET, ORALLY DISINTEGRATING ORAL EVERY 6 HOURS PRN
Status: DISCONTINUED | OUTPATIENT
Start: 2018-01-03 | End: 2018-01-11 | Stop reason: HOSPADM

## 2018-01-03 RX ADMIN — Medication 2 MG: at 13:14

## 2018-01-03 RX ADMIN — OXYCODONE HYDROCHLORIDE 15 MG: 15 TABLET ORAL at 04:34

## 2018-01-03 RX ADMIN — SUGAMMADEX 400 MG: 100 INJECTION, SOLUTION INTRAVENOUS at 19:50

## 2018-01-03 RX ADMIN — OXYCODONE HYDROCHLORIDE 15 MG: 15 TABLET ORAL at 23:35

## 2018-01-03 RX ADMIN — ATORVASTATIN CALCIUM 40 MG: 20 TABLET, FILM COATED ORAL at 09:58

## 2018-01-03 RX ADMIN — HYDROMORPHONE HYDROCHLORIDE 0.5 MG: 2 INJECTION INTRAMUSCULAR; INTRAVENOUS; SUBCUTANEOUS at 19:13

## 2018-01-03 RX ADMIN — SODIUM CHLORIDE 4 MILLION UNITS: 0.9 INJECTION INTRAVENOUS at 22:08

## 2018-01-03 RX ADMIN — FENTANYL CITRATE 50 MCG: 50 INJECTION, SOLUTION INTRAMUSCULAR; INTRAVENOUS at 17:15

## 2018-01-03 RX ADMIN — FENTANYL CITRATE 50 MCG: 50 INJECTION, SOLUTION INTRAMUSCULAR; INTRAVENOUS at 17:53

## 2018-01-03 RX ADMIN — IPRATROPIUM BROMIDE AND ALBUTEROL SULFATE 3 ML: .5; 3 SOLUTION RESPIRATORY (INHALATION) at 07:55

## 2018-01-03 RX ADMIN — ALBUMIN (HUMAN): 0.05 SOLUTION INTRAVENOUS at 15:30

## 2018-01-03 RX ADMIN — FENTANYL CITRATE 50 MCG: 50 INJECTION, SOLUTION INTRAMUSCULAR; INTRAVENOUS at 16:17

## 2018-01-03 RX ADMIN — VANCOMYCIN HYDROCHLORIDE 1750 MG: 10 INJECTION, POWDER, LYOPHILIZED, FOR SOLUTION INTRAVENOUS at 15:03

## 2018-01-03 RX ADMIN — FENTANYL CITRATE 50 MCG: 50 INJECTION, SOLUTION INTRAMUSCULAR; INTRAVENOUS at 13:13

## 2018-01-03 RX ADMIN — METOPROLOL TARTRATE 50 MG: 50 TABLET, FILM COATED ORAL at 09:58

## 2018-01-03 RX ADMIN — FENTANYL CITRATE 50 MCG: 50 INJECTION, SOLUTION INTRAMUSCULAR; INTRAVENOUS at 13:10

## 2018-01-03 RX ADMIN — FENTANYL CITRATE 50 MCG: 50 INJECTION, SOLUTION INTRAMUSCULAR; INTRAVENOUS at 20:22

## 2018-01-03 RX ADMIN — FAMOTIDINE 20 MG: 10 INJECTION, SOLUTION INTRAVENOUS at 13:08

## 2018-01-03 RX ADMIN — INSULIN ASPART 2 UNITS: 100 INJECTION, SOLUTION INTRAVENOUS; SUBCUTANEOUS at 21:31

## 2018-01-03 RX ADMIN — MORPHINE SULFATE 30 MG: 30 TABLET, EXTENDED RELEASE ORAL at 09:57

## 2018-01-03 RX ADMIN — MORPHINE SULFATE 30 MG: 30 TABLET, EXTENDED RELEASE ORAL at 22:22

## 2018-01-03 RX ADMIN — ALBUMIN (HUMAN): 0.05 SOLUTION INTRAVENOUS at 17:01

## 2018-01-03 RX ADMIN — OXYCODONE HYDROCHLORIDE 15 MG: 15 TABLET ORAL at 11:35

## 2018-01-03 RX ADMIN — SODIUM CHLORIDE, POTASSIUM CHLORIDE, SODIUM LACTATE AND CALCIUM CHLORIDE: 600; 310; 30; 20 INJECTION, SOLUTION INTRAVENOUS at 19:45

## 2018-01-03 RX ADMIN — SODIUM CHLORIDE 4 MILLION UNITS: 0.9 INJECTION INTRAVENOUS at 11:35

## 2018-01-03 RX ADMIN — FENTANYL CITRATE 50 MCG: 50 INJECTION, SOLUTION INTRAMUSCULAR; INTRAVENOUS at 13:08

## 2018-01-03 RX ADMIN — HYDROMORPHONE HYDROCHLORIDE 0.5 MG: 2 INJECTION INTRAMUSCULAR; INTRAVENOUS; SUBCUTANEOUS at 18:13

## 2018-01-03 RX ADMIN — ROCURONIUM BROMIDE 50 MG: 10 INJECTION INTRAVENOUS at 15:28

## 2018-01-03 RX ADMIN — SODIUM CHLORIDE 4 MILLION UNITS: 0.9 INJECTION INTRAVENOUS at 15:01

## 2018-01-03 RX ADMIN — METOPROLOL TARTRATE 50 MG: 50 TABLET, FILM COATED ORAL at 01:16

## 2018-01-03 RX ADMIN — LIDOCAINE HYDROCHLORIDE 60 MG: 20 INJECTION, SOLUTION INFILTRATION; PERINEURAL at 15:28

## 2018-01-03 RX ADMIN — DEXMEDETOMIDINE HYDROCHLORIDE 12 MCG/HR: 100 INJECTION, SOLUTION, CONCENTRATE INTRAVENOUS at 16:00

## 2018-01-03 RX ADMIN — SODIUM CHLORIDE, POTASSIUM CHLORIDE, SODIUM LACTATE AND CALCIUM CHLORIDE 9 ML/HR: 600; 310; 30; 20 INJECTION, SOLUTION INTRAVENOUS at 13:08

## 2018-01-03 RX ADMIN — Medication 2 MG: at 13:08

## 2018-01-03 RX ADMIN — SODIUM CHLORIDE, POTASSIUM CHLORIDE, SODIUM LACTATE AND CALCIUM CHLORIDE: 600; 310; 30; 20 INJECTION, SOLUTION INTRAVENOUS at 17:31

## 2018-01-03 RX ADMIN — Medication 1 MG: at 13:27

## 2018-01-03 RX ADMIN — PROPOFOL 150 MG: 10 INJECTION, EMULSION INTRAVENOUS at 15:28

## 2018-01-03 RX ADMIN — FENTANYL CITRATE 50 MCG: 50 INJECTION, SOLUTION INTRAMUSCULAR; INTRAVENOUS at 20:32

## 2018-01-03 RX ADMIN — SODIUM CHLORIDE 4 MILLION UNITS: 0.9 INJECTION INTRAVENOUS at 03:13

## 2018-01-03 RX ADMIN — FENTANYL CITRATE 100 MCG: 50 INJECTION, SOLUTION INTRAMUSCULAR; INTRAVENOUS at 15:28

## 2018-01-03 RX ADMIN — HYDROMORPHONE HYDROCHLORIDE 0.5 MG: 1 INJECTION, SOLUTION INTRAMUSCULAR; INTRAVENOUS; SUBCUTANEOUS at 20:48

## 2018-01-03 RX ADMIN — IPRATROPIUM BROMIDE AND ALBUTEROL SULFATE 3 ML: .5; 3 SOLUTION RESPIRATORY (INHALATION) at 12:25

## 2018-01-03 RX ADMIN — SODIUM CHLORIDE 4 MILLION UNITS: 0.9 INJECTION INTRAVENOUS at 06:33

## 2018-01-03 NOTE — PROGRESS NOTES
Chacho Kam MD                          [phone]      Patient ID:    Name:  Sam Barrett    MRN:  [id number]    1960   57 y.o.  male            Patient Care Team:  Casey Verduzco Jr., MD as PCP - General (Family Medicine)    LOS: 10  INTERVAL:  Follow up sepsis, group B strep septicemia, epidural abscess, infection of cephalic vein, IVDU, New onset DM, HCV  Chart reviewed, EMAR reviewed.   No acute events  Continues to feel better every day  BLE with full movement, increased in RUE.  Still with pain in back but about the same.    Objective     Vital Signs past 24hrs    BP range: BP: (106-118)/(65-79) 106/65  Pulse range: Heart Rate:  [89-99] 99  Resp rate range: Resp:  [16-18] 18  Temp range: Temp (24hrs), Av.3 °F (36.8 °C), Min:98.1 °F (36.7 °C), Max:98.4 °F (36.9 °C)    O2 Device: room air       119 kg (263 lb 3.2 oz); Body mass index is 31.2 kg/(m^2).    Intake/Output Summary (Last 24 hours) at 18  Last data filed at 18 1832   Gross per 24 hour   Intake                0 ml   Output             2600 ml   Net            -2600 ml     Exam:  GENERAL:  NAD, Aox3  HEENT:  EOMI, nonicteric sclera, no JVD  PULMONARY:    No resp distress CTAB, no wheeze, no crackles, no rhonchi, symmetric air entry  CARDIAC:  RRR no MRG, S1 S2  ABD: SNTND BS+  EXT: mild edematous right le warm 1+ pitting edema, left lower extremity with distal pulses intact  and RUE with good rom, minimal edema strength 5/5 symmetric today, pulses symmetric 2+   NEURO:  CNs II-XII intact, no focal deficits  SKIN: as above  PSYCH: appropriate mood    Scheduled meds:      atorvastatin 40 mg Oral Daily   enoxaparin 40 mg Subcutaneous Q24H   glipiZIDE 10 mg Oral QAM AC   insulin aspart 2-5 Units Subcutaneous 4x Daily AC & at Bedtime   ipratropium-albuterol 3 mL Nebulization 4x Daily - RT   metFORMIN 500 mg Oral BID With Meals   metoprolol tartrate 50 mg Oral Q8H   Morphine 30 mg Oral Q12H   penicillin g  (potassium) 4 Million Units Intravenous Q4H                      Data Review:      Results from last 7 days  Lab Units 01/02/18  0727 01/01/18  0821 12/31/17  0604 12/30/17  0536 12/29/17  1959 12/29/17  0612 12/28/17  0758 12/27/17  0515   SODIUM mmol/L  --   --   --  139 139  --  143 150*   POTASSIUM mmol/L  --   --   --  4.0 4.2  --  3.7 3.3*   CHLORIDE mmol/L  --   --   --  103 103  --  110* 115*   CO2 mmol/L  --   --   --  26.1 24.5  --  23.4 23.4   BUN mg/dL  --   --   --  15 16  --  15 20   CREATININE mg/dL  --   --   --  0.45* 0.54* 0.46* 0.50* 0.66*   CALCIUM mg/dL  --   --   --  8.3* 8.4*  --  8.3* 8.1*   BILIRUBIN mg/dL  --   --   --   --  0.8  --  0.7 0.6   ALK PHOS U/L  --   --   --   --  75  --  73 75   ALT (SGPT) U/L  --   --   --   --  41  --  40 43*   AST (SGOT) U/L  --   --   --   --  29  --  33 31   GLUCOSE mg/dL  --   --   --  132* 145*  --  129* 148*   WBC 10*3/mm3 8.47 9.60 11.49* 10.34  --  10.04  --  8.36   HEMOGLOBIN g/dL 11.1* 11.4* 11.4* 12.0*  --  12.2*  --  12.1*   PLATELETS 10*3/mm3 327 331 310 264  --  225  --  243   INR   --   --   --  1.25*  --   --   --   --    PROCALCITONIN ng/mL  --   --   --  0.29*  --   --   --   --        Lab Results   Component Value Date    CALCIUM 8.3 (L) 12/30/2017             Results Review:    I have reviewed the available laboratory results and reviewed the chest imaging personally    ASSESSMENT:   Acute hypoxic respiratory failure resolved   Strep septicemia/bacteremia   Right Cephalic vein Pylephlebitis  History of IV drug abuse   Cellulitis lower extremity   New onset diabetes mellitus  Chronic narcotic dependence/ abuse  Intermittent SVT   Hepatitis C  Epidural abscess    PLAN:  cotniuepain medications - does have legitimate source of pain.  Epidural abscess enlarged from admission scan - Plans follow-up with neurosurgery in the afternoon   Antibiotics managed by infectious diseases. - appreciate assistance  Insulin per endocrinology  Access center  seen    Dispo pending NSG intervention and recovering    DVT ppx   Full Code     Chacho Kam MD  1/2/2018

## 2018-01-03 NOTE — ANESTHESIA PROCEDURE NOTES
Arterial Line    Patient location during procedure: pre-op  Start time: 1/3/2018 1:20 PM  Stop Time:1/3/2018 1:23 PM       Line placed for hemodynamic monitoring.  Performed By   Anesthesiologist: GLENIS BERRIOS  Preanesthetic Checklist  Completed: patient identified, site marked, surgical consent, pre-op evaluation, timeout performed, IV checked, risks and benefits discussed and monitors and equipment checked  Arterial Line Prep   Sterile Tech: mask  Prep: alcohol swabs  Patient monitoring: blood pressure monitoring, continuous pulse oximetry and EKG  Arterial Line Procedure   Laterality:left  Location:  radial artery  Catheter size: 20 G   Guidance: ultrasound guided and palpation technique  Number of attempts: 2  Successful placement: yes

## 2018-01-03 NOTE — INTERVAL H&P NOTE
H&P updated. The patient was examined and the following changes are noted:  Despite antibiotics the patient developed epidural abcesses in the thoracic and lumbo-sacal spine.    The risks, benefits, and alternatives of lumbar decompression  were explained in detail to the patient. The alternative is not to perform an operative procedure. The benefit should be, though is not guaranteed, primarily a potential reduction in the neurosensory and/or motor dysfunction; secondarily, a possible reduction in back pain. The risks include, but are not limited to, the possibility of death, infection, stroke, bleeding, blindness, paralysis, blindness, lack of improvement in the patient's pain syndrome, worsening of the pain syndrome, meningitis, osteomyelitis, recurrent disc herniation or stenosis, need for further operative intervention, recurrence of the pain syndrome, sexual dysfunction, incontinence, worsening of inability to urinate without catheterization, inability to have a normal bowel movement, epidural scarring resulting in chronic back pain, leakage of cerebral spinal fluid at the time of surgery or after recovery from surgery requiring further operative intervention,spinal instability. I also explained the realistic expectations as they pertain to the procedure. The patient voiced understanding of these risks, benefits, alternatives, and realistic expectations and requests that we proceed with the operative intervention.

## 2018-01-03 NOTE — PLAN OF CARE
Problem: Patient Care Overview (Adult)  Goal: Plan of Care Review  Outcome: Ongoing (interventions implemented as appropriate)   01/03/18 0429   Coping/Psychosocial Response Interventions   Plan Of Care Reviewed With patient   Patient Care Overview   Progress no change   Outcome Evaluation   Outcome Summary/Follow up Plan Pt still c/o excessive amounts of pain. Surgery for lumbar decompression sched at 2 pm today. IV antibiotics continued. Will continue to monitor closely       Problem: Fall Risk (Adult)  Goal: Absence of Falls  Outcome: Ongoing (interventions implemented as appropriate)      Problem: Pressure Ulcer Risk (Antonio Scale) (Adult,Obstetrics,Pediatric)  Goal: Skin Integrity  Outcome: Ongoing (interventions implemented as appropriate)      Problem: Respiratory Insufficiency (Adult)  Goal: Effective Ventilation  Outcome: Ongoing (interventions implemented as appropriate)

## 2018-01-03 NOTE — PROGRESS NOTES
" LOS: 11 days     Chief Complaint:  Follow-up GBS septicemia, T&L spine abscess    Interval History: NO fevers. Going to OR tday for washout. TOlerating PCN G without rash or diarrhea. WBC normal. RUE with sharp pain worse w/ palpation. Slightly worse swelling today. Will ask vascular to see.    ROS: no n/v/d    Vital Signs  Temp:  [98 °F (36.7 °C)-98.8 °F (37.1 °C)] 98 °F (36.7 °C)  Heart Rate:  [89-99] 93  Resp:  [18-20] 18  BP: (106-133)/(65-84) 112/70    Physical Exam:  /70 (BP Location: Left arm, Patient Position: Lying)  Pulse 93  Temp 98 °F (36.7 °C) (Oral)   Resp 18  Ht 195.6 cm (77.01\")  Wt 119 kg (261 lb 11.2 oz)  SpO2 92%  BMI 31.03 kg/m2  Body mass index is 31.03 kg/(m^2).    General: awake  Head: Normocephalic, no trauma  Eyes: no scleral icterus, no conjunctival pallor, no conjunctival hemorrhages.   ENT: MM dry, OP clear, no thrush. Fair dentition.   Neck: Supple  Cardiovascular: NR, RR, no murmurs, rubs, or gallops; + RLE edema  Respiratory: lungs are clear;  no wheezing on ambient air  GI: Abdomen is soft, non-tender, non-distended  Musculoskeletal: no joint abnormalities, normal musculature  Skin: RUE pain and swelling are worse, moderate TTP  Neurological: Alert and oriented x 3, 5/5 strength in LEs; 5/5 strength in UEs  Psychiatric: Normal mood and affect   Vasc: no cyanosis    Antibiotics:  PCN G 4 million units q4h    LABS:  CBC, BMP reviewed today  Lab Results   Component Value Date    WBC 9.35 01/03/2018    HGB 11.5 (L) 01/03/2018    HCT 37.6 (L) 01/03/2018    MCV 96.2 01/03/2018     01/03/2018     Lab Results   Component Value Date    GLUCOSE 151 (H) 01/03/2018    BUN 14 01/03/2018    CREATININE 0.58 (L) 01/03/2018    EGFRIFNONA 144 01/03/2018    BCR 24.1 01/03/2018    K 4.0 01/03/2018    CO2 26.6 01/03/2018    CALCIUM 8.8 01/03/2018    ALBUMIN 2.50 (L) 01/03/2018    LABIL2 0.6 01/03/2018    AST 37 01/03/2018    ALT 42 (H) 01/03/2018     Lab Results   Component Value Date "    CRP 5.55 (H) 12/29/2017     Lab Results   Component Value Date    HGBA1C 11.44 (H) 12/23/2017     Hep C Ab positive  HCV RNA 6.2 million and genotype 2b    Microbiology:  TOMMY John BCX 12/22: Group B Strep in 2/2 sets (sensitive to PCN and ceftriaxone)  BCx: 12/23 negative    Radiology (personally reviewed):   No new imaging today    Assessment/Plan   1. Group B Strep septicemia secondary to IV drug use  -continue PCN G 4 million units q4h   -repeat blood cultures negative to date  -ARUNA negative  -repeat CBC in AM    2. Lumbar and thoracic spine epidural abscesses  - operative debridement today; please send operative cultures  - Continue antibiotic as per #1 above; will plan 6 weeks of antibiotics after I&D     3. History of IV drug abuse  -HIV negative; UDS with opiates     4. Right upper and lower extremity erythema and edema  -cephalic vein infection per CT; no abscess  -worsening swelling and pain today; will consult vascular surgery    5. Uncontrolled DM2 in obese     6. Chronic Hep C   -HCV RNA 6.2 million and genotype 2b  -will address treatment as outpatient    ID will follow.

## 2018-01-03 NOTE — CONSULTS
Name: Sam Barrett ADMIT: 2017   : 1960  PCP: Casey Verduzco Jr., MD    MRN: [id number] LOS: 11 days   AGE/SEX: 57 y.o. male  ROOM: [facility]/     Consults  Baldemar Fuller MD     LOS: 11 days   Patient Care Team:  Casey Verduzco Jr., MD as PCP - General (Family Medicine)    Subjective     History of Present Illness  57 y.o. male admitted to the hospital after being transferred from [facility] with sepsis and epidural abscess.  He's been followed by the neurosurgical service and is actually on your scheduled today for drainage and treatment of his epidural abscess.  Since his hospitalization he's had right upper extremity pain and swelling associated with his IV drug abuse sites.  A CT scan done on  showed some air along the cephalic vein course.  He's been treated with aggressive IV antibiotics per cultures.  There was some concern that it may have been worse today and we were asked to see him.  Of note, he did have an upper extremity venous duplex scan that was negative for superficial thrombophlebitis.    HPI Elements: Patient complains of right arm pain. Problem is located mainly in the right upper arm and chest. The pain is described as aching, and is 6/10 in intensity. Pain is intermittent. Onset was 1 week ago. Symptoms have been gradually improving since.  The patient's risks factors for peripheral vascular disease include diabetes mellitus.  The patient denies a history of peripheral vascular disease    Review of Systems   Constitutional: Positive for fatigue and fever (improved).   HENT: Positive for dental problem.    Eyes: Negative.    Respiratory: Negative.    Cardiovascular: Positive for leg swelling (improved).   Musculoskeletal: Positive for back pain and gait problem.   Skin: Positive for rash. Negative for wound.   Hematological: Bruises/bleeds easily.   All other systems reviewed and are negative.      History reviewed. No pertinent past medical history.    History  reviewed. No pertinent surgical history.    History reviewed. No pertinent family history.    Social History   Substance Use Topics   • Smoking status: Current Every Day Smoker     Packs/day: 1.00     Years: 20.00     Types: Cigarettes   • Smokeless tobacco: Never Used   • Alcohol use No       Allergies: Review of patient's allergies indicates no known allergies.    Prescriptions Prior to Admission   Medication Sig Dispense Refill Last Dose   • aspirin 81 MG EC tablet Take 81 mg by mouth Daily.      • atorvastatin (LIPITOR) 40 MG tablet Take 40 mg by mouth Daily.      • baclofen (LIORESAL) 10 MG tablet Take 10 mg by mouth Daily As Needed for Muscle Spasms (Patient takes only occasionally because it makes him so drowsy).      • gabapentin (NEURONTIN) 300 MG capsule Take 300 mg by mouth 4 (Four) Times a Day.      • hydrochlorothiazide (HYDRODIURIL) 12.5 MG tablet Take 12.5 mg by mouth Daily.      • lisinopril (PRINIVIL,ZESTRIL) 5 MG tablet Take 5 mg by mouth Daily.      • meloxicam (MOBIC) 15 MG tablet Take 25 mg by mouth 2 (Two) Times a Day.      • Morphine (MS CONTIN) 15 MG 12 hr tablet Take 15 mg by mouth 2 (Two) Times a Day.      • oxyCODONE (ROXICODONE) 15 MG immediate release tablet Take 15 mg by mouth Every 6 (Six) Hours As Needed for Moderate Pain .          atorvastatin 40 mg Oral Daily   enoxaparin 40 mg Subcutaneous Q24H   glipiZIDE 10 mg Oral QAM AC   insulin aspart 2-5 Units Subcutaneous 4x Daily AC & at Bedtime   ipratropium-albuterol 3 mL Nebulization 4x Daily - RT   metFORMIN 500 mg Oral BID With Meals   metoprolol tartrate 50 mg Oral Q8H   Morphine 30 mg Oral Q12H   penicillin g (potassium) 4 Million Units Intravenous Q4H        •  acetaminophen **OR** acetaminophen  •  dextrose  •  dextrose  •  glucagon (human recombinant)  •  influenza vaccine  •  magnesium sulfate **OR** magnesium sulfate in D5W 1g/100mL (PREMIX)  •  ondansetron  •  oxyCODONE  •  potassium chloride **OR** potassium chloride **OR**  potassium chloride  •  sodium chloride      Objective   Temp:  [98 °F (36.7 °C)-98.8 °F (37.1 °C)] 98 °F (36.7 °C)  Heart Rate:  [89-99] 93  Resp:  [18-20] 18  BP: (106-133)/(65-84) 112/70    I/O this shift:  In: 100 [IV Piggyback:100]  Out: 475 [Urine:475]    Physical Exam   Constitutional: He is oriented to person, place, and time. He appears well-developed and well-nourished.   HENT:   Head: Normocephalic and atraumatic.   Eyes: EOM are normal.   Neck: Normal range of motion. Neck supple.   Cardiovascular: Normal rate and regular rhythm.    Pulmonary/Chest: Effort normal and breath sounds normal.   Abdominal: Soft. Bowel sounds are normal.   Musculoskeletal: He exhibits edema (right lateral dorsum of foot).   Neurological: He is alert and oriented to person, place, and time.   Skin: Rash (Please see photograph.  He has track marks in the right before meals.  Erythema in the right upper arm.  Tenderness in the right deltopectoral groove but no erythema or cord felt.) noted.   Psychiatric: He has a normal mood and affect. His behavior is normal.             Results from last 7 days  Lab Units 01/03/18  0608 01/02/18  0727 01/01/18  0821 12/31/17  0604 12/30/17  0536 12/29/17  0612   WBC 10*3/mm3 9.35 8.47 9.60 11.49* 10.34 10.04   HEMOGLOBIN g/dL 11.5* 11.1* 11.4* 11.4* 12.0* 12.2*   PLATELETS 10*3/mm3 343 327 331 310 264 225     Results from last 7 days  Lab Units 01/03/18  0608 12/30/17  0536 12/29/17  1959 12/29/17  0612 12/28/17  0758   SODIUM mmol/L 134* 139 139  --  143   POTASSIUM mmol/L 4.0 4.0 4.2  --  3.7   CHLORIDE mmol/L 98 103 103  --  110*   CO2 mmol/L 26.6 26.1 24.5  --  23.4   BUN mg/dL 14 15 16  --  15   CREATININE mg/dL 0.58* 0.45* 0.54* 0.46* 0.50*   GLUCOSE mg/dL 151* 132* 145*  --  129*   Estimated Creatinine Clearance: 200.7 mL/min (by C-G formula based on Cr of 0.58).  Results from last 7 days  Lab Units 12/30/17  0536   PROTIME Seconds 15.3*   INR  1.25*       Imaging Studies:  12/26/2017  CT  IMPRESSION:  String of small bubbles are present along the right  deltopectoral groove adjacent to the cephalic vein consistent with  extension of infection along the cephalic vein and phlebitis. There is  focal edema and inflammation deep and medial to the cephalic vein at the  level of the proximal humeral shaft potentially with phlegmon formation  without peripheral enhancing or easily drainable collection. There is  more generalized cellulitis circumferentially involving the distal arm,  elbow, forearm    MARCELINO and upper and lower venous scans reviewed    12/31/2017 MRI  IMPRESSION:  Epidural abscess extends from the mid T10 to the mid T12  level along the posterior aspect of the spinal canal, extending  laterally into the left lateral recess at the T10-11 and T11-12 levels.  There appears to be involvement of the left T10-11 and T11-12 facet  joints and the left T11/11th rib costovertebral joint. Marrow signal  abnormality in the posterior half of the left T11 pedicle and pars  interarticularis is suspicious for osteomyelitis.        Active Hospital Problems (** Indicates Principal Problem)    Diagnosis Date Noted   • **Sepsis [A41.9] 12/23/2017   • Abscess in epidural space of lumbar spine [G06.1] 01/02/2018   • Abscess in epidural space of thoracic spine [G06.1] 01/02/2018   • Abnormal MRI, lumbar spine [R93.7] 12/29/2017   • Diabetes [E11.9] 12/29/2017      Resolved Hospital Problems    Diagnosis Date Noted Date Resolved   No resolved problems to display.     Problem Points:  1:  Patient has a self-limited or minor (max of 2)  1:  Patient has an established problem, stable or improving  2:  Patient has an established problem, worsening  Total problem points:4 or more    Data Points:  1:  I have reviewed or order clinical lab test  2:  I have personally and independently review of image, tracing, or specimen  2:  I have reviewed and summation of old records and/or discussed the patients care with another  health care provider  Total data points:4 or more    Risk Points:  Moderate:  Acute compliated injury    MDM Prob point Data point Risk   SF 1 1 Minimal   Low 2 2 Low   Mod 3 3 Moderate   High 4 4 High     Code MDM History Exam Time   08884 SF/Low Detailed Detailed 30   35823 Mod Comprehensive Comprehensive 50   10195 High Comprehensive Comprehensive 70     Detailed history:  4 elements HPI or status of 3 chronic problems; 2-9 system ROS  Comprehensive:  4 elements HPI or status of 3 chronic problems;  10 system ROS    Detailed Exam:  12 findings from any organ system  Comprehensive Exam:  2 findings from each of 9 systems.     [place]    Assessment/Plan     Principal Problem:    Sepsis  Active Problems:    Abnormal MRI, lumbar spine    Diabetes    Abscess in epidural space of lumbar spine    Abscess in epidural space of thoracic spine        57 y.o. male admitted to the hospital with group B strep septicemia secondary to IV drug use.  He is currently moving to penicillin.  Additionally, he has poorly controlled diabetes with a hemoglobin A1c of 11.  The original infection has also calls lumbar and thoracic spine epidural abscess.  He is to go for operative debridement today.  Current plan is 6 weeks of antibiotics after the incision and drainage.    On exam the patient says he injects at the antecubital fossa and you can see this insertion site for the needles.  There is no fluctuance or tenderness at this part.  However, in the mid upper arm there is an area that is red with some blanching erythema that is tender to the touch.  Again, no obvious fluctuance.  While the deltopectoral groove looks okay to the eye is tender.  No erythema is noted here.  Review the CT scan suggests that this is where a couple air bubbles were seen around the vein.  Interestingly, his upper extremity venous duplex scan was negative for superficial thrombophlebitis.  This is quite surprising to me.  However, probably a good thing  if that still true.    Right now, he does not seem to have any sort of drainable abscess.  I do think it may be worthwhile to re-ultrasound the arm.  He may even need additional anatomic imaging.  If he continues not to improve then I would recommend opening up the upper arm area debriding the cephalic vein and leaving open for wound care but would not do this in the absence of an abscess.    Additionally, he has a similar area on the lateral aspect of the right foot.  This looks like a strep cellulitis but no blisters or ulcers.  No fluctuance.  Nothing that looks surgically drainable.  If this continues to coalesce or becomes worse could consider further imaging or debridement at that time.    I discussed the patients findings and my recommendations with patient and nursing staff.  Please call my office with any question: [phone]    Baldemar Fuller MD  01/03/18  12:24 PM

## 2018-01-03 NOTE — H&P (VIEW-ONLY)
"Patient name: Sam Barrett  Referring Provider: [place] M.D.  Reason for Consultation: Possible epidural abscess    Patient Care Team:  Casey Verduzco Jr., MD as PCP - General (Family Medicine)    Chief complaint: Enhancement of the epidural space according to the radiology read, IV drug abuse, sepsis    Subjective .     History of present illness:    Patient is a 57 y.o. right handed male who presents with cellulitis, increased lactate, increased white blood cell count, tachycardia, chronic back pain status post epidural injections last one about a week ago.    Apparently because of the evidence of injection sites of the left arm and his clinical status and MRI scan was obtained lumbar spine that demonstrated diffuse enhancement of the as well as in the paraspinal region.  The emergency room physician and apparently is a neurologist at [facility] felt uncomfortable admitting the patient there since they had no in-house neurosurgical coverage and therefore they requested transfer of the patient to [facility].    The patient indicates that he has been having increasing back pain.  This is been worsening over the last several weeks.  He has noted swelling in his right arm and leg really can't tell me how long that might be going on.  He notes that moving his legs causes pain in the pain is much more intense in the low back.  He denies any pain of this intensity in the past.    He continues to smoke.  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  He denies drinking alcohol.    He denies a history of deep venous thrombosis, cardiac infections, skin infections.  He denies any kidney problems dysuria or hematuria.  He states that he did have an episode of incontinence.  He simply could not get up off the couch but he did a year to urinate but he didn't have pain on the " couch.    Review of Systems  Review of Systems   Constitutional: Positive for activity change, appetite change and chills. Negative for unexpected weight change.   HENT: Negative for congestion.    Eyes: Negative for discharge.   Respiratory: Negative for chest tightness.    Cardiovascular: Negative for chest pain.   Gastrointestinal: Negative for abdominal distention.   Endocrine: Negative for cold intolerance.   Genitourinary: Negative for difficulty urinating and dysuria.   Musculoskeletal: Positive for arthralgias, back pain, gait problem and joint swelling. Negative for neck pain and neck stiffness.   Skin: Positive for color change.   Allergic/Immunologic: Positive for food allergies.   Neurological: Positive for headaches. Negative for dizziness, tremors, seizures, syncope, facial asymmetry, speech difficulty, light-headedness and numbness.   Hematological: Negative for adenopathy. Does not bruise/bleed easily.   Psychiatric/Behavioral: Negative for confusion and decreased concentration.       History  PAST MEDICAL HISTORY  No past medical history on file.    PAST SURGICAL HISTORY  No past surgical history on file.    FAMILY HISTORY  No family history on file.    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    MEDICATIONS:  Prescriptions Prior to Admission   Medication Sig Dispense Refill Last Dose   • gabapentin (NEURONTIN) 300 MG capsule Take 300 mg by mouth 4 (Four) Times a Day.      • hydrochlorothiazide (HYDRODIURIL) 12.5 MG tablet Take 12.5 mg by mouth Daily.      • Morphine (MS CONTIN) 15 MG 12 hr tablet Take 15 mg by mouth 2 (Two) Times a Day.      • oxyCODONE (ROXICODONE) 15 MG immediate release tablet Take 15 mg by mouth Every 6 (Six) Hours As Needed for Moderate Pain .           Objective     Results Review:  LABS:    Admission on 12/23/2017   Component Date Value Ref Range Status   • Glucose 12/23/2017 342* 70 - 130 mg/dL Final   • Glucose 12/23/2017 357* 65 - 99 mg/dL Final   • BUN 12/23/2017 28* 6 - 20 mg/dL Final   • Creatinine 12/23/2017 0.78  0.76 - 1.27 mg/dL Final   • Sodium 12/23/2017 130* 136 - 145 mmol/L Final   • Potassium 12/23/2017 3.4* 3.5 - 5.2 mmol/L Final   • Chloride 12/23/2017 89* 98 - 107 mmol/L Final   • CO2 12/23/2017 22.3  22.0 - 29.0 mmol/L Final   • Calcium 12/23/2017 9.1  8.6 - 10.5 mg/dL Final   • Total Protein 12/23/2017 6.7  6.0 - 8.5 g/dL Final   • Albumin 12/23/2017 2.40* 3.50 - 5.20 g/dL Final   • ALT (SGPT) 12/23/2017 20  1 - 41 U/L Final   • AST (SGOT) 12/23/2017 23  1 - 40 U/L Final   • Alkaline Phosphatase 12/23/2017 74  39 - 117 U/L Final   • Total Bilirubin 12/23/2017 1.0  0.1 - 1.2 mg/dL Final   • eGFR Non  Amer 12/23/2017 103  >60 mL/min/1.73 Final   • Globulin 12/23/2017 4.3  gm/dL Final   • A/G Ratio 12/23/2017 0.6  g/dL Final   • BUN/Creatinine Ratio 12/23/2017 35.9* 7.0 - 25.0 Final   • Anion Gap 12/23/2017 18.7  mmol/L Final   • WBC 12/23/2017 10.75* 4.50 - 10.70 10*3/mm3 Final   • RBC 12/23/2017 4.86  4.60 - 6.00 10*6/mm3 Final   • Hemoglobin 12/23/2017 14.7  13.7 - 17.6 g/dL Final   • Hematocrit 12/23/2017 44.0  40.4 - 52.2 % Final   • MCV 12/23/2017 90.5  79.8 - 96.2 fL Final   • MCH 12/23/2017 30.2  27.0 - 32.7 pg Final   • MCHC 12/23/2017 33.4  32.6 - 36.4 g/dL Final   • RDW 12/23/2017 13.9  11.5 - 14.5 % Final   • RDW-SD 12/23/2017 45.3  37.0 - 54.0 fl Final   • MPV 12/23/2017 12.6* 6.0 - 12.0 fL Final   • Platelets 12/23/2017 318  140 - 500 10*3/mm3 Final   • Ionized Calcium 12/23/2017 1.26  1.15 - 1.35 mmol/L Final   • Ionized Calcium 12/23/2017 5.0  4.6 - 5.4 mg/dL Final   • Hemoglobin A1C 12/23/2017 11.44* 4.80 - 5.60 % Final   • Lactate 12/23/2017 3.7* 0.5 - 2.0 mmol/L Final   • Protime 12/23/2017 15.8* 11.7 - 14.2  Seconds Final   • INR 12/23/2017 1.31* 0.90 - 1.10 Final   • Right Common Femoral Spont 12/23/2017 Y   Final   • Right Common Femoral Phasic 12/23/2017 Y   Final   • Right Common Femoral Augment 12/23/2017 Y   Final   • Right Common Femoral Competent 12/23/2017 Y   Final   • Right Common Femoral Compress 12/23/2017 C   Final   • Right Saphenofemoral Junction Spont 12/23/2017 Y   Final   • Right Saphenofemoral Junction Phas* 12/23/2017 Y   Final   • Right Saphenofemoral Junction Comp* 12/23/2017 C   Final   • Right Proximal Femoral Compress 12/23/2017 C   Final   • Right Mid Femoral Spont 12/23/2017 Y   Final   • Right Mid Femoral Phasic 12/23/2017 Y   Final   • Right Mid Femoral Augment 12/23/2017 Y   Final   • Right Mid Femoral Competent 12/23/2017 Y   Final   • Right Mid Femoral Compress 12/23/2017 C   Final   • Right Distal Femoral Compress 12/23/2017 C   Final   • Right Popliteal Spont 12/23/2017 Y   Final   • Right Popliteal Phasic 12/23/2017 Y   Final   • Right Popliteal Augment 12/23/2017 Y   Final   • Right Popliteal Competent 12/23/2017 Y   Final   • Right Popliteal Compress 12/23/2017 C   Final   • Right Posterior Tibial Compress 12/23/2017 C   Final   • Right Peroneal Compress 12/23/2017 C   Final   • Right GastronemiusSoleal Compress 12/23/2017 C   Final   • Right Greater Saph AK Compress 12/23/2017 C   Final   • Right Greater Saph BK Compress 12/23/2017 C   Final   • Left Common Femoral Spont 12/23/2017 Y   Final   • Left Common Femoral Phasic 12/23/2017 Y   Final   • Left Common Femoral Augment 12/23/2017 Y   Final   • Left Common Femoral Competent 12/23/2017 Y   Final   • Left Common Femoral Compress 12/23/2017 C   Final   • Left Saphenofemoral Junction Spont 12/23/2017 Y   Final   • Left Saphenofemoral Junction Phasic 12/23/2017 Y   Final   • Left Saphenofemoral Junction Compr* 12/23/2017 C   Final   • Left Proximal Femoral Compress 12/23/2017 C   Final   • Left Mid Femoral Spont 12/23/2017 Y   Final    • Left Mid Femoral Phasic 12/23/2017 Y   Final   • Left Mid Femoral Augment 12/23/2017 Y   Final   • Left Mid Femoral Competent 12/23/2017 Y   Final   • Left Mid Femoral Compress 12/23/2017 C   Final   • Left Distal Femoral Compress 12/23/2017 C   Final   • Left Popliteal Spont 12/23/2017 Y   Final   • Left Popliteal Phasic 12/23/2017 Y   Final   • Left Popliteal Augment 12/23/2017 Y   Final   • Left Popliteal Competent 12/23/2017 Y   Final   • Left Popliteal Compress 12/23/2017 C   Final   • Left Posterior Tibial Compress 12/23/2017 C   Final   • Left Peroneal Compress 12/23/2017 C   Final   • Left GastronemiusSoleal Compress 12/23/2017 C   Final   • Left Greater Saph AK Compress 12/23/2017 C   Final   • Left Greater Saph BK Compress 12/23/2017 C   Final   • Creatine Kinase 12/23/2017 110  20 - 200 U/L Final   • Glucose 12/23/2017 428* 70 - 130 mg/dL Final   • Extra Tube 12/23/2017 Hold for add-ons.   Final    Auto resulted.   • Scan Slide 12/23/2017    Final    See Manual Differential Results   • Neutrophil % 12/23/2017 72.0  42.7 - 76.0 % Final   • Lymphocyte % 12/23/2017 20.0  19.6 - 45.3 % Final    2 plasma cytoid-like lymphocytes   • Monocyte % 12/23/2017 6.0  5.0 - 12.0 % Final   • Atypical Lymphocyte % 12/23/2017 2.0  0.0 - 5.0 % Final   • Neutrophils Absolute 12/23/2017 7.74  1.90 - 8.10 10*3/mm3 Final   • Lymphocytes Absolute 12/23/2017 2.15  0.90 - 4.80 10*3/mm3 Final   • Monocytes Absolute 12/23/2017 0.65  0.20 - 1.20 10*3/mm3 Final   • nRBC 12/23/2017 2.0* 0.0 - 0.0 /100 WBC Final   • RBC Morphology 12/23/2017 Normal  Normal Final   • WBC Morphology 12/23/2017 Normal  Normal Final   • Clumped Platelets 12/23/2017 Present  None Seen Final   • Glucose 12/23/2017 277* 70 - 130 mg/dL Final   • Hepatitis C Ab 12/23/2017 Reactive* Non-Reactive Final   • C-Reactive Protein 12/23/2017 40.59* 0.00 - 0.50 mg/dL Final   • Amphet/Methamphet, Screen 12/23/2017 Negative  Negative Final   • Barbiturates Screen,  Urine 12/23/2017 Negative  Negative Final   • Benzodiazepine Screen, Urine 12/23/2017 Negative  Negative Final   • Cocaine Screen, Urine 12/23/2017 Negative  Negative Final   • Opiate Screen 12/23/2017 Positive* Negative Final   • THC, Screen, Urine 12/23/2017 Negative  Negative Final   • Methadone Screen, Urine 12/23/2017 Negative  Negative Final   • Oxycodone Screen, Urine 12/23/2017 Negative  Negative Final   • HIV-1/ HIV-2 12/23/2017 Non-Reactive  Non-Reactive Final   • HIV-1 P24 Ag Screen 12/23/2017 Non-Reactive  Non-Reactive Final   • Lactate 12/23/2017 2.9* 0.5 - 2.0 mmol/L Final   • Glucose 12/23/2017 248* 70 - 130 mg/dL Final   • Right Internal Jugular Augment 12/23/2017 Y   Final   • Right Internal Jugular Competent 12/23/2017 Y   Final   • Right Internal Jugular Compress 12/23/2017 C   Final   • Right Internal Jugular Phasic 12/23/2017 Y   Final   • Right Internal Jugular Spont 12/23/2017 Y   Final   • Left Internal Jugular Augment 12/23/2017 Y   Final   • Left Internal Jugular Competent 12/23/2017 Y   Final   • Left Internal Jugular Compress 12/23/2017 C   Final   • Left Internal Jugular Phasic 12/23/2017 Y   Final   • Left Internal Jugular Spont 12/23/2017 Y   Final   • Right Subclavian Augment 12/23/2017 Y   Final   • Right Subclavian Competent 12/23/2017 Y   Final   • Right Subclavian Compress 12/23/2017 C   Final   • Right Subclavian Phasic 12/23/2017 Y   Final   • Right Subclavian Spont 12/23/2017 Y   Final   • Left Subclavian Augment 12/23/2017 Y   Final   • Left Subclavian Competent 12/23/2017 Y   Final   • Left Subclavian Compress 12/23/2017 C   Final   • Left Subclavian Phasic 12/23/2017 Y   Final   • Left Subclavian Spont 12/23/2017 Y   Final   • Right Axillary Augment 12/23/2017 Y   Final   • Right Axillary Competent 12/23/2017 Y   Final   • Right Axillary Compress 12/23/2017 C   Final   • Right Axillary Phasic 12/23/2017 Y   Final   • Right Axillary Spont 12/23/2017 Y   Final   • Left  Axillary Augment 12/23/2017 Y   Final   • Left Axillary Competent 12/23/2017 Y   Final   • Left Axillary Compress 12/23/2017 C   Final   • Left Axillary Phasic 12/23/2017 Y   Final   • Left Axillary Spont 12/23/2017 Y   Final   • Right Brachial Compress 12/23/2017 C   Final   • Left Brachial Compress 12/23/2017 C   Final   • Right Radial Compress 12/23/2017 C   Final   • Left Radial Compress 12/23/2017 C   Final   • Right Ulnar Compress 12/23/2017 C   Final   • Left Ulnar Compress 12/23/2017 C   Final   • Right Basilic Upper Compress 12/23/2017 C   Final   • Left Basilic Upper Compress 12/23/2017 C   Final   • Right Basilic Forearm Compress 12/23/2017 C   Final   • Left Basilic Forearm Compress 12/23/2017 C   Final   • Right Cephalic Upper Compress 12/23/2017 C   Final   • Left Cephalic Upper Compress 12/23/2017 C   Final   • Right Cephalic Forearm Compress 12/23/2017 C   Final   • Left Cephalic Forearm Compress 12/23/2017 C   Final       DIAGNOSTICS:    MRI report indicating epidural enhancement and paraspinal enhancement without significant compression of nerve roots.    Results Review:   I reviewed the patient's new clinical results.    Vital Signs   Temp:  [98.1 °F (36.7 °C)-99.2 °F (37.3 °C)] 99.2 °F (37.3 °C)  Heart Rate:  [105-118] 109  Resp:  [18-20] 18  BP: (125-154)/(72-99) 154/99    Physical Exam:  Physical Exam   Constitutional: He is oriented to person, place, and time. He appears well-developed and well-nourished. He is cooperative. He appears ill.   HENT:   Head: Normocephalic and atraumatic.   Eyes: EOM are normal. Pupils are equal, round, and reactive to light. No scleral icterus.   Neck: Normal range of motion. Neck supple.   Cardiovascular: Normal rate, regular rhythm, normal heart sounds and intact distal pulses.  Exam reveals no decreased pulses.    No murmur heard.  Pulmonary/Chest: Effort normal and breath sounds normal.   Abdominal: Soft. Bowel sounds are normal. He exhibits distension. There  is no tenderness.   Musculoskeletal:        Lumbar back: He exhibits decreased range of motion, tenderness and bony tenderness.   Neurological: He is alert and oriented to person, place, and time. He has normal strength. He displays no atrophy. No cranial nerve deficit or sensory deficit. He exhibits normal muscle tone. He has an abnormal Heel to Spaulding Test. He has a normal Finger-Nose-Finger Test, a normal Romberg Test and a normal Tandem Gait Test. He displays a negative Romberg sign. Gait normal. Coordination and gait normal. GCS eye subscore is 4. GCS verbal subscore is 5. GCS motor subscore is 6.   Reflex Scores:       Tricep reflexes are 1+ on the right side and 1+ on the left side.       Bicep reflexes are 1+ on the right side and 1+ on the left side.       Brachioradialis reflexes are 1+ on the right side and 1+ on the left side.       Patellar reflexes are 1+ on the right side and 1+ on the left side.       Achilles reflexes are 1+ on the right side and 1+ on the left side.  Negative Monreal and clonus  Finger to nose intact      Skin: Skin is warm, dry and intact. No cyanosis. There is pallor. Nails show no clubbing.   Left greater than right leg swelling.  Right arm greater than left arm swelling and erythema.  There are needle marks of the left antecubital fossa.   Psychiatric: He has a normal mood and affect. His speech is normal and behavior is normal. Judgment and thought content normal. Cognition and memory are normal.   Vitals reviewed.    Neurologic Exam     Mental Status   Oriented to person, place, and time.   Registration: recalls 3 of 3 objects. Recall at 5 minutes: recalls 3 of 3 objects. Follows 3 step commands.   Attention: normal. Concentration: normal.   Speech: speech is normal   Level of consciousness: alert  Knowledge: good.   Able to name object. Able to read. Able to repeat. Able to write. Normal comprehension.     Cranial Nerves     CN II   Visual fields full to confrontation.     CN  III, IV, VI   Pupils are equal, round, and reactive to light.  Extraocular motions are normal.   Right pupil: Consensual response: intact.   Left pupil: Consensual response: intact.   CN III: no CN III palsy  CN VI: no CN VI palsy  Nystagmus: none   Diplopia: none  Ophthalmoparesis: none  Upgaze: normal  Downgaze: normal  Conjugate gaze: present  Vestibulo-ocular reflex: present    CN V   Facial sensation intact.     CN VII   Facial expression full, symmetric.     CN VIII   CN VIII normal.     CN IX, X   CN IX normal.     CN XI   CN XI normal.     CN XII   CN XII normal.     Motor Exam   Muscle bulk: normal  Overall muscle tone: normal  Right arm tone: normal  Left arm tone: normal  Right arm pronator drift: absent  Left arm pronator drift: absent  Right leg tone: normal  Left leg tone: normal    Strength   Strength 5/5 throughout.   Right neck flexion: 4/5  Left neck flexion: 4/5  Right neck extension: 4/5  Left neck extension: 4/5  Right deltoid: 4/5  Left deltoid: 4/5  Right biceps: 4/5  Left biceps: 4/5  Right triceps: 4/5  Left triceps: 4/5  Right wrist flexion: 4/5  Left wrist flexion: 4/5  Right wrist extension: 4/5  Left wrist extension: 4/5  Right interossei: 4/5  Left interossei: 4/5  Right abdominals: 4/5  Left abdominals: 4/5  Right iliopsoas: 4/5  Left iliopsoas: 4/5  Right quadriceps: 4/5  Left quadriceps: 4/5  Right hamstrin/5  Left hamstrin/5  Right glutei: 4/5  Left glutei: 4/5  Right anterior tibial: 4/5  Left anterior tibial: 4/5  Right posterior tibial: 4/5  Left posterior tibial: 4/5  Right peroneal: 4/5  Left peroneal: 4/5  Right gastroc: 4/5  Left gastroc: 4/5       The patient has complaints of low back and leg pain with movement of the legs.     Sensory Exam   Light touch normal.   Vibration normal.   Proprioception normal.   Pinprick normal.     Gait, Coordination, and Reflexes     Gait  Gait: normal    Coordination   Romberg: negative  Finger to nose coordination: normal  Heel to  shin coordination: abnormal  Tandem walking coordination: normal    Tremor   Resting tremor: absent  Intention tremor: absent  Action tremor: absent    Reflexes   Right brachioradialis: 1+  Left brachioradialis: 1+  Right biceps: 1+  Left biceps: 1+  Right triceps: 1+  Left triceps: 1+  Right patellar: 1+  Left patellar: 1+  Right achilles: 1+  Left achilles: 1+  Right : 1+  Left : 1+  Right plantar: normal  Left plantar: normal  Right Monreal: absent  Left Monreal: absent  Right ankle clonus: absent  Left ankle clonus: absent       Unable to ambulate       Assessment/Plan     Active Problems:    Sepsis  Back pain    PLAN: Obtain the MRI scan was done at [facility].  IV antibiotics is already instituted by admitting team.    I discussed the patients findings and my recommendations with patient, nursing staff and primary care team    Nghia Esparza MD  12/23/17  12:42 PM

## 2018-01-03 NOTE — ANESTHESIA PROCEDURE NOTES
Central Line    Patient location during procedure: holding area  Start time: 1/3/2018 1:00 PM  Stop Time:1/3/2018 1:20 PM  Indications: vascular access and MD/Surgeon request  Staff  Anesthesiologist: LINNETTE OLIVIER  Preanesthetic Checklist  Completed: patient identified, surgical consent, pre-op evaluation, timeout performed, IV checked, risks and benefits discussed and monitors and equipment checked  Central Line Prep  Sterile Tech:cap, gloves, gown, mask and sterile barriers  Prep: Betadine  Patient monitoring: blood pressure monitoring, continuous pulse oximetry and EKG  Central Line Procedure  Laterality:right  Location:internal jugular  Catheter Type:triple lumen  Guidance:ultrasound guided and landmark technique  PROCEDURE NOTE/ULTRASOUND INTERPRETATION.  Using ultrasound guidance the potential vascular sites for insertion of the catheter were visualized to determine the patency of the vessel to be used for vascular access.  After selecting the appropriate site for insertion, the needle was visualized under ultrasound being inserted into the internal jugular vein, followed by ultrasound confirmation of wire and catheter placement. There were no abnormalities seen on ultrasound; an image was taken; and the patient tolerated the procedure with no complications.   Assessment  Post procedure:biopatch applied, line sutured, occlusive dressing applied and secured with tape  Assessement:blood return through all ports and free fluid flow  Complications:no  Patient Tolerance:patient tolerated the procedure well with no apparent complications

## 2018-01-03 NOTE — PLAN OF CARE
Problem: Patient Care Overview (Adult)  Goal: Plan of Care Review  Outcome: Ongoing (interventions implemented as appropriate)   01/03/18 1256   Coping/Psychosocial Response Interventions   Plan Of Care Reviewed With patient   Patient Care Overview   Progress no change   Outcome Evaluation   Outcome Summary/Follow up Plan preparing for surgery       Problem: Perioperative Period (Adult)  Goal: Signs and Symptoms of Listed Potential Problems Will be Absent or Manageable (Perioperative Period)  Outcome: Ongoing (interventions implemented as appropriate)   01/03/18 1256   Perioperative Period   Problems Assessed (Perioperative Period) pain;infection;situational response   Problems Present (Perioperative Period) pain;infection

## 2018-01-03 NOTE — BRIEF OP NOTE
THORACIC LAMINECTOMY DECOMPRESSIVE POSTERIOR  Progress Note    Sam Barrett  12/23/2017 - 1/3/2018    Pre-op Diagnosis:   Abnormal MRI, lumbar spine [R93.7]       Post-Op Diagnosis Codes:     * Abnormal MRI, lumbar spine [R93.7]    Procedure/CPT® Codes:      Procedure(s):  T9 through S1 posterior lumbar decompression for epidural abscess.    Surgeon(s):  Nghia Esparza MD    Anesthesia: General    Staff:   Circulator: Cody Jones RN; Shannon Spurling, RN  Radiology Technologist: Cassi Burgess  Scrub Person: Veronica Juarez; Jeanne Morales CSA; Jeanne Smith    Estimated Blood Loss: 1000 mL    Urine Voided: 300 mL    Specimens:                  ID Type Source Tests Collected by Time Destination   1 : EPIDURAL SPACE Swab Spine, Thoracic ANAEROBIC CULTURE, GRAM STAIN Nghia Esparza MD 1/3/2018 1639    2 : PARASPINAL  TISSUE Tissue Spine, Thoracic ANAEROBIC CULTURE, FUNGAL CULTURE, TISSUE / BONE CULTURE, AFB CULTURE Nghia Esparza MD 1/3/2018 1642    3 : EPIDURAL SPACE TISSUE Tissue Spine, Thoracic ANAEROBIC CULTURE, FUNGAL CULTURE, TISSUE / BONE CULTURE, AFB CULTURE Nghia Esparza MD 1/3/2018 1656    A : EPIDURAL SPACE TISSUE Tissue Spine, Thoracic TISSUE EXAM Nghia Esparza MD 1/3/2018 1710          Drains:   Drain/Device Site 01/03/18 1818 upper back collapsible closed device (Active)       Drain/Device Site 01/03/18 1819 lower back collapsible closed device (Active)       Urethral Catheter 12/23/17 0000 (Active)   Daily Indications Acute retention 1/3/2018  9:57 AM   Securement secured to upper leg with adhesive device 1/3/2018 12:57 PM   Foreskin circumcised 12/31/2017 12:20 AM   Catheter care done Yes 1/3/2018 11:35 AM   Tolerance no signs/symptoms of discomfort 1/1/2018  7:38 PM   Intake (mL) 200 12/28/2017  5:00 PM   Urine Output (mL) 475 1/3/2018 12:22 PM           Findings: Epidural pus and phlegmon    Complications: none      Nghia Esparza MD     Date: 1/3/2018  Time:  6:29 PM

## 2018-01-03 NOTE — ANESTHESIA PROCEDURE NOTES
Airway  Urgency: elective    Airway not difficult    General Information and Staff    Patient location during procedure: OR  Anesthesiologist: EDWIN LOCK  CRNA: BARBI FATIMA    Indications and Patient Condition  Indications for airway management: airway protection    Preoxygenated: yes  MILS not maintained throughout  Mask difficulty assessment: 1 - vent by mask    Final Airway Details  Final airway type: endotracheal airway      Successful airway: ETT  Cuffed: yes   Successful intubation technique: direct laryngoscopy  Facilitating devices/methods: intubating stylet  Endotracheal tube insertion site: oral  Blade: José Miguel  Blade size: #3  ETT size: 8.0 mm  Cormack-Lehane Classification: grade I - full view of glottis  Placement verified by: chest auscultation   Cuff volume (mL): 9  Measured from: lips  ETT to lips (cm): 23  Number of attempts at approach: 1    Additional Comments  PreO2 100% face mask, IV induction, easy mask, DVL x1, cords noted, tube through, cuff up, EBBSH, +etCO2, = chest movement, tube secured in place, atraumatic, teeth and lips intact as preop.

## 2018-01-03 NOTE — ANESTHESIA PREPROCEDURE EVALUATION
Anesthesia Evaluation     Patient summary reviewed and Nursing notes reviewed          Airway   Dental    (+) upper dentures and lower dentures    Pulmonary    (+) a smoker Current,   Cardiovascular - negative cardio ROS    ECG reviewed  Rhythm: regular  Rate: abnormal        Neuro/Psych- negative ROS  GI/Hepatic/Renal/Endo - negative ROS     Musculoskeletal (-) negative ROS    Abdominal    Substance History - negative use     OB/GYN negative ob/gyn ROS         Other                                                Anesthesia Plan    ASA 3 - emergent     general   (Opiod IV use   Central line  Art line  Petit in place  Dentures out)  intravenous induction   Anesthetic plan and risks discussed with patient.

## 2018-01-04 ENCOUNTER — APPOINTMENT (OUTPATIENT)
Dept: GENERAL RADIOLOGY | Facility: HOSPITAL | Age: 58
End: 2018-01-04

## 2018-01-04 PROBLEM — D62 POSTOPERATIVE ANEMIA DUE TO ACUTE BLOOD LOSS: Status: ACTIVE | Noted: 2018-01-04

## 2018-01-04 PROBLEM — E11.9 TYPE 2 DIABETES MELLITUS WITHOUT COMPLICATION (HCC): Status: ACTIVE | Noted: 2018-01-04

## 2018-01-04 LAB
ANION GAP SERPL CALCULATED.3IONS-SCNC: 12 MMOL/L
BASOPHILS # BLD AUTO: 0.02 10*3/MM3 (ref 0–0.2)
BASOPHILS NFR BLD AUTO: 0.2 % (ref 0–1.5)
BH CV UPPER VENOUS LEFT INTERNAL JUGULAR AUGMENT: NORMAL
BH CV UPPER VENOUS LEFT INTERNAL JUGULAR COMPETENT: NORMAL
BH CV UPPER VENOUS LEFT INTERNAL JUGULAR COMPRESS: NORMAL
BH CV UPPER VENOUS LEFT INTERNAL JUGULAR PHASIC: NORMAL
BH CV UPPER VENOUS LEFT INTERNAL JUGULAR SPONT: NORMAL
BH CV UPPER VENOUS LEFT SUBCLAVIAN AUGMENT: NORMAL
BH CV UPPER VENOUS LEFT SUBCLAVIAN COMPETENT: NORMAL
BH CV UPPER VENOUS LEFT SUBCLAVIAN COMPRESS: NORMAL
BH CV UPPER VENOUS LEFT SUBCLAVIAN PHASIC: NORMAL
BH CV UPPER VENOUS LEFT SUBCLAVIAN SPONT: NORMAL
BH CV UPPER VENOUS RIGHT AXILLARY AUGMENT: NORMAL
BH CV UPPER VENOUS RIGHT AXILLARY COMPETENT: NORMAL
BH CV UPPER VENOUS RIGHT AXILLARY COMPRESS: NORMAL
BH CV UPPER VENOUS RIGHT AXILLARY PHASIC: NORMAL
BH CV UPPER VENOUS RIGHT AXILLARY SPONT: NORMAL
BH CV UPPER VENOUS RIGHT BASILIC FOREARM COMPRESS: NORMAL
BH CV UPPER VENOUS RIGHT BASILIC UPPER COMPRESS: NORMAL
BH CV UPPER VENOUS RIGHT BRACHIAL COMPRESS: NORMAL
BH CV UPPER VENOUS RIGHT CEPHALIC FOREARM COMPRESS: NORMAL
BH CV UPPER VENOUS RIGHT CEPHALIC UPPER COMPRESS: NORMAL
BH CV UPPER VENOUS RIGHT INTERNAL JUGULAR AUGMENT: NORMAL
BH CV UPPER VENOUS RIGHT INTERNAL JUGULAR COMPETENT: NORMAL
BH CV UPPER VENOUS RIGHT INTERNAL JUGULAR COMPRESS: NORMAL
BH CV UPPER VENOUS RIGHT INTERNAL JUGULAR PHASIC: NORMAL
BH CV UPPER VENOUS RIGHT INTERNAL JUGULAR SPONT: NORMAL
BH CV UPPER VENOUS RIGHT RADIAL COMPRESS: NORMAL
BH CV UPPER VENOUS RIGHT SUBCLAVIAN AUGMENT: NORMAL
BH CV UPPER VENOUS RIGHT SUBCLAVIAN COMPETENT: NORMAL
BH CV UPPER VENOUS RIGHT SUBCLAVIAN COMPRESS: NORMAL
BH CV UPPER VENOUS RIGHT SUBCLAVIAN PHASIC: NORMAL
BH CV UPPER VENOUS RIGHT SUBCLAVIAN SPONT: NORMAL
BH CV UPPER VENOUS RIGHT ULNAR COMPRESS: NORMAL
BUN BLD-MCNC: 24 MG/DL (ref 6–20)
BUN/CREAT SERPL: 20.3 (ref 7–25)
CALCIUM SPEC-SCNC: 9 MG/DL (ref 8.6–10.5)
CHLORIDE SERPL-SCNC: 97 MMOL/L (ref 98–107)
CO2 SERPL-SCNC: 24 MMOL/L (ref 22–29)
CREAT BLD-MCNC: 1.18 MG/DL (ref 0.76–1.27)
CRP SERPL-MCNC: 10.04 MG/DL (ref 0–0.5)
DEPRECATED RDW RBC AUTO: 48.7 FL (ref 37–54)
DEPRECATED RDW RBC AUTO: 51.9 FL (ref 37–54)
EOSINOPHIL # BLD AUTO: 0.01 10*3/MM3 (ref 0–0.7)
EOSINOPHIL NFR BLD AUTO: 0.1 % (ref 0.3–6.2)
ERYTHROCYTE [DISTWIDTH] IN BLOOD BY AUTOMATED COUNT: 14.3 % (ref 11.5–14.5)
ERYTHROCYTE [DISTWIDTH] IN BLOOD BY AUTOMATED COUNT: 16.4 % (ref 11.5–14.5)
GFR SERPL CREATININE-BSD FRML MDRD: 64 ML/MIN/1.73
GLUCOSE BLD-MCNC: 147 MG/DL (ref 65–99)
GLUCOSE BLDC GLUCOMTR-MCNC: 167 MG/DL (ref 70–130)
GLUCOSE BLDC GLUCOMTR-MCNC: 169 MG/DL (ref 70–130)
GLUCOSE BLDC GLUCOMTR-MCNC: 172 MG/DL (ref 70–130)
GLUCOSE BLDC GLUCOMTR-MCNC: 221 MG/DL (ref 70–130)
HBV SURFACE AG SERPL QL IA: NORMAL
HCT VFR BLD AUTO: 29.7 % (ref 40.4–52.2)
HCT VFR BLD AUTO: 29.8 % (ref 40.4–52.2)
HCV AB SER DONR QL: REACTIVE
HGB BLD-MCNC: 9.3 G/DL (ref 13.7–17.6)
HGB BLD-MCNC: 9.4 G/DL (ref 13.7–17.6)
HIV1 P24 AG SER QL: NORMAL
HIV1+2 AB SER QL: NORMAL
IMM GRANULOCYTES # BLD: 0.05 10*3/MM3 (ref 0–0.03)
IMM GRANULOCYTES NFR BLD: 0.5 % (ref 0–0.5)
LYMPHOCYTES # BLD AUTO: 1.89 10*3/MM3 (ref 0.9–4.8)
LYMPHOCYTES NFR BLD AUTO: 18.3 % (ref 19.6–45.3)
MCH RBC QN AUTO: 29.5 PG (ref 27–32.7)
MCH RBC QN AUTO: 30.1 PG (ref 27–32.7)
MCHC RBC AUTO-ENTMCNC: 31.3 G/DL (ref 32.6–36.4)
MCHC RBC AUTO-ENTMCNC: 31.5 G/DL (ref 32.6–36.4)
MCV RBC AUTO: 94.3 FL (ref 79.8–96.2)
MCV RBC AUTO: 95.5 FL (ref 79.8–96.2)
MONOCYTES # BLD AUTO: 0.99 10*3/MM3 (ref 0.2–1.2)
MONOCYTES NFR BLD AUTO: 9.6 % (ref 5–12)
NEUTROPHILS # BLD AUTO: 7.35 10*3/MM3 (ref 1.9–8.1)
NEUTROPHILS NFR BLD AUTO: 71.3 % (ref 42.7–76)
NRBC BLD MANUAL-RTO: 0 /100 WBC (ref 0–0)
PLATELET # BLD AUTO: 316 10*3/MM3 (ref 140–500)
PLATELET # BLD AUTO: 367 10*3/MM3 (ref 140–500)
PMV BLD AUTO: 10.5 FL (ref 6–12)
PMV BLD AUTO: 11.5 FL (ref 6–12)
POTASSIUM BLD-SCNC: 5 MMOL/L (ref 3.5–5.2)
RBC # BLD AUTO: 3.12 10*6/MM3 (ref 4.6–6)
RBC # BLD AUTO: 3.15 10*6/MM3 (ref 4.6–6)
SODIUM BLD-SCNC: 133 MMOL/L (ref 136–145)
WBC NRBC COR # BLD: 10.31 10*3/MM3 (ref 4.5–10.7)
WBC NRBC COR # BLD: 9.33 10*3/MM3 (ref 4.5–10.7)

## 2018-01-04 PROCEDURE — 82962 GLUCOSE BLOOD TEST: CPT

## 2018-01-04 PROCEDURE — 92611 MOTION FLUOROSCOPY/SWALLOW: CPT | Performed by: SPEECH-LANGUAGE PATHOLOGIST

## 2018-01-04 PROCEDURE — 25010000002 ENOXAPARIN PER 10 MG: Performed by: INTERNAL MEDICINE

## 2018-01-04 PROCEDURE — 94799 UNLISTED PULMONARY SVC/PX: CPT

## 2018-01-04 PROCEDURE — G0432 EIA HIV-1/HIV-2 SCREEN: HCPCS | Performed by: NEUROLOGICAL SURGERY

## 2018-01-04 PROCEDURE — 99233 SBSQ HOSP IP/OBS HIGH 50: CPT | Performed by: INTERNAL MEDICINE

## 2018-01-04 PROCEDURE — 87899 AGENT NOS ASSAY W/OPTIC: CPT | Performed by: NEUROLOGICAL SURGERY

## 2018-01-04 PROCEDURE — 97164 PT RE-EVAL EST PLAN CARE: CPT

## 2018-01-04 PROCEDURE — 80048 BASIC METABOLIC PNL TOTAL CA: CPT | Performed by: INTERNAL MEDICINE

## 2018-01-04 PROCEDURE — 85027 COMPLETE CBC AUTOMATED: CPT | Performed by: NEUROLOGICAL SURGERY

## 2018-01-04 PROCEDURE — 86803 HEPATITIS C AB TEST: CPT | Performed by: NEUROLOGICAL SURGERY

## 2018-01-04 PROCEDURE — 63710000001 INSULIN ASPART PER 5 UNITS: Performed by: INTERNAL MEDICINE

## 2018-01-04 PROCEDURE — 87521 HEPATITIS C PROBE&RVRS TRNSC: CPT | Performed by: NEUROLOGICAL SURGERY

## 2018-01-04 PROCEDURE — 86140 C-REACTIVE PROTEIN: CPT | Performed by: INTERNAL MEDICINE

## 2018-01-04 PROCEDURE — 85025 COMPLETE CBC W/AUTO DIFF WBC: CPT | Performed by: NEUROLOGICAL SURGERY

## 2018-01-04 PROCEDURE — 74230 X-RAY XM SWLNG FUNCJ C+: CPT

## 2018-01-04 PROCEDURE — 25010000002 VANCOMYCIN 10 G RECONSTITUTED SOLUTION: Performed by: NEUROLOGICAL SURGERY

## 2018-01-04 PROCEDURE — 99024 POSTOP FOLLOW-UP VISIT: CPT | Performed by: NEUROLOGICAL SURGERY

## 2018-01-04 PROCEDURE — 25010000002 FENTANYL CITRATE (PF) 100 MCG/2ML SOLUTION: Performed by: INTERNAL MEDICINE

## 2018-01-04 PROCEDURE — 99231 SBSQ HOSP IP/OBS SF/LOW 25: CPT | Performed by: INTERNAL MEDICINE

## 2018-01-04 PROCEDURE — 87340 HEPATITIS B SURFACE AG IA: CPT | Performed by: NEUROLOGICAL SURGERY

## 2018-01-04 PROCEDURE — 25010000003 PENICILLIN G POTASSIUM PER 600000 UNITS: Performed by: INTERNAL MEDICINE

## 2018-01-04 PROCEDURE — 97110 THERAPEUTIC EXERCISES: CPT

## 2018-01-04 RX ORDER — HYDROCODONE BITARTRATE AND ACETAMINOPHEN 10; 325 MG/1; MG/1
TABLET ORAL
Status: COMPLETED
Start: 2018-01-04 | End: 2018-01-04

## 2018-01-04 RX ORDER — FENTANYL CITRATE 50 UG/ML
50 INJECTION, SOLUTION INTRAMUSCULAR; INTRAVENOUS
Status: DISCONTINUED | OUTPATIENT
Start: 2018-01-04 | End: 2018-01-05

## 2018-01-04 RX ORDER — OXYCODONE AND ACETAMINOPHEN 10; 325 MG/1; MG/1
1 TABLET ORAL EVERY 4 HOURS PRN
Status: DISCONTINUED | OUTPATIENT
Start: 2018-01-04 | End: 2018-01-11 | Stop reason: HOSPADM

## 2018-01-04 RX ADMIN — METFORMIN HYDROCHLORIDE 500 MG: 500 TABLET ORAL at 09:24

## 2018-01-04 RX ADMIN — MORPHINE SULFATE 30 MG: 30 TABLET, EXTENDED RELEASE ORAL at 20:43

## 2018-01-04 RX ADMIN — IPRATROPIUM BROMIDE AND ALBUTEROL SULFATE 3 ML: .5; 3 SOLUTION RESPIRATORY (INHALATION) at 20:34

## 2018-01-04 RX ADMIN — FENTANYL CITRATE 50 MCG: 50 INJECTION, SOLUTION INTRAMUSCULAR; INTRAVENOUS at 15:09

## 2018-01-04 RX ADMIN — METOPROLOL TARTRATE 50 MG: 50 TABLET, FILM COATED ORAL at 09:24

## 2018-01-04 RX ADMIN — INSULIN ASPART 2 UNITS: 100 INJECTION, SOLUTION INTRAVENOUS; SUBCUTANEOUS at 20:42

## 2018-01-04 RX ADMIN — OXYCODONE HYDROCHLORIDE 15 MG: 15 TABLET ORAL at 13:03

## 2018-01-04 RX ADMIN — SODIUM CHLORIDE 4 MILLION UNITS: 0.9 INJECTION INTRAVENOUS at 18:50

## 2018-01-04 RX ADMIN — METOPROLOL TARTRATE 50 MG: 50 TABLET, FILM COATED ORAL at 01:09

## 2018-01-04 RX ADMIN — GLIPIZIDE 10 MG: 10 TABLET ORAL at 10:29

## 2018-01-04 RX ADMIN — ATORVASTATIN CALCIUM 40 MG: 20 TABLET, FILM COATED ORAL at 10:28

## 2018-01-04 RX ADMIN — SODIUM CHLORIDE 4 MILLION UNITS: 0.9 INJECTION INTRAVENOUS at 02:43

## 2018-01-04 RX ADMIN — IPRATROPIUM BROMIDE AND ALBUTEROL SULFATE 3 ML: .5; 3 SOLUTION RESPIRATORY (INHALATION) at 08:52

## 2018-01-04 RX ADMIN — HYDROCODONE BITARTRATE AND ACETAMINOPHEN 1 TABLET: 10; 325 TABLET ORAL at 23:52

## 2018-01-04 RX ADMIN — FENTANYL CITRATE 50 MCG: 50 INJECTION, SOLUTION INTRAMUSCULAR; INTRAVENOUS at 06:59

## 2018-01-04 RX ADMIN — HYDROCODONE BITARTRATE AND ACETAMINOPHEN 1 TABLET: 10; 325 TABLET ORAL at 02:12

## 2018-01-04 RX ADMIN — METOPROLOL TARTRATE 50 MG: 50 TABLET, FILM COATED ORAL at 18:49

## 2018-01-04 RX ADMIN — SODIUM CHLORIDE 4 MILLION UNITS: 0.9 INJECTION INTRAVENOUS at 15:09

## 2018-01-04 RX ADMIN — INSULIN ASPART 3 UNITS: 100 INJECTION, SOLUTION INTRAVENOUS; SUBCUTANEOUS at 13:03

## 2018-01-04 RX ADMIN — OXYCODONE HYDROCHLORIDE 15 MG: 15 TABLET ORAL at 05:40

## 2018-01-04 RX ADMIN — METFORMIN HYDROCHLORIDE 500 MG: 500 TABLET ORAL at 20:22

## 2018-01-04 RX ADMIN — OXYCODONE HYDROCHLORIDE 15 MG: 15 TABLET ORAL at 18:49

## 2018-01-04 RX ADMIN — ENOXAPARIN SODIUM 40 MG: 40 INJECTION SUBCUTANEOUS at 20:22

## 2018-01-04 RX ADMIN — FENTANYL CITRATE 50 MCG: 50 INJECTION, SOLUTION INTRAMUSCULAR; INTRAVENOUS at 10:45

## 2018-01-04 RX ADMIN — MORPHINE SULFATE 30 MG: 30 TABLET, EXTENDED RELEASE ORAL at 09:24

## 2018-01-04 RX ADMIN — VANCOMYCIN HYDROCHLORIDE 1750 MG: 100 INJECTION, POWDER, LYOPHILIZED, FOR SOLUTION INTRAVENOUS at 06:00

## 2018-01-04 RX ADMIN — SODIUM CHLORIDE 4 MILLION UNITS: 0.9 INJECTION INTRAVENOUS at 10:49

## 2018-01-04 RX ADMIN — SODIUM CHLORIDE 4 MILLION UNITS: 0.9 INJECTION INTRAVENOUS at 22:56

## 2018-01-04 RX ADMIN — IPRATROPIUM BROMIDE AND ALBUTEROL SULFATE 3 ML: .5; 3 SOLUTION RESPIRATORY (INHALATION) at 11:36

## 2018-01-04 NOTE — OP NOTE
T9 through S1 laminectomy for decompression of epidural abscess with L45 decompressive laminectomy medial facetectomy and foraminotomy for lumbar spinal stenosis    Procedure Note    Sam DIAMOND Arnold  12/23/2017 - 1/4/2018  [id number]    SURGEON  Nghia Esparza MD    ASSISTANT:  Jeanne Morales CSA  INDICATION:  The patient has evidence of infection.  He presented with sepsis.  He has back pain.  He has been treated with antibiotics.  Despite antibiotic therapy MRIs demonstrated progression of enhancement in the development of epidural fluid/abscess in the thoracic and lumbar spine.  It is indicated to decompress the thoracic and lumbar spine both from a diagnostic standpoint for culture and sensitivity but also to allow a better chance of treating his infection.    Pre-op Diagnosis:   Abnormal MRI, lumbar spine and thoracic spine [R93.7]  Lumbar spinal stenosis secondary to degenerative disc disease at L4 5    Post-Op Diagnosis Codes:     * Abnormal MRI, lumbar and thoracic spine [R93.7]  Lumbar spinal stenosis secondary to degenerative disc disease at L4 5    PROCEDURE PERFORMED:  Procedure(s):  T9 through S1 posterior lumbar decompression for epidural abscess.  L4/5 decompressive laminectomy medial facetectomy and foraminotomies    Anesthesia: General    Staff:   Circulator: Cody Jones, DELIO; Shannon Spurling, RN  Radiology Technologist: Cassi Burgess  Scrub Person: Veronica Juarez; Jeanne Morales CSA; Jeanne Smith    Estimated Blood Loss: 1000 mL    Specimens:   Order Name Source Comment Collection Info Order Time   ANAEROBIC CULTURE Spine, Thoracic  Collected By: Nghia Esparza MD 1/3/2018  4:51 PM   FUNGUS CULTURE Spine, Thoracic  Collected By: Nghia Esparza MD 1/3/2018  4:51 PM   TISSUE / BONE CULTURE Spine, Thoracic  Collected By: Nghia Esparza MD 1/3/2018  4:51 PM   AFB CULTURE Spine, Thoracic  Collected By: Nghia Esparza MD 1/3/2018  4:51 PM   ANAEROBIC CULTURE Spine,  Thoracic  Collected By: Nghia Esparza MD 1/3/2018  4:56 PM   ANAEROBIC CULTURE Spine, Thoracic  Collected By: Nghia Esparza MD 1/3/2018  4:56 PM   FUNGUS CULTURE Spine, Thoracic  Collected By: Nghia Esparza MD 1/3/2018  4:56 PM   TISSUE / BONE CULTURE Spine, Thoracic  Collected By: Nghia Esparza MD 1/3/2018  4:56 PM   AFB CULTURE Spine, Thoracic  Collected By: Nghia Esparza MD 1/3/2018  4:56 PM   HEMOGLOBIN AND HEMATOCRIT, BLOOD   Collected By: Non Lab Personnel 1/3/2018  7:14 PM   TISSUE PATHOLOGY EXAM Spine, Thoracic  Collected By: Nghia Esparza MD 1/3/2018  5:10 PM            Drains:   Drain/Device Site 01/03/18 1818 upper back collapsible closed device (Active)   Insertion Site occlusive dressing intact 1/4/2018  4:00 AM   General output (mL) 60 1/4/2018  1:00 AM       Drain/Device Site 01/03/18 1819 lower back collapsible closed device (Active)   Insertion Site occlusive dressing intact 1/4/2018  4:00 AM   General output (mL) 20 1/4/2018  1:00 AM       Urethral Catheter 12/23/17 0000 (Active)   Daily Indications Acute retention 1/3/2018  9:57 AM   Securement secured to upper leg with adhesive device 1/4/2018  4:00 AM   Foreskin circumcised 12/31/2017 12:20 AM   Catheter care done Yes 1/3/2018 10:00 PM   Tolerance no signs/symptoms of discomfort 1/1/2018  7:38 PM   Intake (mL) 200 12/28/2017  5:00 PM   Urine Output (mL) 850 1/4/2018  5:44 AM           Findings: Severe lumbar spinal stenosis secondary to ligamentum flavum and facet hypertrophy at L4 5.  Additionally there was significant thecal sac compression in the lumbar and thoracic region which was alleviated by evacuating the fluid pus and debriding the phlegmon that had developed secondary to infection.    Complications: None    Details: The patient was brought to the operating room where general endotracheal anesthesia was induced.      Positioning: The patient was then turned prone onto the Sylvester table and care was taken to  paddle susceptible areas.  The lumbar area was prepped and draped in the usual sterile manner.    Approach: Incision was made from the T8 through S1 spinous process and the  paraspinous musculature was then elevated from the spinous process and the lamina.  X-ray confirmation of the appropriate levels for incision was done with needles placed through the skin in the AP and lateral projections and methylene blue injected through the needle onto the lamina.  X-ray confirmation of the appropriate levels was then accomplished after exposure.  I removed the spinous process of T9 through S1.     Microscope: The operating microscope was then draped sterilely and brought into the field and the rest of the operation performed under operative magnification with microdissection technique and micro-illumination.    Decompression: Using the high-speed drill a bilateral laminotomy of T9 through S1 was accomplished.    I immediately pus essentially throughout the entirety of the area up to the superior portion of T9.  There was a thick layer of pus and phlegmon from L5 to S2.  After removal of the lamina I debrided the posterior cultures were obtained.  Tissue was sent as well for culture.  I also sent some of the phlegmon for pathological evaluation.      I turned my attention to the degenerative disc disease at L4 5 where the facets were highly hypertrophic as well as ligamentum flavum.      A medial facetectomy, foraminotomy, and removal of the hypertrophic ligamentum flavum was then done.  A Tiffanie elevator was used to palpate the pedicles.  I could pass the Glynn elevator out into the neural foramen without any impediment of the nerve root.    Closure: Copious irrigation, hemostasis was obtained, large drains were placed into the epidural space and the wound was then closed in appropriate layers and the skin reapproximated with Monocryl and Dermabond.  The patient was then awakened from general endotracheal anesthesia  extubated and taken the recovery room in stable and good condition.      Nghia Esparza MD     Date: 1/4/2018  Time: 8:39 AM

## 2018-01-04 NOTE — CONSULTS
Adult Nutrition  Assessment/PES    Patient Name:  Sam Barrett  YOB: 1960  MRN: [id number]  Admit Date:  12/23/2017    Assessment Date:  1/4/2018    Comments:  Diet advanced.  Will begin diet education when appropriate.          Reason for Assessment       01/04/18 0944    Reason for Assessment    Reason For Assessment/Visit follow up protocol    Other diagnosis s/p T9 through S1 posterior lumbar decompression for epidural abscess                   Labs/Tests/Procedures/Meds       01/04/18 0945    Labs/Tests/Procedures/Meds    Diagnostic Test/Procedure Review reviewed    Labs/Tests Review Na+;Glucose;BUN;Hgb A1C;Alb;Reviewed    Medication Review Reviewed, pertinent;Diabetic Oral Agent;Insulin;Anticoag;Antibiotic    Significant Vitals reviewed            Physical Findings       01/04/18 0947    Physical Appearance    Overall Physical Appearance obese    Skin surgical wound              Nutrition Prescription Ordered       01/04/18 0947    Nutrition Prescription PO    Current PO Diet Soft Texture    Texture Ground    Common Modifiers Cardiac;Consistent Carbohydrate    Other Modifiers --   no mix cons, no straws              Problem/Interventions:                  Intervention Goal       01/04/18 0949    Intervention Goal    General Maintain nutrition;Disease management/therapy    PO PO intake (%)    PO Intake % 80 %    Weight No significant weight loss            Nutrition Intervention       01/04/18 0949    Nutrition Intervention    RD/Tech Action Follow Tx progress;Care plan reviewd              Education/Evaluation       01/04/18 0949    Education    Education --   s/p surgery not ready for education    Monitor/Evaluation    Monitor Per protocol;I&O;PO intake;Pertinent labs;Weight;Skin status        Electronically signed by:  Edith Lee RD  01/04/18 9:50 AM

## 2018-01-04 NOTE — PLAN OF CARE
Problem: Patient Care Overview (Adult)  Goal: Plan of Care Review  Outcome: Ongoing (interventions implemented as appropriate)   01/04/18 1700   Coping/Psychosocial Response Interventions   Plan Of Care Reviewed With patient   Outcome Evaluation   Outcome Summary/Follow up Plan Pt presents with impaired functional mobility and gait secondary to generalized weakness, pain, and decreased activity tolerance s/p back surgery. Pt may benefit from skilled PT to address strength, mobility, and functional independence.       Problem: Inpatient Physical Therapy  Goal: Bed Mobility Goal LTG- PT  Outcome: Ongoing (interventions implemented as appropriate)   01/04/18 1700   Bed Mobility PT LTG   Bed Mobility PT LTG, Time to Achieve 1 wk   Bed Mobility PT LTG, Activity Type all bed mobility   Bed Mobility PT LTG, [place] Level minimum assist (75% patient effort)     Goal: Transfer Training Goal 1 LTG- PT  Outcome: Ongoing (interventions implemented as appropriate)   01/04/18 1700   Transfer Training PT LTG   Transfer Training PT LTG, Time to Achieve 1 wk   Transfer Training PT LTG, Activity Type all transfers   Transfer Training PT LTG, [place] Level minimum assist (75% patient effort)   Transfer Training PT LTG, Assist Device walker, rolling     Goal: Gait Training Goal LTG- PT  Outcome: Ongoing (interventions implemented as appropriate)   01/04/18 1700   Gait Training PT LTG   Gait Training Goal PT LTG, Time to Achieve 1 wk   Gait Training Goal PT LTG, [place] Level minimum assist (75% patient effort)   Gait Training Goal PT LTG, Assist Device walker, rolling   Gait Training Goal PT LTG, Distance to Achieve 30

## 2018-01-04 NOTE — PLAN OF CARE
Problem: Inpatient SLP  Goal: Dysphagia- Patient will safely consume diet as per recommendation with no signs/symptoms of aspiration  Outcome: Ongoing (interventions implemented as appropriate)   01/04/18 5111   Safely Consume Diet   Safely Consume Diet- SLP, Time to Achieve by discharge   Safely Consume Diet- SLP, Outcome goal ongoing

## 2018-01-04 NOTE — PROGRESS NOTES
" LOS: 12 days     Chief Complaint:  Follow-up GBS septicemia, T&L spine abscess    Interval History: OR yesterday for T9 through S1 posterior lumbar decompression for epidural abscess. Required ICU post-op. He reports constant dull pain in the back worse w/ movement and relieved w/ rest. No associated fevers. Petit is out. His RUE has sharp intermittent pain worse w/ palpation. Vascular repeating US.    ROS: no n/v/d; no rash    Vital Signs  Temp:  [98 °F (36.7 °C)-98.6 °F (37 °C)] 98.1 °F (36.7 °C)  Heart Rate:  [] 98  Resp:  [14-20] 20  BP: ()/() 107/56  Arterial Line BP: (106-136)/(66-96) 123/66    Physical Exam:  /56  Pulse 98  Temp 98.1 °F (36.7 °C)  Resp 20  Ht 195.6 cm (77.01\")  Wt 119 kg (261 lb 11.2 oz)  SpO2 99%  BMI 31.03 kg/m2  Body mass index is 31.03 kg/(m^2).    General: awake  Head: Normocephalic, no trauma  Eyes: no scleral icterus, no conjunctival pallor, no conjunctival hemorrhages.   ENT: MM dry, OP clear, no thrush. Fair dentition.   Neck: Supple  Cardiovascular: tachycardic, RR, no murmurs, rubs, or gallops; + RLE and RUE edema  Respiratory: lungs are clear;  no wheezing on ambient air  GI: Abdomen is soft, non-tender, non-distended  : Petit removed  Musculoskeletal: drain in back, could not view incision  Skin: RUE pain and swelling are worse, moderate TTP  Neurological: 4/5 strength in LEs, 5/5 in UEs  Psychiatric: Normal mood and affect   Vasc: no cyanosis; RIJ TLC w/o erythema    Antibiotics:  PCN G 4 million units q4h    LABS:  CBC, BMP, micro, CRP reviewed today  Lab Results   Component Value Date    WBC 10.31 01/04/2018    HGB 9.3 (L) 01/04/2018    HCT 29.7 (L) 01/04/2018    MCV 94.3 01/04/2018     01/04/2018     Lab Results   Component Value Date    GLUCOSE 147 (H) 01/04/2018    BUN 24 (H) 01/04/2018    CREATININE 1.18 01/04/2018    EGFRIFNONA 64 01/04/2018    BCR 20.3 01/04/2018    K 5.0 01/04/2018    CO2 24.0 01/04/2018    CALCIUM 9.0 01/04/2018 "    ALBUMIN 2.50 (L) 01/03/2018    LABIL2 0.6 01/03/2018    AST 37 01/03/2018    ALT 42 (H) 01/03/2018     Lab Results   Component Value Date    CRP 10.04 (H) 01/04/2018     Lab Results   Component Value Date    HGBA1C 11.44 (H) 12/23/2017     Hep C Ab positive  HCV RNA 6.2 million and genotype 2b    [facility] Group B Strep in 2/2 sets (sensitive to PCN and ceftriaxone)  BCx: 12/23 negative  1/3 OR Back Cx: pending; GPCs on gram stain    Radiology (personally reviewed):   No new imaging today    Assessment/Plan   1. Group B Strep septicemia secondary to IV drug use  -continue PCN G 4 million units q4h   -repeat blood cultures negative to date  -ARUNA negative    2. Lumbar and thoracic spine epidural abscesses  - s/p T9 through S1 posterior lumbar decompression for epidural abscess on 1/3/18  -f/u operative cultures but expect Group B Strep  - Continue antibiotic as per #1 above; will plan 6 weeks of antibiotics after I&D; stop date 2/16/17     3. History of IV drug abuse  -HIV negative; UDS with opiates     4. Right upper and lower extremity erythema and edema  -cephalic vein infection per CT; no abscess  -noted vascular surgery consultation  -ultrasound pending    5. Uncontrolled DM2 in obese     6. Chronic Hep C   -HCV RNA 6.2 million and genotype 2b  -will address treatment as outpatient    ID will follow. Case d/w Dr Esparza.

## 2018-01-04 NOTE — MBS/VFSS/FEES
Acute Care - Speech Language Pathology   Swallow Re-Evaluation [facility]     Patient Name: Sam Barrett  : 1960  MRN: [id number]  Today's Date: 2018  Onset of Illness/Injury or Date of Surgery Date: 17            Admit Date: 2017  SPEECH-LANGUAGE PATHOLOGY EVALUTION - VFSS  Subjective: The patient was seen on this date for a VFSS(Videofluoroscopic Swallowing Study).  Patient was alert and cooperative.    Significant history:  VFSS  showed silent aspiration thin and penetration of honey which did not clear from laryngeal vestibule.  Objective: Risks/benefits were reviewed with the patient, and consent was obtained. The study was completed with SLP present and Radiologist review. The patient was seen in lateral view(s). Textures given included thin liquid, nectar thick liquid, honey thick liquid, puree consistency, mechanical soft consistency and regular consistency.  Assessment:  Pt presents with mild dysphagia caused by fatigue.  Initially no penetration or aspiration with thin or nectar thick liquids.  At end of study, pt began crying and took large cup drinks and began penetration on thin, nectar, and honey thick liquds.  Penetration of thin was transient, but penetration of nectar and honey remained in laryngeal vestibule.  Patient unable to clear with cued cough.  Mild pharyngeal residue  noted.  Swallow characterized by premature spillage, mistiming, and reduced hyolaryngeal complex strength.  Adequate mastication solids with spillage to valleculae and pyriforms.   SLP Findings: Patient presents with mild to moderate oropharyngeal dysphagia.   Comments: Feel fatigue, alertness, and pt compliance with recommended strategies will be important factors in pt safety of po.  Recommendations: Diet Textures: thin liquid, mechanical soft consistency with no mixed textures food. Medications should be taken whole or crushed with puree.   If pt unable to comply with small cup sips of liquid  and s/s aspiration noted bedside, will need to increase liquids to pudding thick.  (Nectar and honey thick consistencies felt to place pt at highest risk due to inability to clear penetrated material)  Recommended Strategies: Upright for PO, small bites and sips, no straw, alternate liquids and solids and supervision with all PO to assure small cup sips of liquid. Oral care before breakfast, after all meals and PRN.  Dysphagia therapy is recommended. Rationale: improve safety of swallow.        Visit Dx:     ICD-10-CM ICD-9-CM   1. Abnormal MRI, lumbar spine R93.7 793.7     Patient Active Problem List   Diagnosis   • Sepsis   • Abnormal MRI, lumbar spine   • Diabetes   • Abscess in epidural space of lumbar spine   • Abscess in epidural space of thoracic spine   • Postoperative anemia due to acute blood loss     Past Medical History:   Diagnosis Date   • Drug abuse, IV    • Hepatitis C      Past Surgical History:   Procedure Laterality Date   • THORACIC LAMINECTOMY DECOMPRESSIVE POSTERIOR N/A 1/3/2018    Procedure: T9 through S1 posterior lumbar decompression for epidural abscess.;  Surgeon: Nghia Esparza MD;  Location: [facility];  Service:           SWALLOW EVALUATION (last 72 hours)      Swallow Evaluation       01/04/18 1500                Rehab Evaluation    Document Type evaluation  -SA        Symptoms Noted During/After Treatment none  -SA        General Information    Patient Profile Review yes  -SA        Pertinent History Of Current Problem --   VFSS 12/28 w/ silent asp thin, pen w/o clearance honey  -SA        Current Diet Limitations NPO  -SA        Plans/Goals Discussed With patient  -SA        Barriers to Rehab medically complex  -SA        Clinical Impression    Patient's Goals For Discharge return to regular diet  -SA        SLP Swallowing Diagnosis mild dysphagia  -SA        Rehab Potential/Prognosis, Swallowing good, to achieve stated therapy goals  -SA        Criteria for Skilled  Therapeutic Interventions Met skilled criteria for dysphagia intervention met  -        Therapy Frequency PRN  -        Predicted Duration Therapy Interv (days) until discharge  -        SLP Diet Recommendation IV - mechanical soft, no mixed consistencies;thin liquids  -        Recommended Feeding/Eating Techniques maintain upright posture during/after eating for 30 mins;no straws;small sips/bites  -        SLP Rec. for Method of Medication Administration meds whole in pudding/applesauce;meds crushed in pudding/applesauce  -        Monitor For Signs Of Aspiration cough;gurgly voice;pneumonia;right lower lobe infiltrates  -        Anticipated Discharge Disposition other (see comments)   unknown  -        SLP Communication to Radiology    Summary Statement Pt presents with mild dysphagia caused by fatigue.  Initially no penetration or aspiration with thin or nectar thick liquids.  At end of study, pt began crying and took large cup drinks and began penetration on thin, nectar, and honey thick liquds.  Penetration of thin was transient, but penetration of nectar and honey remained in laryngeal vestibule.  Patient unable to clear with cued cough.  Mild pharyngeal residue  noted.  Swallow characterized by premature spillage, mistiming, and reduced hyolaryngeal complex strength.  Adequate mastication solids with spillage to valleculae and pyriforms.  -          User Key  (r) = Recorded By, (t) = Taken By, (c) = Cosigned By    Initials Name Effective Dates     Marisa Rosario MS [facility]-SLP 04/13/15 -         EDUCATION  The patient has been educated in the following areas:   Dysphagia (Swallowing Impairment).    SLP Recommendation and Plan  SLP Swallowing Diagnosis: mild dysphagia  SLP Diet Recommendation: IV - mechanical soft, no mixed consistencies, thin liquids  Recommended Feeding/Eating Techniques: maintain upright posture during/after eating for 30 mins, no straws, small sips/bites  SLP Rec. for  Method of Medication Administration: meds whole in pudding/applesauce, meds crushed in pudding/applesauce  Monitor For Signs Of Aspiration: cough, gurgly voice, pneumonia, right lower lobe infiltrates     Criteria for Skilled Therapeutic Interventions Met: skilled criteria for dysphagia intervention met  Anticipated Discharge Disposition: other (see comments) (unknown)  Rehab Potential/Prognosis, Swallowing: good, to achieve stated therapy goals  Therapy Frequency: PRN                        IP SLP Goals       01/04/18 1744 01/01/18 1155 12/28/17 0910    Safely Consume Diet    Safely Consume Diet- SLP, Date Established  01/01/18  -OC     Safely Consume Diet- SLP, Time to Achieve by discharge  - by discharge  -OC     Safely Consume Diet- SLP, Outcome goal ongoing  -SA goal ongoing  -OC goal not met  -      12/26/17 1347          Safely Consume Diet    Safely Consume Diet- SLP, Date Established 12/26/17  -      Safely Consume Diet- SLP, Time to Achieve by discharge  -        User Key  (r) = Recorded By, (t) = Taken By, (c) = Cosigned By    Initials Name Provider Type    SA Marisa Rosario MS CCC-SLP Speech and Language Pathologist    OC Sandrita Luna MA,[facility]-SLP Speech and Language Pathologist     Sonia Mazariegos, MS CCC-SLP Speech and Language Pathologist             SLP Outcome Measures (last 72 hours)      SLP Outcome Measures       01/04/18 1700          FCM Scores    Swallowing FCM Score 4  -        User Key  (r) = Recorded By, (t) = Taken By, (c) = Cosigned By    Initials Name Effective Dates    SA Marisa Rosario MS CCC-SLP 04/13/15 -            Time Calculation:         Time Calculation- SLP       01/04/18 1746          Time Calculation- [facility]    SLP Start Time 1400  -      SLP Stop Time 1515  -      SLP Time Calculation (min) 75 min  -      SLP Received On 01/04/18  -        User Key  (r) = Recorded By, (t) = Taken By, (c) = Cosigned By    Initials Name Provider Type    SA Marisa Rosario MS  CCC-SLP Speech and Language Pathologist          Therapy Charges for Today     Code Description Service Date Service Provider Modifiers Qty    [id number] HC ST MOTION FLUORO EVAL SWALLOW 5 1/4/2018 Marisa Rosario MS CCC-SLP GN 1               Marisa Rosario MS CCC-SLP  1/4/2018

## 2018-01-04 NOTE — PROGRESS NOTES
"  ENDOCRINE    Subjective   AND PLANS  Sam Barrett is a 57 y.o. male.     Follow-up diabetes.  Diet has been upgraded to mechanical soft diet.  No nausea or vomiting.  Fasting glucose 141.  Random glucose 136-146.  Patient did not receive metformin and glipizide yesterday while in surgery.  Continue glipizide 10 mg once a day and metformin 500 mg twice a day plus NovoLog as needed.    Objective   /74  Pulse 109  Temp 99.6 °F (37.6 °C)  Resp 16  Ht 195.6 cm (77.01\")  Wt 119 kg (261 lb 11.2 oz)  SpO2 90%  BMI 31.03 kg/m2  Physical Exam    Awake, alert, not in distress.  No rales or wheezes.  Regular heart rate and rhythm.  No gallop.  Abdomen soft, nontender.  Right upper extremity swelling about the same.  No cyanosis or clubbing    Lab Results (last 24 hours)     Procedure Component Value Units Date/Time    Hemoglobin & Hematocrit, Blood [[id number]]  (Abnormal) Collected:  01/03/18 2013    Specimen:  Blood Updated:  01/03/18 2104     Hemoglobin 9.4 (L) g/dL      Hematocrit 30.0 (L) %     POC Glucose Once [[id number]]  (Abnormal) Collected:  01/03/18 2126    Specimen:  Blood Updated:  01/03/18 2127     Glucose 192 (H) mg/dL     Narrative:       Meter: [id number] : [id number] Abhishek Chen    Tissue Pathology Exam - Tissue, Spine, Thoracic [[id number]] Collected:  01/03/18 1710    Specimen:  Tissue from Spine, Thoracic Updated:  01/03/18 2133    C-reactive Protein [[id number]]  (Abnormal) Collected:  01/04/18 0527    Specimen:  Blood Updated:  01/04/18 0610     C-Reactive Protein 10.04 (H) mg/dL     Basic Metabolic Panel [[id number]]  (Abnormal) Collected:  01/04/18 0527    Specimen:  Blood Updated:  01/04/18 0612     Glucose 147 (H) mg/dL      BUN 24 (H) mg/dL      Creatinine 1.18 mg/dL      Sodium 133 (L) mmol/L      Potassium 5.0 mmol/L      Chloride 97 (L) mmol/L      CO2 24.0 mmol/L      Calcium 9.0 mg/dL      eGFR Non African Amer 64 mL/min/1.73      BUN/Creatinine Ratio 20.3     Anion Gap 12.0 mmol/L  "    Narrative:       GFR Normal >60  Chronic Kidney Disease <60  Kidney Failure <15    CBC & Differential [[id number]] Collected:  01/04/18 0527    Specimen:  Blood Updated:  01/04/18 0618    Narrative:       The following orders were created for panel order CBC & Differential.  Procedure                               Abnormality         Status                     ---------                               -----------         ------                     Scan Slide[[id number]]                                                                  CBC Auto Differential[[id number]]        Abnormal            Final result                 Please view results for these tests on the individual orders.    CBC Auto Differential [[id number]]  (Abnormal) Collected:  01/04/18 0527    Specimen:  Blood Updated:  01/04/18 0618     WBC 10.31 10*3/mm3      RBC 3.15 (L) 10*6/mm3      Hemoglobin 9.3 (L) g/dL      Hematocrit 29.7 (L) %      MCV 94.3 fL      MCH 29.5 pg      MCHC 31.3 (L) g/dL      RDW 14.3 %      RDW-SD 48.7 fl      MPV 10.5 fL      Platelets 367 10*3/mm3      Neutrophil % 71.3 %      Lymphocyte % 18.3 (L) %      Monocyte % 9.6 %      Eosinophil % 0.1 (L) %      Basophil % 0.2 %      Immature Grans % 0.5 %      Neutrophils, Absolute 7.35 10*3/mm3      Lymphocytes, Absolute 1.89 10*3/mm3      Monocytes, Absolute 0.99 10*3/mm3      Eosinophils, Absolute 0.01 10*3/mm3      Basophils, Absolute 0.02 10*3/mm3      Immature Grans, Absolute 0.05 (H) 10*3/mm3      nRBC 0.0 /100 WBC     POC Glucose Once [[id number]]  (Abnormal) Collected:  01/04/18 0629    Specimen:  Blood Updated:  01/04/18 0630     Glucose 167 (H) mg/dL     Narrative:       Meter: [id number] : [id number] Jose Armando ABREU    Wound Culture - Wound, Spine, Thoracic [[id number]]  (Normal) Collected:  01/03/18 1639    Specimen:  Wound from Spine, Thoracic Updated:  01/04/18 0656     Wound Culture No growth at less than 24 hours     Gram Stain Result Moderate (3+) Red blood  cells      Rare (1+) WBCs seen      Occasional Gram positive cocci    Tissue / Bone Culture - Tissue, Spine, Thoracic [[id number]]  (Normal) Collected:  01/03/18 1656    Specimen:  Tissue from Spine, Thoracic Updated:  01/04/18 0921     Tissue Culture No growth at 24 hours     Gram Stain Result Rare (1+) WBCs seen      No organisms seen    Tissue / Bone Culture - Tissue, Spine, Thoracic [[id number]]  (Normal) Collected:  01/03/18 1642    Specimen:  Tissue from Spine, Thoracic Updated:  01/04/18 0921     Tissue Culture No growth at 24 hours     Gram Stain Result Moderate (3+) Red blood cells      Rare (1+) WBCs seen      No organisms seen    AFB Culture - Tissue, Spine, Thoracic [[id number]] Collected:  01/03/18 1656    Specimen:  Tissue from Spine, Thoracic Updated:  01/04/18 1009     AFB Stain No acid fast bacilli seen on direct smear    AFB Culture - Tissue, Spine, Thoracic [[id number]] Collected:  01/03/18 1642    Specimen:  Tissue from Spine, Thoracic Updated:  01/04/18 1010     AFB Stain No acid fast bacilli seen on direct smear    POC Glucose Once [[id number]]  (Abnormal) Collected:  01/04/18 1111    Specimen:  Blood Updated:  01/04/18 1113     Glucose 221 (H) mg/dL     Narrative:       Meter: [id number] : [id number] Jose Juan Jose    HCV RNA, PCR, Qualitative [[id number]] Collected:  01/04/18 1151    Specimen:  Blood Updated:  01/04/18 1159    Hepatitis B Surface Antigen [[id number]]  (Normal) Collected:  01/04/18 1151    Specimen:  Blood Updated:  01/04/18 1239     Hepatitis B Surface Ag Non-Reactive    HIV-1 / O / 2 Ag / Antibody 4th Generation [[id number]]  (Normal) Collected:  01/04/18 1151    Specimen:  Blood Updated:  01/04/18 1243     HIV-1/ HIV-2 Non-Reactive     HIV-1 P24 Ag Screen Non-Reactive    Needle Stick Pt Source [[id number]] Collected:  01/04/18 1151    Specimen:  Blood Updated:  01/04/18 1248    Narrative:       The following orders were created for panel order Needle Stick Pt Source.  Procedure                                Abnormality         Status                     ---------                               -----------         ------                     HCV RNA, PCR, Qualitative[[id number]]                        In process                 Hepatitis B Surface Antigen[[id number]]  Normal              Final result               HIV-1 / O / 2 Ag / Antib...[[id number]]  Normal              Final result               Hepatitis C Antibody[[id number]]         Abnormal            Final result                 Please view results for these tests on the individual orders.    Hepatitis C Antibody [[id number]]  (Abnormal) Collected:  01/04/18 1151    Specimen:  Blood Updated:  01/04/18 1248     Hepatitis C Ab Reactive (C)    CBC (No Diff) [[id number]]  (Abnormal) Collected:  01/04/18 1246    Specimen:  Blood Updated:  01/04/18 1314     WBC 9.33 10*3/mm3      RBC 3.12 (L) 10*6/mm3      Hemoglobin 9.4 (L) g/dL      Hematocrit 29.8 (L) %      MCV 95.5 fL      MCH 30.1 pg      MCHC 31.5 (L) g/dL      RDW 16.4 (H) %      RDW-SD 51.9 fl      MPV 11.5 fL      Platelets 316 10*3/mm3     POC Glucose Once [[id number]]  (Abnormal) Collected:  01/04/18 1520    Specimen:  Blood Updated:  01/04/18 1521     Glucose 169 (H) mg/dL     Narrative:       Meter: [id number] : [id number] Jose Juan Jose            Principal Problem:    Sepsis  Active Problems:    Abnormal MRI, lumbar spine    Diabetes    Abscess in epidural space of lumbar spine    Abscess in epidural space of thoracic spine    Postoperative anemia due to acute blood loss    Type 2 diabetes mellitus without complication    Continue metformin, glipizide, and NovoLog as needed.

## 2018-01-04 NOTE — THERAPY RE-EVALUATION
Acute Care - Physical Therapy Re-Evaluation  [facility]     Patient Name: Sam Barrett  : 1960  MRN: [id number]  Today's Date: 2018   Onset of Illness/Injury or Date of Surgery Date: 17            Admit Date: 2017     Visit Dx:    ICD-10-CM ICD-9-CM   1. Abnormal MRI, lumbar spine R93.7 793.7     Patient Active Problem List   Diagnosis   • Sepsis   • Abnormal MRI, lumbar spine   • Diabetes   • Abscess in epidural space of lumbar spine   • Abscess in epidural space of thoracic spine   • Postoperative anemia due to acute blood loss     Past Medical History:   Diagnosis Date   • Drug abuse, IV    • Hepatitis C      Past Surgical History:   Procedure Laterality Date   • THORACIC LAMINECTOMY DECOMPRESSIVE POSTERIOR N/A 1/3/2018    Procedure: T9 through S1 posterior lumbar decompression for epidural abscess.;  Surgeon: Nghia Esparza MD;  Location: [facility] OR;  Service:           PT ASSESSMENT (last 72 hours)      PT Evaluation       18 1647 18 0957    Rehab Evaluation    Document Type re-evaluation  -     Subjective Information agree to therapy;complains of;pain  -     Patient Effort, Rehab Treatment good  -     Symptoms Noted During/After Treatment none  -CH     General Information    Onset of Illness/Injury or Date of Surgery Date 17  -     General Observations supine in bed, no acute distress noted at rest  -CH     Pertinent History Of Current Problem pt is POD1 lumbar decompression for epidural abscess  -     Precautions/Limitations fall precautions  -     Prior Level of Function --   see initial eval   -     Plans/Goals Discussed With patient  -     Barriers to Rehab medically complex  -     Clinical Impression    Patient/Family Goals Statement to return to [facility]  -     Criteria for Skilled Therapeutic Interventions Met treatment indicated  -     Impairments Found (describe specific impairments) gait, locomotion, and balance;muscle performance   "-     Rehab Potential good, to achieve stated therapy goals  -     Pain Assessment    Pain Assessment 0-10  -     Pain Score 8  -     Pain Type Surgical pain  -     Pain Location Back  -     Pain Intervention(s) Repositioned  -     Cognitive Assessment/Intervention    Current Cognitive/Communication Assessment functional  -     Orientation Status oriented to;person   confused about events from earlier today  -     Follows Commands/Answers Questions 100% of the time  -     Personal Safety WNL/WFL  -     Personal Safety Interventions fall prevention program maintained;nonskid shoes/slippers when out of bed  -     ROM (Range of Motion)    General ROM no range of motion deficits identified  -     MMT (Manual Muscle Testing)    General MMT Assessment --   generalized weakness noted with functional mobility  -     Muscle Tone Assessment    Muscle Tone Assessment  Bilateral Upper Extremities;Bilateral Lower Extremities  -RM    Bilateral Upper Extremities Muscle Tone Assessment  moderately decreased tone  -RM    Bilateral Lower Extremities Muscle Tone Assessment  moderately decreased tone  -RM    Bed Mobility, Assessment/Treatment    Bed Mob, Supine to Sit, [place] verbal cues required;nonverbal cues required (demo/gesture);moderate assist (50% patient effort);2 person assist required  -     Bed Mob, Sit to Supine, [place] verbal cues required;nonverbal cues required (demo/gesture);moderate assist (50% patient effort);2 person assist required  -     Bed Mobility, Comment log rolling technique utilized  -     Transfer Assessment/Treatment    Transfers, Sit-Stand [place] other (see comments)   when encouraged to try standing, pt states \"not today\"  -     Motor Skills/Interventions    Additional Documentation Balance Skills Training (Group)  -     Balance Skills Training    Sitting-Level of Assistance Minimum assistance  -     Sitting # of Minutes 2  -     Therapy " Exercises    Bilateral Lower Extremities AROM:;10 reps;ankle pumps/circles   pt states unable to do LAQ due to pain  -CH     Positioning and Restraints    Pre-Treatment Position in bed  -CH     Post Treatment Position bed  -CH     In Bed supine;call light within reach;encouraged to call for assist;with family/caregiver  -       01/03/18 0200 01/02/18 2010    Muscle Tone Assessment    Muscle Tone Assessment Bilateral Upper Extremities;Bilateral Lower Extremities  -AK Bilateral Upper Extremities;Bilateral Lower Extremities  -AK    Bilateral Upper Extremities Muscle Tone Assessment moderately decreased tone  -AK moderately decreased tone  -AK    Bilateral Lower Extremities Muscle Tone Assessment moderately decreased tone  -AK moderately decreased tone  -AK      01/02/18 1525       Rehab Evaluation    Document Type therapy note (daily note)  -EJ     Subjective Information agree to therapy;complains of;pain  -EJ     Patient Effort, Rehab Treatment adequate  -EJ     Symptoms Noted During/After Treatment increased pain  -EJ     General Information    Precautions/Limitations fall precautions  -EJ     Pain Assessment    Pain Assessment 0-10  -EJ     Pain Score 7  -EJ     Pain Location Back  -EJ     Cognitive Assessment/Intervention    Current Cognitive/Communication Assessment functional  -EJ     Bed Mobility, Assessment/Treatment    Bed Mob, Supine to Sit, [place] verbal cues required;nonverbal cues required (demo/gesture);moderate assist (50% patient effort);maximum assist (25% patient effort);2 person assist required  -EJ     Bed Mob, Sit to Supine, [place] verbal cues required;nonverbal cues required (demo/gesture);moderate assist (50% patient effort);maximum assist (25% patient effort);2 person assist required  -EJ     Transfer Assessment/Treatment    Transfers, Bed-Chair [place] unable to perform;not appropriate to assess  -EJ     Gait Assessment/Treatment    Gait, [place] Level unable to  perform;not appropriate to assess  -EJ     Balance Skills Training    Sitting-Level of Assistance Close supervision  -EJ     Sitting-Balance Support Feet supported;Right upper extremity supported;Left upper extremity supported  -EJ     Sitting # of Minutes 3   pt states his R foot is starting to swell & needs to lay katharina  -EJ     Positioning and Restraints    Pre-Treatment Position in bed  -EJ     Post Treatment Position bed  -EJ     In Bed notified nsg;supine;call light within reach;encouraged to call for assist;exit alarm on  -EJ       User Key  (r) = Recorded By, (t) = Taken By, (c) = Cosigned By    Initials Name Provider Type     Sheridan Ko, PT Physical Therapist    ESSENCE Forman, RN Registered Nurse    RYLAND Farias, PT Physical Therapist    AK Roxana Abrams, RN Registered Nurse          Physical Therapy Education     Title: PT OT SLP Therapies (Done)     Topic: Physical Therapy (Done)     Point: Mobility training (Done)    Learning Progress Summary    Learner Readiness Method Response Comment Documented by Status   Patient Acceptance E,TB,D VU,NR   01/04/18 1700 Done    Acceptance E,D VU,NR   01/02/18 1549 Done    Acceptance D VU,NR   12/31/17 1017 Done    Acceptance E VU  MA 12/29/17 1034 Done    Acceptance E,D NR,VU   12/28/17 1330 Done    Acceptance E,TB,D VU,NR   12/27/17 1617 Done    Acceptance E,TB,D VU,NR   12/26/17 1303 Done               Point: Home exercise program (Done)    Learning Progress Summary    Learner Readiness Method Response Comment Documented by Status   Patient Acceptance E,TB,D VU,NR   01/04/18 1700 Done    Acceptance D VU,NR   12/31/17 1017 Done    Acceptance E,TB,D VU,NR   12/26/17 1303 Done               Point: Body mechanics (Done)    Learning Progress Summary    Learner Readiness Method Response Comment Documented by Status   Patient Acceptance E,TB,D VU,NR   01/04/18 1700 Done    Acceptance E,D VU,NR   01/02/18 1549 Done    Acceptance D  VU,NR   12/31/17 1017 Done    Acceptance E VU  MA 12/29/17 1034 Done    Acceptance E,D NR,VU   12/28/17 1330 Done    Acceptance E,TB,D VU,NR   12/27/17 1617 Done    Acceptance E,TB,D VU,NR   12/26/17 1303 Done               Point: Precautions (Done)    Learning Progress Summary    Learner Readiness Method Response Comment Documented by Status   Patient Acceptance E,TB,D VU,NR   01/04/18 1700 Done    Acceptance D VU,NR   12/31/17 1017 Done    Acceptance E VU  MA 12/29/17 1034 Done    Acceptance E,TB,D VU,NR   12/27/17 1617 Done    Acceptance E,TB,D VU,NR   12/26/17 1303 Done                      User Key     Initials Effective Dates Name Provider Type Discipline     05/18/15 -  Marianela Pedro, PT Physical Therapist PT     12/01/15 -  Sheridan Ko, PT Physical Therapist PT     04/21/17 -  Dana Farias, PT Physical Therapist PT    MA 12/13/16 -  Mariann Vang, PT Physical Therapist PT                PT Recommendation and Plan  Anticipated Discharge Disposition: skilled nursing facility  Planned Therapy Interventions: balance training, bed mobility training, gait training, home exercise program, patient/family education, transfer training  PT Frequency: daily  Plan of Care Review  Plan Of Care Reviewed With: patient  Outcome Summary/Follow up Plan: Pt presents with impaired functional mobility and gait secondary to generalized weakness, pain, and decreased activity tolerance s/p back surgery. Pt may benefit from skilled PT to address strength, mobility, and functional independence.          IP PT Goals       01/04/18 1700 12/26/17 1303       Bed Mobility PT LTG    Bed Mobility PT LTG, Time to Achieve 1 wk  -CH 1 wk  -CH     Bed Mobility PT LTG, Activity Type all bed mobility  -CH all bed mobility  -CH     Bed Mobility PT LTG, [place] Level minimum assist (75% patient effort)  -CH contact guard assist  -CH     Transfer Training PT LTG    Transfer Training PT LTG, Time to  Achieve 1 wk  -CH 1 wk  -CH     Transfer Training PT LTG, Activity Type all transfers  -CH all transfers  -CH     Transfer Training PT LTG, [place] Level minimum assist (75% patient effort)  -CH contact guard assist  -CH     Transfer Training PT LTG, Assist Device walker, rolling  -CH      Gait Training PT LTG    Gait Training Goal PT LTG, Time to Achieve 1 wk  -CH 1 wk  -CH     Gait Training Goal PT LTG, [place] Level minimum assist (75% patient effort)  -CH contact guard assist  -CH     Gait Training Goal PT LTG, Assist Device walker, rolling  -CH      Gait Training Goal PT LTG, Distance to Achieve 30  -  -CH       User Key  (r) = Recorded By, (t) = Taken By, (c) = Cosigned By    Initials Name Provider Type    DAVY Ko, PT Physical Therapist                Outcome Measures       01/04/18 1700 01/02/18 1500       How much help from another person do you currently need...    Turning from your back to your side while in flat bed without using bedrails? 2  - 2  -EJ     Moving from lying on back to sitting on the side of a flat bed without bedrails? 2  - 2  -EJ     Moving to and from a bed to a chair (including a wheelchair)? 1  - 1  -EJ     Standing up from a chair using your arms (e.g., wheelchair, bedside chair)? 1  - 1  -EJ     Climbing 3-5 steps with a railing? 1  - 1  -EJ     To walk in hospital room? 1  - 1  -EJ     AM-PAC 6 Clicks Score 8  - 8  -EJ     Functional Assessment    Outcome Measure Options AM-PAC 6 Clicks Basic Mobility (PT)  -CH AM-PAC 6 Clicks Basic Mobility (PT)  -EJ       User Key  (r) = Recorded By, (t) = Taken By, (c) = Cosigned By    Initials Name Provider Type    DAVY Ko, PT Physical Therapist    EJ Dana Farias, PT Physical Therapist           Time Calculation:         PT Charges       01/04/18 1703          Time Calculation    Start Time 1631  -      Stop Time 1645  -      Time Calculation (min) 14 min  -      PT Received On  01/04/18  -      PT - Next Appointment 01/05/18  -      PT Goal Re-Cert Due Date 01/11/18  -        User Key  (r) = Recorded By, (t) = Taken By, (c) = Cosigned By    Initials Name Provider Type    DAVY Ko PT Physical Therapist          Therapy Charges for Today     Code Description Service Date Service Provider Modifiers Qty    31169221468 HC PT RE-EVAL ESTABLISHED PLAN 2 1/4/2018 Sheridan Ko, PT GP 1    31829473681 HC PT THER PROC EA 15 MIN 1/4/2018 Sheridan Ko, PT GP 1    36054019709 HC PT THER SUPP EA 15 MIN 1/4/2018 Sheridan Ko, PT GP 1          PT G-Codes  Outcome Measure Options: AM-PAC 6 Clicks Basic Mobility (PT)      Sheridan Ko, PT  1/4/2018

## 2018-01-04 NOTE — PROGRESS NOTES
Name: Sam Barrett ADMIT: 2017   : 1960  PCP: Casey Verduzco Jr., MD    MRN: [id number] LOS: 12 days   AGE/SEX: 57 y.o. male  ROOM: [facility]     Subjective     HPI: 57 y.o. male postop day #1 status post T9 through L1 lumbar laminectomies and decompression for epidural abscess.  Patient is overall doing reasonably well.  He is in the intensive care unit overnight.    Review of Systems   Constitutional: Positive for fatigue.   HENT: Positive for sore throat.    Musculoskeletal: Positive for arthralgias and gait problem.   All other systems reviewed and are negative.      Objective     Vital Signs  Temp:  [98 °F (36.7 °C)-98.6 °F (37 °C)] 98.1 °F (36.7 °C)  Heart Rate:  [] 111  Resp:  [14-20] 18  BP: ()/() 107/56  Arterial Line BP: (106-136)/(66-96) 123/66         Physical Exam   Constitutional: He appears ill.   Eyes: Pupils are equal, round, and reactive to light.   Cardiovascular: Normal rate and regular rhythm.    Pulmonary/Chest: Effort normal. No respiratory distress. He has no wheezes.   Abdominal: Soft.   Musculoskeletal: He exhibits edema.   Skin: There is erythema.   Psychiatric: He has a normal mood and affect. His behavior is normal.     Vascular: Right upper arm has stable erythema.  No fluctuance.  Right foot also unchanged.    CBC    Results from last 7 days  Lab Units 18  0727 18  0821 17  0604 17  0536 17  0612   WBC 10*3/mm3 10.31  --  9.35 8.47 9.60 11.49* 10.34 10.04   HEMOGLOBIN g/dL 9.3* 9.4* 11.5* 11.1* 11.4* 11.4* 12.0* 12.2*   PLATELETS 10*3/mm3 367  --  343 327 331 310 264 225     BMP   Results from last 7 days  Lab Units 18  0608 17  0536 1712   SODIUM mmol/L 133* 134* 139 139  --    POTASSIUM mmol/L 5.0 4.0 4.0 4.2  --    CHLORIDE mmol/L 97* 98 103 103  --    CO2 mmol/L 24.0 26.6 26.1 24.5  --    BUN mg/dL 24* 14 15 16  --     CREATININE mg/dL 1.18 0.58* 0.45* 0.54* 0.46*   GLUCOSE mg/dL 147* 151* 132* 145*  --      Micro   Results from last 7 days  Lab Units 18  1656 18  1642 18  1639   WOUNDCX   --   --  No growth at less than 24 hours   GRAM STAIN RESULT  Rare (1+) WBCs seen  No organisms seen Moderate (3+) Red blood cells  Rare (1+) WBCs seen  No organisms seen Moderate (3+) Red blood cells  Rare (1+) WBCs seen  Occasional Gram positive cocci         [place], Subsequent Hospital Care      Assessment/Plan     Active Hospital Problems (** Indicates Principal Problem)    Diagnosis Date Noted   • **Sepsis [A41.9] 2017   • Postoperative anemia due to acute blood loss [D62] 2018   • Abscess in epidural space of lumbar spine [G06.1] 2018   • Abscess in epidural space of thoracic spine [G06.1] 2018   • Abnormal MRI, lumbar spine [R93.7] 2017   • Diabetes [E11.9] 2017      Resolved Hospital Problems    Diagnosis Date Noted Date Resolved   No resolved problems to display.        Assessment & Plan  57 y.o. male with epidural abscess related to IV drug use.  Repeat duplex scan of the right upper extremity last night does not show STP surprisingly.  Image above but the cephalic vein is clearly compressible.  There is a significant amount of subcutaneous edema in the erythematous area but no fluid collection noted.    If this continues to coalesce or if a fluid collection develops or becomes fluctuant than I would recommend soft tissue excisional debridement.  However, for now, I recommend ongoing antibiotic therapy.      Baldemar Fuller MD  18  9:45 AM  Office Number [phone]

## 2018-01-04 NOTE — PROGRESS NOTES
GARY doppler completed.  Preliminary results:  RT arm is negative for DVT.  Results given to DELIO Chen.

## 2018-01-04 NOTE — NURSING NOTE
Maya remy notified of right upper arm swelling, warm to touch and extreme pain with minimal touch.  Arm elevated, warm compress applied and doppler ordered to rule out DVT

## 2018-01-04 NOTE — PROGRESS NOTES
Chacho Kam MD                          [phone]      Patient ID:    Name:  Sam Barrett    MRN:  [id number]    1960   57 y.o.  male            Patient Care Team:  Casey Verduzco Jr., MD as PCP - General (Family Medicine)    LOS: 12  INTERVAL:  Follow up sepsis, group B strep septicemia, epidural abscess, infection of cephalic vein, IVDU, New onset DM, HCV  Chart reviewed, EMAR reviewed.  Patient not seen yesterday all day was in OR  Status post T9 through S1 posterior lumbar decompression of epidural abscess.  Extubated postop and now in ICU.  This morning complains of severe back pain.  Awake alert.  Denies any shortness of breath or cough.      Objective     Vital Signs past 24hrs    BP range: BP: ()/() 107/56  Pulse range: Heart Rate:  [] 98  Resp rate range: Resp:  [14-20] 20  Temp range: Temp (24hrs), Av.3 °F (36.8 °C), Min:98 °F (36.7 °C), Max:98.6 °F (37 °C)    O2 Device: nasal cannula       119 kg (261 lb 11.2 oz); Body mass index is 31.03 kg/(m^2).    Intake/Output Summary (Last 24 hours) at 18 0830  Last data filed at 18 0544   Gross per 24 hour   Intake             4153 ml   Output             2865 ml   Net             1288 ml     Exam:  GENERAL:  NAD, [id number] to be in moderate to severe pain   HEENT:  EOMI, nonicteric sclera, no JVD  PULMONARY:    No resp distress CTAB, no wheeze, no crackles, no rhonchi, symmetric air entry  CARDIAC:  RRR no MRG, S1 S2  ABD: SNTND BS+  EXT: mild edematous right le warm 1+ pitting edema, left lower extremity with distal pulses intact  and RUE with good rom, minimal edema strength 5/5 symmetric today, pulses symmetric 2+   NEURO:  CNs II-XII intact, no focal deficits  SKIN: as above  PSYCH: appropriate mood    Scheduled meds:      atorvastatin 40 mg Oral Daily   enoxaparin 40 mg Subcutaneous Q24H   glipiZIDE 10 mg Oral QAM AC   insulin aspart 2-5 Units Subcutaneous 4x Daily AC & at Bedtime    ipratropium-albuterol 3 mL Nebulization 4x Daily - RT   metFORMIN 500 mg Oral BID With Meals   metoprolol tartrate 50 mg Oral Q8H   Morphine 30 mg Oral Q12H   penicillin g (potassium) 4 Million Units Intravenous Q4H                     lactated ringers 100 mL/hr    lactated ringers 9 mL/hr Last Rate: 9 mL/hr (01/03/18 1308)   lactated ringers 100 mL/hr    lactated ringers 75 mL/hr Last Rate: 75 mL/hr (01/03/18 2200)       Data Review:      Results from last 7 days  Lab Units 01/04/18  0527 01/03/18 2013 01/03/18  0608 01/02/18  0727  12/30/17  0536 12/29/17 1959   SODIUM mmol/L 133*  --  134*  --   --  139 139   POTASSIUM mmol/L 5.0  --  4.0  --   --  4.0 4.2   CHLORIDE mmol/L 97*  --  98  --   --  103 103   CO2 mmol/L 24.0  --  26.6  --   --  26.1 24.5   BUN mg/dL 24*  --  14  --   --  15 16   CREATININE mg/dL 1.18  --  0.58*  --   --  0.45* 0.54*   CALCIUM mg/dL 9.0  --  8.8  --   --  8.3* 8.4*   BILIRUBIN mg/dL  --   --  1.0  --   --   --  0.8   ALK PHOS U/L  --   --  91  --   --   --  75   ALT (SGPT) U/L  --   --  42*  --   --   --  41   AST (SGOT) U/L  --   --  37  --   --   --  29   GLUCOSE mg/dL 147*  --  151*  --   --  132* 145*   WBC 10*3/mm3 10.31  --  9.35 8.47  < > 10.34  --    HEMOGLOBIN g/dL 9.3* 9.4* 11.5* 11.1*  < > 12.0*  --    PLATELETS 10*3/mm3 367  --  343 327  < > 264  --    INR   --   --   --   --   --  1.25*  --    PROCALCITONIN ng/mL  --   --   --   --   --  0.29*  --    < > = values in this interval not displayed.    Lab Results   Component Value Date    CALCIUM 9.0 01/04/2018       Results from last 7 days  Lab Units 01/03/18  1639   WOUNDCX  No growth at less than 24 hours   GRAM STAIN RESULT  Moderate (3+) Red blood cells  Rare (1+) WBCs seen  Occasional Gram positive cocci         Results Review:    I have reviewed the available laboratory results and reviewed the chest imaging personally    ASSESSMENT:   Acute hypoxic respiratory failure resolved   Strep septicemia/bacteremia    Right Cephalic vein Pylephlebitis  History of IV drug abuse   Cellulitis lower extremity   New onset diabetes mellitus  Chronic narcotic dependence/ abuse  Intermittent SVT   Hepatitis C  Epidural abscess    PLAN:  Postop pain control.  Remains hemodynamically stable at this time  Discontinue A-line.    Continue supportive care   Antibiotics managed by infectious diseases. - appreciate assistance  Insulin per endocrinology  Access/psychiatry  [facility] noted    transfer out of the ICU   Dispo pending NSG intervention and recovering    DVT ppx   Full Code     Chacho Kam MD  1/4/2018

## 2018-01-04 NOTE — PROGRESS NOTES
"Postoperative day #1 status post T9 through L1 lumbar laminectomies and decompression of epidural abscess.    The patient states he has a lot of back pain.  He is very tired.  He feels as if he has \"beat up\".    Clinically the patient is stable.  He is getting a PCA  We will transition him to oral narcotic medication.  We are hopeful to get him up and walking certainly sitting up by the bedside.    His vital signs are reasonably stable    /56  Pulse 98  Temp 98.1 °F (36.7 °C)  Resp 20  Ht 195.6 cm (77.01\")  Wt 119 kg (261 lb 11.2 oz)  SpO2 99%  BMI 31.03 kg/m2    His hematocrit dropped to points to about 9.3.  We'll repeat his hematocrit in about 45 hours.    Motor strength is still 5 out of 5 in the lower extremities.  He remains with a Petit catheter in place.    Plan: Increase activity, continue drains, observe for anemia-postoperative anemia.  "

## 2018-01-05 LAB
CYTO UR: NORMAL
GLUCOSE BLDC GLUCOMTR-MCNC: 130 MG/DL (ref 70–130)
GLUCOSE BLDC GLUCOMTR-MCNC: 69 MG/DL (ref 70–130)
GLUCOSE BLDC GLUCOMTR-MCNC: 76 MG/DL (ref 70–130)
LAB AP CASE REPORT: NORMAL
Lab: NORMAL
PATH REPORT.FINAL DX SPEC: NORMAL
PATH REPORT.GROSS SPEC: NORMAL

## 2018-01-05 PROCEDURE — 94799 UNLISTED PULMONARY SVC/PX: CPT

## 2018-01-05 PROCEDURE — 97110 THERAPEUTIC EXERCISES: CPT

## 2018-01-05 PROCEDURE — 25010000002 ENOXAPARIN PER 10 MG: Performed by: INTERNAL MEDICINE

## 2018-01-05 PROCEDURE — 99232 SBSQ HOSP IP/OBS MODERATE 35: CPT | Performed by: INTERNAL MEDICINE

## 2018-01-05 PROCEDURE — 99024 POSTOP FOLLOW-UP VISIT: CPT | Performed by: NURSE PRACTITIONER

## 2018-01-05 PROCEDURE — 63710000001 INSULIN ASPART PER 5 UNITS: Performed by: INTERNAL MEDICINE

## 2018-01-05 PROCEDURE — 25010000003 PENICILLIN G POTASSIUM PER 600000 UNITS: Performed by: INTERNAL MEDICINE

## 2018-01-05 PROCEDURE — 82962 GLUCOSE BLOOD TEST: CPT

## 2018-01-05 RX ADMIN — INSULIN ASPART 2.5 UNITS: 100 INJECTION, SOLUTION INTRAVENOUS; SUBCUTANEOUS at 09:13

## 2018-01-05 RX ADMIN — HYDROCODONE BITARTRATE AND ACETAMINOPHEN 1 TABLET: 10; 325 TABLET ORAL at 15:11

## 2018-01-05 RX ADMIN — METOPROLOL TARTRATE 50 MG: 50 TABLET, FILM COATED ORAL at 03:14

## 2018-01-05 RX ADMIN — IPRATROPIUM BROMIDE AND ALBUTEROL SULFATE 3 ML: .5; 3 SOLUTION RESPIRATORY (INHALATION) at 08:45

## 2018-01-05 RX ADMIN — IPRATROPIUM BROMIDE AND ALBUTEROL SULFATE 3 ML: .5; 3 SOLUTION RESPIRATORY (INHALATION) at 16:16

## 2018-01-05 RX ADMIN — GLIPIZIDE 10 MG: 10 TABLET ORAL at 09:04

## 2018-01-05 RX ADMIN — OXYCODONE HYDROCHLORIDE 15 MG: 15 TABLET ORAL at 09:05

## 2018-01-05 RX ADMIN — OXYCODONE HYDROCHLORIDE AND ACETAMINOPHEN 1 TABLET: 10; 325 TABLET ORAL at 04:21

## 2018-01-05 RX ADMIN — IPRATROPIUM BROMIDE AND ALBUTEROL SULFATE 3 ML: .5; 3 SOLUTION RESPIRATORY (INHALATION) at 21:01

## 2018-01-05 RX ADMIN — METOPROLOL TARTRATE 50 MG: 50 TABLET, FILM COATED ORAL at 20:48

## 2018-01-05 RX ADMIN — ENOXAPARIN SODIUM 40 MG: 40 INJECTION SUBCUTANEOUS at 20:48

## 2018-01-05 RX ADMIN — SODIUM CHLORIDE, POTASSIUM CHLORIDE, SODIUM LACTATE AND CALCIUM CHLORIDE 75 ML/HR: 600; 310; 30; 20 INJECTION, SOLUTION INTRAVENOUS at 02:48

## 2018-01-05 RX ADMIN — SODIUM CHLORIDE 4 MILLION UNITS: 0.9 INJECTION INTRAVENOUS at 16:37

## 2018-01-05 RX ADMIN — SODIUM CHLORIDE 4 MILLION UNITS: 0.9 INJECTION INTRAVENOUS at 06:59

## 2018-01-05 RX ADMIN — SODIUM CHLORIDE 4 MILLION UNITS: 0.9 INJECTION INTRAVENOUS at 20:48

## 2018-01-05 RX ADMIN — OXYCODONE HYDROCHLORIDE AND ACETAMINOPHEN 1 TABLET: 10; 325 TABLET ORAL at 13:02

## 2018-01-05 RX ADMIN — SODIUM CHLORIDE 4 MILLION UNITS: 0.9 INJECTION INTRAVENOUS at 12:56

## 2018-01-05 RX ADMIN — METFORMIN HYDROCHLORIDE 500 MG: 500 TABLET ORAL at 09:04

## 2018-01-05 RX ADMIN — OXYCODONE HYDROCHLORIDE AND ACETAMINOPHEN 1 TABLET: 10; 325 TABLET ORAL at 18:44

## 2018-01-05 RX ADMIN — METOPROLOL TARTRATE 50 MG: 50 TABLET, FILM COATED ORAL at 12:56

## 2018-01-05 RX ADMIN — ATORVASTATIN CALCIUM 40 MG: 20 TABLET, FILM COATED ORAL at 09:05

## 2018-01-05 RX ADMIN — METFORMIN HYDROCHLORIDE 500 MG: 500 TABLET ORAL at 16:38

## 2018-01-05 RX ADMIN — SODIUM CHLORIDE 4 MILLION UNITS: 0.9 INJECTION INTRAVENOUS at 02:48

## 2018-01-05 RX ADMIN — IPRATROPIUM BROMIDE AND ALBUTEROL SULFATE 3 ML: .5; 3 SOLUTION RESPIRATORY (INHALATION) at 12:03

## 2018-01-05 NOTE — PROGRESS NOTES
.  Name: Sam Barrett ADMIT: 2017   : 1960  PCP: Casey Verduzco Jr., MD    MRN: [id number] LOS: 13 days   AGE/SEX: 57 y.o. male  ROOM: [facility][note 3]     Subjective     HPI: 57 y.o. male postop day #2 status post T9 through L1 lumbar laminectomies and decompression for epidural abscess.     Review of Systems   Constitutional: Positive for fatigue.   Musculoskeletal: Positive for arthralgias and gait problem.   All other systems reviewed and are negative.      Objective     Vital Signs  Temp:  [97.7 °F (36.5 °C)-99.7 °F (37.6 °C)] 97.7 °F (36.5 °C)  Heart Rate:  [] 85  Resp:  [16-20] 20  BP: ()/(63-74) 97/63    I/O this shift:  In: -   Out: 50 [Other:50]    Physical Exam   Constitutional: He appears ill.   Eyes: Pupils are equal, round, and reactive to light.   Cardiovascular: Normal rate and regular rhythm.    Pulmonary/Chest: Effort normal. No respiratory distress. He has no wheezes.   Abdominal: Soft.   Musculoskeletal: He exhibits edema.   Skin: There is erythema.   Psychiatric: He has a normal mood and affect. His behavior is normal.     Vascular: Right upper arm has stable erythema.  No fluctuance but a little softer than yesterday.  Right foot also unchanged.    CBC      Results from last 7 days  Lab Units 18  1246 18  0527 18  0608 18  0727 18  0821 17  0604 17  0536   WBC 10*3/mm3 9.33 10.31  --  9.35 8.47 9.60 11.49* 10.34   HEMOGLOBIN g/dL 9.4* 9.3* 9.4* 11.5* 11.1* 11.4* 11.4* 12.0*   PLATELETS 10*3/mm3 316 367  --  343 327 331 310 264     BMP     Results from last 7 days  Lab Units 18  0527 18  0608 17  0536 17  1959   SODIUM mmol/L 133* 134* 139 139   POTASSIUM mmol/L 5.0 4.0 4.0 4.2   CHLORIDE mmol/L 97* 98 103 103   CO2 mmol/L 24.0 26.6 26.1 24.5   BUN mg/dL 24* 14 15 16   CREATININE mg/dL 1.18 0.58* 0.45* 0.54*   GLUCOSE mg/dL 147* 151* 132* 145*     Micro     Results from last 7 days  Lab Units  18  1656 18  1642 18  1639   WOUNDCX   --   --    Rare Gram Positive Cocci*   GRAM STAIN RESULT  Rare (1+) WBCs seen  No organisms seen Moderate (3+) Red blood cells  Rare (1+) WBCs seen  No organisms seen Moderate (3+) Red blood cells  Rare (1+) WBCs seen  Occasional Gram positive cocci         [place]illin, Subsequent Hospital Care      Assessment/Plan     Active Hospital Problems (** Indicates Principal Problem)    Diagnosis Date Noted   • **Sepsis [A41.9] 2017   • Postoperative anemia due to acute blood loss [D62] 2018   • Type 2 diabetes mellitus without complication [E11.9] 2018   • Abscess in epidural space of lumbar spine [G06.1] 2018   • Abscess in epidural space of thoracic spine [G06.1] 2018   • Abnormal MRI, lumbar spine [R93.7] 2017   • Diabetes [E11.9] 2017      Resolved Hospital Problems    Diagnosis Date Noted Date Resolved   No resolved problems to display.        Assessment & Plan  57 y.o. male with epidural abscess related to IV drug use.  Repeat duplex scan of the right upper extremity does not show STP surprisingly. Today the area seems less indurated.  However, it is a little bit softer and this area could become something that needs to be drained.  Not currently fluctuant at this time.    We will continue to watch over the next 24-48 hours  Hep C+, IV Drug User    Baldemar Fuller MD  18  1:30 PM  Office Number [phone]

## 2018-01-05 NOTE — PLAN OF CARE
Problem: Patient Care Overview (Adult)  Goal: Plan of Care Review  Outcome: Ongoing (interventions implemented as appropriate)   01/05/18 1524   Coping/Psychosocial Response Interventions   Plan Of Care Reviewed With patient   Outcome Evaluation   Outcome Summary/Follow up Plan Pt able to stand x2 at EOB w/ max encouragement from PT and RN. C/o increased pain throughout activity.

## 2018-01-05 NOTE — PLAN OF CARE
Problem: Skin Integrity Impairment, Risk/Actual (Adult)  Goal: Identify Related Risk Factors and Signs and Symptoms  Outcome: Ongoing (interventions implemented as appropriate)    Goal: Skin Integrity/Wound Healing  Outcome: Ongoing (interventions implemented as appropriate)

## 2018-01-05 NOTE — PLAN OF CARE
Problem: Pain, Acute (Adult)  Goal: Identify Related Risk Factors and Signs and Symptoms  Outcome: Ongoing (interventions implemented as appropriate)   01/05/18 0432   Pain, Acute   Related Risk Factors (Acute Pain) disease process;infection;patient perception;surgery;procedure/treatment;positioning;persistent pain   Signs and Symptoms (Acute Pain) sleep pattern alteration;fatigue/weakness     Goal: Acceptable Pain Control/Comfort Level  Outcome: Ongoing (interventions implemented as appropriate)   01/05/18 9504   Pain, Acute (Adult)   Acceptable Pain Control/Comfort Level making progress toward outcome

## 2018-01-05 NOTE — PROGRESS NOTES
Continued Stay Note  [facility]     Patient Name: Sam Barrett  MRN: [id number]  Today's Date: 1/5/2018    Admit Date: 12/23/2017          Discharge Plan       01/05/18 1546    Case Management/Social Work Plan    Plan Plans are to [facility].  Will need precert prior to dc.               Discharge Codes     None            Cecille Suarez RN

## 2018-01-05 NOTE — PROGRESS NOTES
Adult Nutrition  Assessment/PES    Patient Name:  Sam Barrett  YOB: 1960  MRN: [id number]  Admit Date:  12/23/2017    Assessment Date:  1/5/2018    Comments:  Followed up for education. Patient provided with very basic info on DM diet with handouts but he continues to complain of pain and is asking for nurse because he is unable to urinate. Notified staff.   Left DM diet materials with patient. Will follow up for education needs.            Reason for Assessment       01/05/18 1545    Reason for Assessment    Reason For Assessment/Visit follow up protocol              Nutrition/Diet History       01/05/18 1545    Nutrition/Diet History    Factors Affecting Nutritional Intake Factors    Other C/o continuous pain per nursing notes. Upon visit, patient is asking for nursing stating that he cannot urinate.                         Problem/Interventions:          Problem 2       01/05/18 1546    Nutrition Diagnoses Problem 2    Problem 2 Knowledge Deficit    Etiology (related to) Medical Diagnosis    Endocrine DM    Signs/Symptoms (evidenced by) Demonstrated Information Deficit                  Intervention Goal       01/05/18 1547    Intervention Goal    General Provide information regarding MNT for treatment/condition    Weight Appropriate weight loss            Nutrition Intervention       01/05/18 1547    Nutrition Intervention    RD/Tech Action Follow Tx progress;Care plan reviewd              Education/Evaluation       01/05/18 1547    Education    Education Will Instruct as appropriate;Provided education regarding;Education topics    Provided education regarding Healthy eating for diabetes   BASICS provided today-- patient continues to c/o pain    Monitor/Evaluation    Monitor Per protocol    Education Follow-up Reinforce PRN        Electronically signed by:  Kathryn Vargas RD  01/05/18 3:48 PM

## 2018-01-05 NOTE — PLAN OF CARE
Problem: Patient Care Overview (Adult)  Goal: Plan of Care Review  Outcome: Ongoing (interventions implemented as appropriate)   01/05/18 0433   Coping/Psychosocial Response Interventions   Plan Of Care Reviewed With patient   Patient Care Overview   Progress improving   Outcome Evaluation   Outcome Summary/Follow up Plan VSS. Pt c/o continuous pain. tx with pain meds per MAR. meds given per order. no falls. island drsg intact, clean and dry throughout shift. small amounts of drainage noted to both LION drains. will continue to monitor.      Goal: Discharge Needs Assessment  Outcome: Ongoing (interventions implemented as appropriate)   12/26/17 1448 01/02/18 0356   Discharge Needs Assessment   Concerns To Be Addressed --  basic needs concerns;substance/tobacco abuse/use concerns   Equipment Needed After Discharge none --    Current Discharge Risk dependent with mobility/activities of daily living --    Discharge Disposition still a patient --    Living Environment   Transportation Available family or friend will provide --        Problem: Fall Risk (Adult)  Goal: Absence of Falls  Outcome: Ongoing (interventions implemented as appropriate)   01/05/18 0433   Fall Risk (Adult)   Absence of Falls making progress toward outcome       Problem: Pressure Ulcer Risk (Antonio Scale) (Adult,Obstetrics,Pediatric)  Goal: Skin Integrity  Outcome: Ongoing (interventions implemented as appropriate)   01/05/18 0433   Pressure Ulcer Risk (Antonio Scale) (Adult,Obstetrics,Pediatric)   Skin Integrity making progress toward outcome

## 2018-01-05 NOTE — THERAPY TREATMENT NOTE
Acute Care - Physical Therapy Treatment Note  [facility]     Patient Name: Sam Barrett  : 1960  MRN: [id number]  Today's Date: 2018  Onset of Illness/Injury or Date of Surgery Date: 17          Admit Date: 2017    Visit Dx:    ICD-10-CM ICD-9-CM   1. Abnormal MRI, lumbar spine R93.7 793.7     Patient Active Problem List   Diagnosis   • Sepsis   • Abnormal MRI, lumbar spine   • Diabetes   • Abscess in epidural space of lumbar spine   • Abscess in epidural space of thoracic spine   • Postoperative anemia due to acute blood loss   • Type 2 diabetes mellitus without complication               Adult Rehabilitation Note       18 1500          Rehab Assessment/Intervention    Discipline physical therapy assistant  -RW      Document Type therapy note (daily note)  -RW      Subjective Information agree to therapy;complains of;pain  -RW      Patient Effort, Rehab Treatment fair  -RW      Precautions/Limitations fall precautions;spinal precautions  -RW      Recorded by [RW] Gianna Moore PTA      Pain Assessment    Pain Assessment 0-10  -RW      Pain Score 8  -RW      Pain Type Acute pain;Surgical pain  -RW      Pain Location Back  -RW      Pain Intervention(s) Medication (See MAR);Repositioned;Ambulation/increased activity  -RW      Response to Interventions unchanged  -RW      Recorded by [RW] Gianna Moore PTA      Cognitive Assessment/Intervention    Current Cognitive/Communication Assessment functional  -RW      Orientation Status oriented x 4  -RW      Follows Commands/Answers Questions 100% of the time;needs cueing  -RW      Personal Safety WNL/WFL  -RW      Personal Safety Interventions fall prevention program maintained;nonskid shoes/slippers when out of bed  -RW      Recorded by [RW] Gianna Moore PTA      Bed Mobility, Assessment/Treatment    Bed Mobility, Assistive Device bed rails  -RW      Bed Mobility, Roll Right, [place] moderate assist (50% patient effort);verbal  cues required;nonverbal cues required (demo/gesture)  -RW      Bed Mob, Sidelying to Sit, [place] moderate assist (50% patient effort);2 person assist required;verbal cues required;nonverbal cues required (demo/gesture)  -RW      Bed Mob, Sit to Sidelying, [place] moderate assist (50% patient effort);2 person assist required;verbal cues required;nonverbal cues required (demo/gesture)  -RW      Bed Mobility, Impairments strength decreased;pain  -RW      Bed Mobility, Comment Verbal cueing required for log roll  -RW      Recorded by [RW] Gianna Moore PTA      Transfer Assessment/Treatment    Transfers, Sit-Stand [place] moderate assist (50% patient effort);maximum assist (25% patient effort);2 person assist required;verbal cues required  -RW      Transfers, Stand-Sit [place] moderate assist (50% patient effort);maximum assist (25% patient effort);2 person assist required;verbal cues required  -RW      Transfers, Sit-Stand-Sit, Assist Device rolling walker;elevated surface   Stood x2 at EOB  -RW      Transfer, Impairments strength decreased;pain  -RW      Recorded by [RW] Gianna Moore PTA      Gait Assessment/Treatment    Gait, [place] Level unable to perform  -RW      Recorded by [RW] Gianna Moore PTA      Balance Skills Training    Sitting-Level of Assistance Contact guard  -RW      Sitting-Balance Support Feet supported;Right upper extremity supported;Left upper extremity supported  -RW      Recorded by [RW] Gianna Moore PTA      Positioning and Restraints    Pre-Treatment Position in bed  -RW      Post Treatment Position bed  -RW      In Bed supine;call light within reach;encouraged to call for assist   nsg present during tx  -RW      Recorded by [RW] Gianna Moore PTA        User Key  (r) = Recorded By, (t) = Taken By, (c) = Cosigned By    Initials Name Effective Dates    KIKA Moore PTA 04/06/16 -                 IP PT Goals       01/04/18 1700 12/26/17 6596        Bed Mobility PT LTG    Bed Mobility PT LTG, Time to Achieve 1 wk  -CH 1 wk  -CH     Bed Mobility PT LTG, Activity Type all bed mobility  -CH all bed mobility  -CH     Bed Mobility PT LTG, [place] Level minimum assist (75% patient effort)  -CH contact guard assist  -CH     Transfer Training PT LTG    Transfer Training PT LTG, Time to Achieve 1 wk  -CH 1 wk  -CH     Transfer Training PT LTG, Activity Type all transfers  -CH all transfers  -CH     Transfer Training PT LTG, [place] Level minimum assist (75% patient effort)  -CH contact guard assist  -CH     Transfer Training PT LTG, Assist Device walker, rolling  -CH      Gait Training PT LTG    Gait Training Goal PT LTG, Time to Achieve 1 wk  -CH 1 wk  -CH     Gait Training Goal PT LTG, [place] Level minimum assist (75% patient effort)  -CH contact guard assist  -CH     Gait Training Goal PT LTG, Assist Device walker, rolling  -CH      Gait Training Goal PT LTG, Distance to Achieve 30  -  -CH       User Key  (r) = Recorded By, (t) = Taken By, (c) = Cosigned By    Initials Name Provider Type     Sheridan Ko, PT Physical Therapist          Physical Therapy Education     Title: PT OT SLP Therapies (Done)     Topic: Physical Therapy (Done)     Point: Mobility training (Done)    Learning Progress Summary    Learner Readiness Method Response Comment Documented by Status   Patient Acceptance E,TB,D VU,NR  RW 01/05/18 1525 Done    Acceptance E,TB,D VU,NR   01/04/18 1700 Done    Acceptance E,D VU,NR  EJ 01/02/18 1549 Done    Acceptance D VU,NR   12/31/17 1017 Done    Acceptance E VU  MA 12/29/17 1034 Done    Acceptance E,D NR,VU   12/28/17 1330 Done    Acceptance E,TB,D VU,NR   12/27/17 1617 Done    Acceptance E,TB,D VU,NR   12/26/17 1303 Done               Point: Home exercise program (Done)    Learning Progress Summary    Learner Readiness Method Response Comment Documented by Status   Patient Acceptance E,TB,D VU,NR   01/04/18 1700  Done    Acceptance D VU,NR   12/31/17 1017 Done    Acceptance E,TB,D VU,NR   12/26/17 1303 Done               Point: Body mechanics (Done)    Learning Progress Summary    Learner Readiness Method Response Comment Documented by Status   Patient Acceptance E,TB,D VU,NR   01/05/18 1525 Done    Acceptance E,TB,D VU,NR   01/04/18 1700 Done    Acceptance E,D VU,NR   01/02/18 1549 Done    Acceptance D VU,NR   12/31/17 1017 Done    Acceptance E VU  MA 12/29/17 1034 Done    Acceptance E,D NR,VU   12/28/17 1330 Done    Acceptance E,TB,D VU,NR   12/27/17 1617 Done    Acceptance E,TB,D VU,NR   12/26/17 1303 Done               Point: Precautions (Done)    Learning Progress Summary    Learner Readiness Method Response Comment Documented by Status   Patient Acceptance E,TB,D VU,NR   01/05/18 1525 Done    Acceptance E,TB,D VU,NR   01/04/18 1700 Done    Acceptance D VU,NR   12/31/17 1017 Done    Acceptance E VU  MA 12/29/17 1034 Done    Acceptance E,TB,D VU,NR   12/27/17 1617 Done    Acceptance E,TB,D VU,NR   12/26/17 1303 Done                      User Key     Initials Effective Dates Name Provider Type Discipline     05/18/15 -  Marianela Pedro, PT Physical Therapist PT     12/01/15 -  Sheridan Ko, PT Physical Therapist PT     04/21/17 -  Dana Farias, PT Physical Therapist PT     04/06/16 -  Gianna Moore, PTA Physical Therapy Assistant PT    MA 12/13/16 -  Mariann Vang, PT Physical Therapist PT                    PT Recommendation and Plan  Anticipated Discharge Disposition: skilled nursing facility  Planned Therapy Interventions: balance training, bed mobility training, gait training, home exercise program, patient/family education, transfer training  PT Frequency: daily  Plan of Care Review  Plan Of Care Reviewed With: patient  Outcome Summary/Follow up Plan: Pt able to stand x2 at EOB w/ max encouragement from PT and RN. C/o increased pain throughout activity.            Outcome Measures       01/05/18 1500 01/04/18 1700       How much help from another person do you currently need...    Turning from your back to your side while in flat bed without using bedrails? 2  -RW 2  -CH     Moving from lying on back to sitting on the side of a flat bed without bedrails? 2  -RW 2  -CH     Moving to and from a bed to a chair (including a wheelchair)? 1  -RW 1  -CH     Standing up from a chair using your arms (e.g., wheelchair, bedside chair)? 2  -RW 1  -CH     Climbing 3-5 steps with a railing? 1  -RW 1  -CH     To walk in hospital room? 1  -RW 1  -CH     AM-PAC 6 Clicks Score 9  -RW 8  -CH     Functional Assessment    Outcome Measure Options AM-PAC 6 Clicks Basic Mobility (PT)  -RW AM-PAC 6 Clicks Basic Mobility (PT)  -CH       User Key  (r) = Recorded By, (t) = Taken By, (c) = Cosigned By    Initials Name Provider Type     Sheridan Ko, PT Physical Therapist    RW Gianna Moore PTA Physical Therapy Assistant           Time Calculation:         PT Charges       01/05/18 1523          Time Calculation    Start Time 1500  -RW      Stop Time 1523  -RW      Time Calculation (min) 23 min  -RW      PT Received On 01/05/18  -      PT - Next Appointment 01/06/18  -        User Key  (r) = Recorded By, (t) = Taken By, (c) = Cosigned By    Initials Name Provider Type     Gianna Moore PTA Physical Therapy Assistant          Therapy Charges for Today     Code Description Service Date Service Provider Modifiers Qty    31162369618 HC PT THER PROC EA 15 MIN 1/5/2018 Gianna Moore PTA GP 2    26366528414 HC PT THER SUPP EA 15 MIN 1/5/2018 Gianna Moore PTA GP 2          PT G-Codes  Outcome Measure Options: AM-PAC 6 Clicks Basic Mobility (PT)    Gianna Moore PTA  1/5/2018

## 2018-01-05 NOTE — PROGRESS NOTES
" LOS: 13 days     Chief Complaint:  Follow-up GBS septicemia, T&L spine abscess    Interval History: NO acute events. Out of ICU to med-surg. He reports dull constant back pain worse w/ movement and relieved w/ oral pain meds. Tolerating PCN G without rash or diarrhea. He is passing his urine spontaneously with Petit removed.    ROS: no n/v/d; no rash    Vital Signs  Temp:  [97.7 °F (36.5 °C)-99.7 °F (37.6 °C)] 97.7 °F (36.5 °C)  Heart Rate:  [] 83  Resp:  [16-20] 20  BP: ()/(63-81) 97/63    Physical Exam:  BP 97/63 (BP Location: Right arm, Patient Position: Lying)  Pulse 83  Temp 97.7 °F (36.5 °C) (Oral)   Resp 20  Ht 195.6 cm (77.01\")  Wt 119 kg (261 lb 11.2 oz)  SpO2 94%  BMI 31.03 kg/m2  Body mass index is 31.03 kg/(m^2).    General: awake  Head: Normocephalic, no trauma  Eyes: no scleral icterus, no conjunctival pallor, no conjunctival hemorrhages.   ENT: MM dry, OP clear, no thrush. Fair dentition.   Neck: Supple  Cardiovascular: tachycardic, RR, no murmurs, rubs, or gallops; + RLE and RUE edema  Respiratory: lungs are clear;  no wheezing on ambient air  GI: Abdomen is soft, non-tender, non-distended  : Petit removed  Musculoskeletal: drain in back, could not view incision  Skin: RUE pain and swelling are worse, moderate TTP  Neurological: 4/5 strength in LEs, 5/5 in UEs  Psychiatric: Normal mood and affect   Vasc: no cyanosis; RIJ TLC removed    Antibiotics:  PCN G 4 million units q4h    LABS:  CBC, BMP, micro,  reviewed today  Lab Results   Component Value Date    WBC 9.33 01/04/2018    HGB 9.4 (L) 01/04/2018    HCT 29.8 (L) 01/04/2018    MCV 95.5 01/04/2018     01/04/2018     Lab Results   Component Value Date    GLUCOSE 147 (H) 01/04/2018    BUN 24 (H) 01/04/2018    CREATININE 1.18 01/04/2018    EGFRIFNONA 64 01/04/2018    BCR 20.3 01/04/2018    K 5.0 01/04/2018    CO2 24.0 01/04/2018    CALCIUM 9.0 01/04/2018    ALBUMIN 2.50 (L) 01/03/2018    LABIL2 0.6 01/03/2018    AST 37 " 01/03/2018    ALT 42 (H) 01/03/2018     Lab Results   Component Value Date    CRP 10.04 (H) 01/04/2018     Lab Results   Component Value Date    HGBA1C 11.44 (H) 12/23/2017     Hep C Ab positive  HCV RNA 6.2 million and genotype 2b    Microbiology:  [facility] BCX 12/22: Group B Strep in 2/2 sets (sensitive to PCN and ceftriaxone)  BCx: 12/23 negative  1/3 OR Back Cx: pending; Group B STrep    Radiology (personally reviewed):   No new imaging today    Assessment/Plan   1. Group B Strep septicemia secondary to IV drug use  -ARUNA negative; repeat blood cultures negative to date  -continue PCN G 4 million units q4h x 6 weeks with stop date 2/16/17  -weekly CBC w/ diff, BMP, CRP faxed to me at [phone]  -f/u with me in ID clinic 2/16/17 at 2:10 PM  -will order PICC since he is going to rehab unit; obviously we could not send him to his home with a PICC given IV drug use    2. Lumbar and thoracic spine epidural abscesses secondary to Group B Strep  - s/p T9 through S1 posterior lumbar decompression for epidural abscess on 1/3/18  - Continue antibiotic as per #1 above; will plan 6 weeks of antibiotics after I&D; stop date 2/16/17     3. History of IV drug abuse  -HIV negative; UDS with opiates     4. Right upper and lower extremity erythema and edema  -cephalic vein infection per CT; no abscess  -noted vascular surgery consultation  -ultrasound negative for abscess  -no current plans for surgical intervention    5. Uncontrolled DM2 in obese     6. Chronic Hep C   -HCV RNA 6.2 million and genotype 2b  -will address treatment as outpatient    Thank you for allowing me to be involved in the care of this patient. Infectious diseases will sign off at this time with antibiotics plan in place but please call me at [phone] if any further questions.

## 2018-01-05 NOTE — PROGRESS NOTES
"  [facility] PROGRESS NOTE    PATIENT IDENTIFICATION:   Name:  Sam Barrett      MRN:  [id number]     57 y.o.  male               CC:POD #2 s/p T9 through L1 lumbar laminectomies and decompression of epidural abscess/ hx IV drug use/ Hep C/ GBS septicemia      Subjective     Interval History: has complaints of back pain, \"hurts to move.\"    Objective     Vital signs in last 24 hours:  Temp:  [97.7 °F (36.5 °C)-99.7 °F (37.6 °C)] 97.7 °F (36.5 °C)  Heart Rate:  [] 83  Resp:  [16-20] 20  BP: ()/(63-74) 97/63  ICP ranges-    Intake/Output last 3 shifts:  I/O last 3 completed shifts:  In: 4324.3 [P.O.:360; I.V.:3464.3; IV Piggyback:500]  Out: 2890 [Urine:2650; Other:240]    Intake/Output this shift:     LION drain #1: 60  LION drain #2: 40      Physical Exam:  General:   Awake, alert, and oriented x 3. NAD  O2 via NC  Posterior thoracic and lumbar dressing CDI, flat, normal surrounding skin  LION drains with dark bloody OP  HERR well  multiple tattoos all chest/back/arms  Petit catheter DC'd  Strength equal BLE   Coordination: Finger to nose test shows no dysmetria.  Rapid alternating movements are normal.  Heel to shin normal.  Extremities:  Patient is wearing ADRIAN and SCD bilaterally    LABS:    Lab Results (last 24 hours)     Procedure Component Value Units Date/Time    Needle Stick Pt Source [[id number]] Collected:  01/04/18 1151    Specimen:  Blood Updated:  01/04/18 1248    Narrative:       The following orders were created for panel order Needle Stick Pt Source.  Procedure                               Abnormality         Status                     ---------                               -----------         ------                     HCV RNA, PCR, Qualitative[[id number]]                        In process                 Hepatitis B Surface Antigen[[id number]]  Normal              Final result               HIV-1 / O / 2 Ag / Antib...[[id number]]  Normal              Final result             "   Hepatitis C Antibody[[id number]]         Abnormal            Final result                 Please view results for these tests on the individual orders.    HCV RNA, PCR, Qualitative [[id number]] Collected:  01/04/18 1151    Specimen:  Blood Updated:  01/04/18 1159    Hepatitis B Surface Antigen [[id number]]  (Normal) Collected:  01/04/18 1151    Specimen:  Blood Updated:  01/04/18 1239     Hepatitis B Surface Ag Non-Reactive    HIV-1 / O / 2 Ag / Antibody 4th Generation [[id number]]  (Normal) Collected:  01/04/18 1151    Specimen:  Blood Updated:  01/04/18 1243     HIV-1/ HIV-2 Non-Reactive     HIV-1 P24 Ag Screen Non-Reactive    Hepatitis C Antibody [[id number]]  (Abnormal) Collected:  01/04/18 1151    Specimen:  Blood Updated:  01/04/18 1248     Hepatitis C Ab Reactive (C)    CBC (No Diff) [[id number]]  (Abnormal) Collected:  01/04/18 1246    Specimen:  Blood Updated:  01/04/18 1314     WBC 9.33 10*3/mm3      RBC 3.12 (L) 10*6/mm3      Hemoglobin 9.4 (L) g/dL      Hematocrit 29.8 (L) %      MCV 95.5 fL      MCH 30.1 pg      MCHC 31.5 (L) g/dL      RDW 16.4 (H) %      RDW-SD 51.9 fl      MPV 11.5 fL      Platelets 316 10*3/mm3     POC Glucose Once [[id number]]  (Abnormal) Collected:  01/04/18 1520    Specimen:  Blood Updated:  01/04/18 1521     Glucose 169 (H) mg/dL     Narrative:       Meter: [id number] : [id number] Jose Juan Jose    POC Glucose Once [[id number]]  (Abnormal) Collected:  01/04/18 2030    Specimen:  Blood Updated:  01/04/18 2033     Glucose 172 (H) mg/dL     Narrative:       Meter: [id number] : [id number] Alex Gilliland RN          IMAGING STUDIES:    No new imaging       Meds reviewed/changed: Yes    Current Facility-Administered Medications   Medication Dose Route Frequency Provider Last Rate Last Dose   • acetaminophen (TYLENOL) tablet 650 mg  650 mg Oral Q4H PRN Eusebio Farnsworth MD   650 mg at 12/31/17 0516    Or   • acetaminophen (TYLENOL) suppository 650 mg  650 mg Rectal Q4H PRN Eusebio  Mt Farnsworth MD       • atorvastatin (LIPITOR) tablet 40 mg  40 mg Oral Daily Urban Mason MD   40 mg at 01/05/18 0905   • bisacodyl (DULCOLAX) EC tablet 10 mg  10 mg Oral Daily PRN Nghia Esparza MD       • dextrose (D50W) solution 25 g  25 g Intravenous Q15 Min PRN Eusebio Farnsworth MD       • dextrose (GLUTOSE) oral gel 15 g  15 g Oral Q15 Min PRN Eusebio Farnsworth MD       • docusate sodium (COLACE) capsule 100 mg  100 mg Oral BID PRN Nghia Esparza MD       • enoxaparin (LOVENOX) syringe 40 mg  40 mg Subcutaneous Q24H Urban Mason MD   40 mg at 01/04/18 2022   • fentaNYL citrate (PF) (SUBLIMAZE) injection 50 mcg  50 mcg Intravenous Q1H PRN Chaparrita Palumbo MD   50 mcg at 01/04/18 1509   • glipiZIDE (GLUCOTROL) tablet 10 mg  10 mg Oral QAM AC Juan Nicholson MD   10 mg at 01/05/18 0904   • glucagon (human recombinant) (GLUCAGEN DIAGNOSTIC) injection 1 mg  1 mg Subcutaneous Q15 Min PRN Eusebio Farnsworth MD       • HYDROcodone-acetaminophen (NORCO)  MG per tablet 1 tablet  1 tablet Oral Q4H PRN Nghia Esparza MD   1 tablet at 01/04/18 2352   • Influenza Vac Subunit Quad (FLUCELVAX) injection 0.5 mL  0.5 mL Intramuscular During Hospitalization Eusebio Farnsworth MD       • insulin aspart (novoLOG) injection 2-5 Units  2-5 Units Subcutaneous 4x Daily AC & at Bedtime Ever Gongora MD   2.5 Units at 01/05/18 0913   • ipratropium-albuterol (DUO-NEB) nebulizer solution 3 mL  3 mL Nebulization 4x Daily - RT Eusebio Farnsworth MD   3 mL at 01/05/18 0845   • lactated ringers infusion  75 mL/hr Intravenous Continuous Nghia Esparza MD 75 mL/hr at 01/05/18 0248 75 mL/hr at 01/05/18 0248   • Magnesium Sulfate 2 gram infusion - Mg less than or equal to 1.5 mg/dL  2 g Intravenous PRN Eusebio Farnsworth MD        Or   • Magesium Sulfate 1 gram infusion - Mg 1.6-1.9 mg/dL  1 g Intravenous PRN Eusebio Farnsworth MD       • metFORMIN (GLUCOPHAGE) tablet 500 mg  500 mg Oral BID With  Meals Juan Nicholson MD   500 mg at 01/05/18 0904   • metoprolol tartrate (LOPRESSOR) tablet 50 mg  50 mg Oral Q8H CHATA Hebert   50 mg at 01/05/18 0314   • naloxone (NARCAN) injection 0.1 mg  0.1 mg Intravenous Q5 Min PRN Nghia Esparza MD       • ondansetron (ZOFRAN) injection 4 mg  4 mg Intravenous Q6H PRN Eusebio Farnsworth MD       • ondansetron (ZOFRAN) tablet 4 mg  4 mg Oral Q6H PRN Nghia Esparza MD        Or   • ondansetron ODT (ZOFRAN-ODT) disintegrating tablet 4 mg  4 mg Oral Q6H PRN Nghia Esparza MD        Or   • ondansetron (ZOFRAN) injection 4 mg  4 mg Intravenous Q6H PRN Nghia Esparza MD       • oxyCODONE-acetaminophen (PERCOCET)  MG per tablet 1 tablet  1 tablet Oral Q4H PRN Chacho Kam MD   1 tablet at 01/05/18 0421   • penicillin G potassium 4 Million Units in sodium chloride 0.9 % 100 mL IVPB  4 Million Units Intravenous Q4H Bowen Ford MD   4 Million Units at 01/05/18 0659   • potassium chloride (MICRO-K) CR capsule 40 mEq  40 mEq Oral PRN Eusebio Farnsworth MD   40 mEq at 12/27/17 2316    Or   • potassium chloride (KLOR-CON) packet 40 mEq  40 mEq Oral PRN Eusebio Farnsworth MD        Or   • potassium chloride 10 mEq in 100 mL IVPB  10 mEq Intravenous Q1H PRN Eusebio Farnsworth  mL/hr at 12/27/17 0241 10 mEq at 12/27/17 0241   • sodium chloride 0.9 % flush 1-10 mL  1-10 mL Intravenous PRN Eusebio Farnsworth MD           Assessment/Plan     ASSESSMENT:    Principal Problem:    Sepsis  Active Problems:    Abnormal MRI, lumbar spine    Diabetes    Abscess in epidural space of lumbar spine    Abscess in epidural space of thoracic spine    Postoperative anemia due to acute blood loss    Type 2 diabetes mellitus without complication      PLAN: He remains stable from a neurosurgical standpoint.  Bilateral LION drains with decreased output in the last 24 hours.  Posterior midline thoracic/lumbar dressing is clean and dry. IV antibiotics per ID.  Continue the same    I discussed the patients findings and my recommendations with patient, nursing staff and Dr Esparza       LOS: 13 days       Areyl Gilbert, APRN  1/5/2018  11:17 AM

## 2018-01-06 LAB
BACTERIA SPEC AEROBE CULT: ABNORMAL
GLUCOSE BLDC GLUCOMTR-MCNC: 104 MG/DL (ref 70–130)
GLUCOSE BLDC GLUCOMTR-MCNC: 109 MG/DL (ref 70–130)
GLUCOSE BLDC GLUCOMTR-MCNC: 113 MG/DL (ref 70–130)
GLUCOSE BLDC GLUCOMTR-MCNC: 125 MG/DL (ref 70–130)
GRAM STN SPEC: ABNORMAL
HEPATITIS C RNA-NAA: POSITIVE
STREP GROUPING: ABNORMAL

## 2018-01-06 PROCEDURE — 94799 UNLISTED PULMONARY SVC/PX: CPT

## 2018-01-06 PROCEDURE — 25010000002 ENOXAPARIN PER 10 MG: Performed by: INTERNAL MEDICINE

## 2018-01-06 PROCEDURE — 25010000003 PENICILLIN G POTASSIUM PER 600000 UNITS: Performed by: INTERNAL MEDICINE

## 2018-01-06 PROCEDURE — 82962 GLUCOSE BLOOD TEST: CPT

## 2018-01-06 PROCEDURE — 97116 GAIT TRAINING THERAPY: CPT | Performed by: PHYSICAL THERAPIST

## 2018-01-06 RX ADMIN — METFORMIN HYDROCHLORIDE 500 MG: 500 TABLET ORAL at 17:31

## 2018-01-06 RX ADMIN — OXYCODONE HYDROCHLORIDE AND ACETAMINOPHEN 1 TABLET: 10; 325 TABLET ORAL at 04:51

## 2018-01-06 RX ADMIN — SODIUM CHLORIDE 4 MILLION UNITS: 0.9 INJECTION INTRAVENOUS at 06:32

## 2018-01-06 RX ADMIN — SODIUM CHLORIDE 4 MILLION UNITS: 0.9 INJECTION INTRAVENOUS at 16:57

## 2018-01-06 RX ADMIN — OXYCODONE HYDROCHLORIDE AND ACETAMINOPHEN 1 TABLET: 10; 325 TABLET ORAL at 23:33

## 2018-01-06 RX ADMIN — METOPROLOL TARTRATE 50 MG: 50 TABLET, FILM COATED ORAL at 16:56

## 2018-01-06 RX ADMIN — ATORVASTATIN CALCIUM 40 MG: 20 TABLET, FILM COATED ORAL at 09:04

## 2018-01-06 RX ADMIN — GLIPIZIDE 10 MG: 10 TABLET ORAL at 06:51

## 2018-01-06 RX ADMIN — SODIUM CHLORIDE 4 MILLION UNITS: 0.9 INJECTION INTRAVENOUS at 23:33

## 2018-01-06 RX ADMIN — SODIUM CHLORIDE 4 MILLION UNITS: 0.9 INJECTION INTRAVENOUS at 03:36

## 2018-01-06 RX ADMIN — METOPROLOL TARTRATE 50 MG: 50 TABLET, FILM COATED ORAL at 09:04

## 2018-01-06 RX ADMIN — SODIUM CHLORIDE 4 MILLION UNITS: 0.9 INJECTION INTRAVENOUS at 11:36

## 2018-01-06 RX ADMIN — IPRATROPIUM BROMIDE AND ALBUTEROL SULFATE 3 ML: .5; 3 SOLUTION RESPIRATORY (INHALATION) at 15:48

## 2018-01-06 RX ADMIN — ENOXAPARIN SODIUM 40 MG: 40 INJECTION SUBCUTANEOUS at 23:34

## 2018-01-06 RX ADMIN — IPRATROPIUM BROMIDE AND ALBUTEROL SULFATE 3 ML: .5; 3 SOLUTION RESPIRATORY (INHALATION) at 23:06

## 2018-01-06 RX ADMIN — OXYCODONE HYDROCHLORIDE AND ACETAMINOPHEN 1 TABLET: 10; 325 TABLET ORAL at 12:39

## 2018-01-06 RX ADMIN — METFORMIN HYDROCHLORIDE 500 MG: 500 TABLET ORAL at 09:04

## 2018-01-06 RX ADMIN — OXYCODONE HYDROCHLORIDE AND ACETAMINOPHEN 1 TABLET: 10; 325 TABLET ORAL at 16:57

## 2018-01-06 RX ADMIN — OXYCODONE HYDROCHLORIDE AND ACETAMINOPHEN 1 TABLET: 10; 325 TABLET ORAL at 00:09

## 2018-01-06 RX ADMIN — IPRATROPIUM BROMIDE AND ALBUTEROL SULFATE 3 ML: .5; 3 SOLUTION RESPIRATORY (INHALATION) at 11:49

## 2018-01-06 NOTE — PLAN OF CARE
Problem: Patient Care Overview (Adult)  Goal: Plan of Care Review  Outcome: Ongoing (interventions implemented as appropriate)   01/06/18 1700   Coping/Psychosocial Response Interventions   Plan Of Care Reviewed With patient   Patient Care Overview   Progress no change   Outcome Evaluation   Outcome Summary/Follow up Plan Slow moving with PT and resistant to moving but did stand for about 30 seconds today with walker and assist of 2. Needs strong encouragement      01/06/18 1700   Coping/Psychosocial Response Interventions   Plan Of Care Reviewed With patient   Patient Care Overview   Progress no change   Outcome Evaluation   Outcome Summary/Follow up Plan Slow moving with PT and resistant to moving but did stand for about 30 seconds today with walker and assist of 2. Needs strong encouragement

## 2018-01-06 NOTE — PROGRESS NOTES
Ethan Frank MD                          [phone]      Patient ID:    Name:  Sam Barrett    MRN:  [id number]    1960   57 y.o.  male            Patient Care Team:  Casey Verduzco Jr., MD as PCP - General (Family Medicine)    LOS: 14  HPI:  Follow up sepsis, group B strep septicemia, epidural abscess, infection of cephalic vein, IVDU, New onset DM, HCV  Status post T9 through S1 posterior lumbar decompression of epidural abscess.  Extubated postop   Patient transferred to the floor.  Feels his pain is better controlled.    INTERVAL:  No acute change,  Al little anxious regarding up coming surgery.  strenght imprvoing    Objective     Vital Signs past 24hrs    BP range: BP: (113-117)/(69-86) 116/86  Pulse range: Heart Rate:  [77-87] 84  Resp rate range: Resp:  [18-22] 18  Temp range: Temp (24hrs), Av.8 °F (36.6 °C), Min:97.6 °F (36.4 °C), Max:97.9 °F (36.6 °C)    O2 Device: nasal cannula       119 kg (261 lb 11.2 oz); Body mass index is 31.03 kg/(m^2).    Intake/Output Summary (Last 24 hours) at 18 1841  Last data filed at 18 0815   Gross per 24 hour   Intake                0 ml   Output             1010 ml   Net            -1010 ml     Exam:  GENERAL:  NAD, Aox3No distress currently.   HEENT:  EOMI, nonicteric sclera, no JVD  PULMONARY:    No resp distress CTAB, no wheeze, no crackles, no rhonchi, symmetric air entry  CARDIAC:  RRR no MRG, S1 S2  ABD: SNTND BS+  EXT: RUE with some erythematous vein, minimal edema strength 5/5 symmetric today, pulses symmetric 2+   NEURO:  CNs II-XII intact, no focal deficits  SKIN: as above  PSYCH: appropriate mood    Scheduled meds:      atorvastatin 40 mg Oral Daily   enoxaparin 40 mg Subcutaneous Q24H   glipiZIDE 10 mg Oral QAM AC   insulin aspart 2-5 Units Subcutaneous 4x Daily AC & at Bedtime   ipratropium-albuterol 3 mL Nebulization 4x Daily - RT   metFORMIN 500 mg Oral BID With Meals   metoprolol tartrate 50 mg Oral Q8H    penicillin g (potassium) 4 Million Units Intravenous Q4H                     lactated ringers 75 mL/hr Last Rate: 75 mL/hr (01/05/18 0248)       Data Review:      Results from last 7 days  Lab Units 01/04/18  1246 01/04/18  0527 01/03/18 2013 01/03/18  0608   SODIUM mmol/L  --  133*  --  134*   POTASSIUM mmol/L  --  5.0  --  4.0   CHLORIDE mmol/L  --  97*  --  98   CO2 mmol/L  --  24.0  --  26.6   BUN mg/dL  --  24*  --  14   CREATININE mg/dL  --  1.18  --  0.58*   CALCIUM mg/dL  --  9.0  --  8.8   BILIRUBIN mg/dL  --   --   --  1.0   ALK PHOS U/L  --   --   --  91   ALT (SGPT) U/L  --   --   --  42*   AST (SGOT) U/L  --   --   --  37   GLUCOSE mg/dL  --  147*  --  151*   WBC 10*3/mm3 9.33 10.31  --  9.35   HEMOGLOBIN g/dL 9.4* 9.3* 9.4* 11.5*   PLATELETS 10*3/mm3 316 367  --  343       Lab Results   Component Value Date    CALCIUM 9.0 01/04/2018       Results from last 7 days  Lab Units 01/03/18  1656 01/03/18  1642 01/03/18  1639   WOUNDCX   --   --    Rare Streptococcus agalactiae (Group B)*   GRAM STAIN RESULT  Rare (1+) WBCs seen  No organisms seen Moderate (3+) Red blood cells  Rare (1+) WBCs seen  No organisms seen Moderate (3+) Red blood cells  Rare (1+) WBCs seen  Occasional Gram positive cocci         Results Review:    I have reviewed the available laboratory results and reviewed the chest imaging personally    ASSESSMENT:   Acute hypoxic respiratory failure resolved   Strep septicemia/bacteremia   Right Cephalic vein Pylephlebitis  History of IV drug abuse   Cellulitis lower extremity   New onset diabetes mellitus  Chronic narcotic dependence/ abuse  Intermittent SVT   Hepatitis C  Epidural abscess    PLAN:  Postop pain control.  Remains hemodynamically stable at this time   Continue supportive care   Antibiotics managed by infectious diseases. - appreciate assistance  Insulin per endocrinology  Access/psychiatry  center eval noted    will likely need rehabilitation at discharge.    DVT ppx    Full Code     Ethan Frank MD  1/6/2018

## 2018-01-06 NOTE — ANESTHESIA POSTPROCEDURE EVALUATION
"Patient: Sam Barrett    Procedure Summary     Date Anesthesia Start Anesthesia Stop Room / Location    01/03/18 1518 1956  [facility] /  [facility]       Procedure Diagnosis Surgeon Provider    T9 through S1 posterior lumbar decompression for epidural abscess. (N/A Spine Thoracic) Abnormal MRI, lumbar spine  (Abnormal MRI, lumbar spine [R93.7]) MD Jonny Wisdom MD          Anesthesia Type: general  Last vitals  BP   116/86 (01/06/18 0800)   Temp   36.4 °C (97.6 °F) (01/06/18 0800)   Pulse   84 (01/06/18 1548)   Resp   18 (01/06/18 1548)     SpO2   98 % (01/06/18 1548)     Post Anesthesia Care and Evaluation    Patient location during evaluation: bedside  Patient participation: complete - patient participated  Level of consciousness: awake  Pain management: adequate  Airway patency: patent  Anesthetic complications: No anesthetic complications    Cardiovascular status: acceptable  Respiratory status: acceptable  Hydration status: acceptable    Comments: /86 (BP Location: Right arm, Patient Position: Lying)  Pulse 84  Temp 36.4 °C (97.6 °F) (Oral)   Resp 18  Ht 195.6 cm (77.01\")  Wt 119 kg (261 lb 11.2 oz)  SpO2 98%  BMI 31.03 kg/m2        "

## 2018-01-06 NOTE — PLAN OF CARE
Problem: Patient Care Overview (Adult)  Goal: Plan of Care Review  Outcome: Ongoing (interventions implemented as appropriate)   01/06/18 0240 01/06/18 1609   Coping/Psychosocial Response Interventions   Plan Of Care Reviewed With --  patient   Patient Care Overview   Progress improving --    Outcome Evaluation   Outcome Summary/Follow up Plan --  Pt A&O.VSS. Dressing dry & intact. Small amt of drainage noted from LION drains. Prn percocet given for intermittent back pain.. Pt c/o of pain and difficulty with urination. 600 out per urinal. I&D of right arm abcess. Pt currently resting with eyes closed. Will continue to monitor.     Goal: Discharge Needs Assessment  Outcome: Ongoing (interventions implemented as appropriate)      Problem: Fall Risk (Adult)  Goal: Absence of Falls  Outcome: Ongoing (interventions implemented as appropriate)      Problem: Pressure Ulcer Risk (Antonio Scale) (Adult,Obstetrics,Pediatric)  Goal: Skin Integrity  Outcome: Ongoing (interventions implemented as appropriate)      Problem: Respiratory Insufficiency (Adult)  Goal: Effective Ventilation  Outcome: Ongoing (interventions implemented as appropriate)    Goal: Effective Ventilation  Outcome: Ongoing (interventions implemented as appropriate)      Problem: Perioperative Period (Adult)  Goal: Signs and Symptoms of Listed Potential Problems Will be Absent or Manageable (Perioperative Period)  Outcome: Ongoing (interventions implemented as appropriate)      Problem: Pain, Acute (Adult)  Goal: Identify Related Risk Factors and Signs and Symptoms  Outcome: Ongoing (interventions implemented as appropriate)    Goal: Acceptable Pain Control/Comfort Level  Outcome: Ongoing (interventions implemented as appropriate)      Problem: Skin Integrity Impairment, Risk/Actual (Adult)  Goal: Identify Related Risk Factors and Signs and Symptoms  Outcome: Ongoing (interventions implemented as appropriate)    Goal: Skin Integrity/Wound Healing  Outcome:  Ongoing (interventions implemented as appropriate)

## 2018-01-06 NOTE — PROGRESS NOTES
LOS: 14 days   Patient Care Team:  Casey Verduzco Jr., MD as PCP - General (Family Medicine)    Chief Complaint: Back pain    Subjective     History of Present Illness    Subjective    The patient has had 55 and 50 cc respectively out of his drains.  This is similar to what he had out yesterday.    This patient still complains of a lot of back pain about as expected from the substantial surgery that he had.    History taken from: patient chart    Objective     Vital Signs  Temp:  [97.6 °F (36.4 °C)-97.9 °F (36.6 °C)] 97.6 °F (36.4 °C)  Heart Rate:  [77-87] 86  Resp:  [18-22] 18  BP: (113-117)/(69-86) 116/86    Objective    Results Review:     I reviewed the patient's new clinical results.  He is afebrile.    Medication Review: na    Assessment/Plan     Principal Problem:    Sepsis  Active Problems:    Abnormal MRI, lumbar spine    Diabetes    Abscess in epidural space of lumbar spine    Abscess in epidural space of thoracic spine    Postoperative anemia due to acute blood loss    Type 2 diabetes mellitus without complication      Assessment & Plan     I told the patient that since the primary team thought the drainage was too much yesterday to take a drain out and sensitive same today we will leave the drains plus with a history of infection he probably needs to have them in for a while anyway.  We'll check a CBC in the morning and see how he is doing.    Damon Bonilla MD  01/06/18  11:04 AM    Time: na

## 2018-01-06 NOTE — PLAN OF CARE
Problem: Patient Care Overview (Adult)  Goal: Plan of Care Review  Outcome: Ongoing (interventions implemented as appropriate)   01/06/18 0240   Coping/Psychosocial Response Interventions   Plan Of Care Reviewed With patient   Patient Care Overview   Progress improving   Outcome Evaluation   Outcome Summary/Follow up Plan VSS, dressing intact and dry LION drains intact and patent minimal drainage noted, prn PO pain med given as ordered, Pt c/o bladder distension and inability to void, bladder scan 500ml, straight cath performed at d as ordered 500 cc clear yellow urine evacuated , pt tolerated well, will continue to monitor       Problem: Fall Risk (Adult)  Goal: Absence of Falls  Outcome: Ongoing (interventions implemented as appropriate)      Problem: Pressure Ulcer Risk (Antonio Scale) (Adult,Obstetrics,Pediatric)  Goal: Skin Integrity  Outcome: Ongoing (interventions implemented as appropriate)

## 2018-01-06 NOTE — PROGRESS NOTES
Chacho Kam MD                          [phone]      Patient ID:    Name:  Sam Barrett    MRN:  [id number]    1960   57 y.o.  male            Patient Care Team:  Casey Verduzco Jr., MD as PCP - General (Family Medicine)    LOS: 13  INTERVAL:  Follow up sepsis, group B strep septicemia, epidural abscess, infection of cephalic vein, IVDU, New onset DM, HCV  Status post T9 through S1 posterior lumbar decompression of epidural abscess.  Extubated postop   Patient transferred to the floor.  Feels his pain is better controlled.      Objective     Vital Signs past 24hrs    BP range: BP: ()/(63-73) 117/69  Pulse range: Heart Rate:  [] 85  Resp rate range: Resp:  [16-22] 22  Temp range: Temp (24hrs), Av.8 °F (36.6 °C), Min:97.7 °F (36.5 °C), Max:97.8 °F (36.6 °C)    O2 Device: nasal cannula       119 kg (261 lb 11.2 oz); Body mass index is 31.03 kg/(m^2).    Intake/Output Summary (Last 24 hours) at 18  Last data filed at 18 1637   Gross per 24 hour   Intake             1070 ml   Output             1435 ml   Net             -365 ml     Exam:  GENERAL:  NAD, Aox3No distress currently.   HEENT:  EOMI, nonicteric sclera, no JVD  PULMONARY:    No resp distress CTAB, no wheeze, no crackles, no rhonchi, symmetric air entry  CARDIAC:  RRR no MRG, S1 S2  ABD: SNTND BS+  EXT: mild edematous right le warm 1+ pitting edema, left lower extremity with distal pulses intact  and RUE with good rom, minimal edema strength 5/5 symmetric today, pulses symmetric 2+   NEURO:  CNs II-XII intact, no focal deficits  SKIN: as above  PSYCH: appropriate mood    Scheduled meds:      atorvastatin 40 mg Oral Daily   enoxaparin 40 mg Subcutaneous Q24H   glipiZIDE 10 mg Oral QAM AC   insulin aspart 2-5 Units Subcutaneous 4x Daily AC & at Bedtime   ipratropium-albuterol 3 mL Nebulization 4x Daily - RT   metFORMIN 500 mg Oral BID With Meals   metoprolol tartrate 50 mg Oral Q8H   penicillin g  (potassium) 4 Million Units Intravenous Q4H                     lactated ringers 75 mL/hr Last Rate: 75 mL/hr (01/05/18 0248)       Data Review:      Results from last 7 days  Lab Units 01/04/18  1246 01/04/18  0527 01/03/18 2013 01/03/18  0608  12/30/17  0536   SODIUM mmol/L  --  133*  --  134*  --  139   POTASSIUM mmol/L  --  5.0  --  4.0  --  4.0   CHLORIDE mmol/L  --  97*  --  98  --  103   CO2 mmol/L  --  24.0  --  26.6  --  26.1   BUN mg/dL  --  24*  --  14  --  15   CREATININE mg/dL  --  1.18  --  0.58*  --  0.45*   CALCIUM mg/dL  --  9.0  --  8.8  --  8.3*   BILIRUBIN mg/dL  --   --   --  1.0  --   --    ALK PHOS U/L  --   --   --  91  --   --    ALT (SGPT) U/L  --   --   --  42*  --   --    AST (SGOT) U/L  --   --   --  37  --   --    GLUCOSE mg/dL  --  147*  --  151*  --  132*   WBC 10*3/mm3 9.33 10.31  --  9.35  < > 10.34   HEMOGLOBIN g/dL 9.4* 9.3* 9.4* 11.5*  < > 12.0*   PLATELETS 10*3/mm3 316 367  --  343  < > 264   INR   --   --   --   --   --  1.25*   PROCALCITONIN ng/mL  --   --   --   --   --  0.29*   < > = values in this interval not displayed.    Lab Results   Component Value Date    CALCIUM 9.0 01/04/2018       Results from last 7 days  Lab Units 01/03/18  1656 01/03/18  1642 01/03/18  1639   WOUNDCX   --   --    Rare Gram Positive Cocci*   GRAM STAIN RESULT  Rare (1+) WBCs seen  No organisms seen Moderate (3+) Red blood cells  Rare (1+) WBCs seen  No organisms seen Moderate (3+) Red blood cells  Rare (1+) WBCs seen  Occasional Gram positive cocci         Results Review:    I have reviewed the available laboratory results and reviewed the chest imaging personally    ASSESSMENT:   Acute hypoxic respiratory failure resolved   Strep septicemia/bacteremia   Right Cephalic vein Pylephlebitis  History of IV drug abuse   Cellulitis lower extremity   New onset diabetes mellitus  Chronic narcotic dependence/ abuse  Intermittent SVT   Hepatitis C  Epidural abscess    PLAN:  Postop pain control.   Remains hemodynamically stable at this time   Continue supportive care   Antibiotics managed by infectious diseases. - appreciate assistance  Insulin per endocrinology  Access/psychiatry  center eval noted    will likely need rehabilitation at discharge.    DVT ppx   Full Code     Chacho Kam MD  1/5/2018

## 2018-01-06 NOTE — THERAPY TREATMENT NOTE
Acute Care - Physical Therapy Treatment Note  [facility]     Patient Name: Sam Barrett  : 1960  MRN: [id number]  Today's Date: 2018  Onset of Illness/Injury or Date of Surgery Date: 17          Admit Date: 2017    Visit Dx:    ICD-10-CM ICD-9-CM   1. Abnormal MRI, lumbar spine R93.7 793.7     Patient Active Problem List   Diagnosis   • Sepsis   • Abnormal MRI, lumbar spine   • Diabetes   • Abscess in epidural space of lumbar spine   • Abscess in epidural space of thoracic spine   • Postoperative anemia due to acute blood loss   • Type 2 diabetes mellitus without complication               Adult Rehabilitation Note       18 1600 18 1500       Rehab Assessment/Intervention    Discipline physical therapist  -ZK physical therapy assistant  -RW     Document Type therapy note (daily note)  -ZK therapy note (daily note)  -RW     Subjective Information complains of;weakness;pain  -ZK agree to therapy;complains of;pain  -RW     Patient Effort, Rehab Treatment poor  -ZK fair  -RW     Symptoms Noted During/After Treatment fatigue;increased pain  -ZK      Symptoms Noted Comment focusing on pain excessively  -ZK      Precautions/Limitations fall precautions;spinal precautions  -ZK fall precautions;spinal precautions  -RW     Specific Treatment Considerations significant edema right hand noted  -ZK      Equipment Issued to Patient tub bench  -ZK      Recorded by [ZK] Zorre Zeno Kimura, PT [RW] Gianna Moore PTA     Vital Signs    Pre SpO2 (%) 96  -ZK      O2 Delivery Pre Treatment supplemental O2  -ZK      Recorded by [ZK] Zorre Zeno Kimura, PT      Pain Assessment    Pain Assessment 0-10  -ZK 0-10  -RW     Pain Score 8  -ZK 8  -RW     Pain Type Acute pain;Surgical pain  -ZK Acute pain;Surgical pain  -RW     Pain Location Back  -ZK Back  -RW     Pain Intervention(s) Medication (See MAR)  -ZK Medication (See MAR);Repositioned;Ambulation/increased activity  -RW     Response to Interventions   unchanged  -RW     Recorded by [ZK] Zorre Zeno Kimura, PT [RW] Gianna Moore PTA     Cognitive Assessment/Intervention    Current Cognitive/Communication Assessment functional  -ZK functional  -RW     Orientation Status oriented x 4  -ZK oriented x 4  -RW     Follows Commands/Answers Questions  100% of the time;needs cueing  -RW     Personal Safety  WNL/WFL  -RW     Personal Safety Interventions  fall prevention program maintained;nonskid shoes/slippers when out of bed  -RW     Recorded by [ZK] Zorre Zeno Kimura, PT [RW] Gianna Moore PTA     Bed Mobility, Assessment/Treatment    Bed Mobility, Assistive Device  bed rails  -RW     Bed Mobility, Roll Right, [place] moderate assist (50% patient effort);2 person assist required  -ZK moderate assist (50% patient effort);verbal cues required;nonverbal cues required (demo/gesture)  -RW     Bed Mob, Sidelying to Sit, [place] moderate assist (50% patient effort);2 person assist required  -ZK moderate assist (50% patient effort);2 person assist required;verbal cues required;nonverbal cues required (demo/gesture)  -RW     Bed Mob, Sit to Sidelying, [place]  moderate assist (50% patient effort);2 person assist required;verbal cues required;nonverbal cues required (demo/gesture)  -RW     Bed Mobility, Impairments  strength decreased;pain  -RW     Bed Mobility, Comment  Verbal cueing required for log roll  -RW     Recorded by [ZK] Zorre Zeno Kimura, PT [RW] Gianna Moore PTA     Transfer Assessment/Treatment    Transfers, Sit-Stand [place] moderate assist (50% patient effort);2 person assist required  -ZK moderate assist (50% patient effort);maximum assist (25% patient effort);2 person assist required;verbal cues required  -RW     Transfers, Stand-Sit [place]  moderate assist (50% patient effort);maximum assist (25% patient effort);2 person assist required;verbal cues required  -RW     Transfers, Sit-Stand-Sit, Assist Device rolling  walker;elevated surface  -ZK rolling walker;elevated surface   Stood x2 at EOB  -RW     Transfer, Impairments  strength decreased;pain  -RW     Transfer, Comment stood for 30 seconds while RN straightened out draw sheet  -ZK      Recorded by [ELVER] Zorre Zeno Kimura, PT [RW] Gianna Moore PTA     Gait Assessment/Treatment    Gait, [place] Level  unable to perform  -RW     Recorded by  [RW] Gianna Moore PTA     Balance Skills Training    Sitting-Level of Assistance  Contact guard  -RW     Sitting-Balance Support  Feet supported;Right upper extremity supported;Left upper extremity supported  -RW     Recorded by  [RW] Gianna Moore PTA     Positioning and Restraints    Pre-Treatment Position in bed  -ZK in bed  -RW     Post Treatment Position bed  -ZK bed  -RW     In Bed supine;call light within reach;exit alarm on;with nsg;SCD pump applied  -ZK supine;call light within reach;encouraged to call for assist   nsg present during tx  -RW     Recorded by [ELVER] Zorre Zeno Kimura, PT [RW] Gianna Moore PTA       User Key  (r) = Recorded By, (t) = Taken By, (c) = Cosigned By    Initials Name Effective Dates    ZK Zorre Zeno Kimura, PT 03/23/16 -     RW Gianna Moore PTA 04/06/16 -                 IP PT Goals       01/04/18 1700 12/26/17 1303       Bed Mobility PT LTG    Bed Mobility PT LTG, Time to Achieve 1 wk  -CH 1 wk  -CH     Bed Mobility PT LTG, Activity Type all bed mobility  -CH all bed mobility  -CH     Bed Mobility PT LTG, [place] Level minimum assist (75% patient effort)  -CH contact guard assist  -CH     Transfer Training PT LTG    Transfer Training PT LTG, Time to Achieve 1 wk  -CH 1 wk  -CH     Transfer Training PT LTG, Activity Type all transfers  -CH all transfers  -CH     Transfer Training PT LTG, [place] Level minimum assist (75% patient effort)  -CH contact guard assist  -CH     Transfer Training PT LTG, Assist Device walker, rolling  -CH      Gait Training PT LTG    Gait Training  Goal PT LTG, Time to Achieve 1 wk  -CH 1 wk  -CH     Gait Training Goal PT LTG, [place] Level minimum assist (75% patient effort)  -CH contact guard assist  -CH     Gait Training Goal PT LTG, Assist Device walker, rolling  -CH      Gait Training Goal PT LTG, Distance to Achieve 30  -  -CH       User Key  (r) = Recorded By, (t) = Taken By, (c) = Cosigned By    Initials Name Provider Type    DAVY Ko, PT Physical Therapist          Physical Therapy Education     Title: PT OT SLP Therapies (Done)     Topic: Physical Therapy (Done)     Point: Mobility training (Done)    Learning Progress Summary    Learner Readiness Method Response Comment Documented by Status   Patient Acceptance E,TB,D VU,NR  RW 01/05/18 1525 Done    Acceptance E,TB,D VU,NR   01/04/18 1700 Done    Acceptance E,D VU,NR   01/02/18 1549 Done    Acceptance D VU,NR   12/31/17 1017 Done    Acceptance E VU  MA 12/29/17 1034 Done    Acceptance E,D NR,VU   12/28/17 1330 Done    Acceptance E,TB,D VU,NR   12/27/17 1617 Done    Acceptance E,TB,D VU,NR   12/26/17 1303 Done               Point: Home exercise program (Done)    Learning Progress Summary    Learner Readiness Method Response Comment Documented by Status   Patient Acceptance E,TB,D VU,NR   01/04/18 1700 Done    Acceptance D VU,NR   12/31/17 1017 Done    Acceptance E,TB,D VU,NR   12/26/17 1303 Done               Point: Body mechanics (Done)    Learning Progress Summary    Learner Readiness Method Response Comment Documented by Status   Patient Acceptance E,TB,D VU,NR  RW 01/05/18 1525 Done    Acceptance E,TB,D VU,NR   01/04/18 1700 Done    Acceptance E,D VU,NR  EJ 01/02/18 1549 Done    Acceptance D VU,NR   12/31/17 1017 Done    Acceptance E VU  MA 12/29/17 1034 Done    Acceptance E,D NR,VU   12/28/17 1330 Done    Acceptance E,TB,D VU,NR   12/27/17 1617 Done    Acceptance E,TB,D VU,NR   12/26/17 1303 Done               Point: Precautions (Done)     Learning Progress Summary    Learner Readiness Method Response Comment Documented by Status   Patient Acceptance E,TB,D VU,NR   01/05/18 1525 Done    Acceptance E,TB,D VU,NR   01/04/18 1700 Done    Acceptance D VU,NR   12/31/17 1017 Done    Acceptance E VU  MA 12/29/17 1034 Done    Acceptance E,TB,D VU,NR   12/27/17 1617 Done    Acceptance E,TB,D VU,NR   12/26/17 1303 Done                      User Key     Initials Effective Dates Name Provider Type Discipline     05/18/15 -  Marianela Pedro, PT Physical Therapist PT     12/01/15 -  Sheridan Ko, PT Physical Therapist PT     04/21/17 -  Dana Farias, PT Physical Therapist PT     04/06/16 -  Gianna Moore, PTA Physical Therapy Assistant PT    MA 12/13/16 -  Mariann Vang, PT Physical Therapist PT                    PT Recommendation and Plan  Anticipated Discharge Disposition: skilled nursing facility  Planned Therapy Interventions: balance training, bed mobility training, gait training, home exercise program, patient/family education, transfer training  PT Frequency: daily  Plan of Care Review  Plan Of Care Reviewed With: patient  Progress: no change  Outcome Summary/Follow up Plan: Slow moving with PT and resistant to moving but did stand for about 30 seconds today with walker and assist of 2. Needs strong encouragement          Outcome Measures       01/05/18 1500 01/04/18 1700       How much help from another person do you currently need...    Turning from your back to your side while in flat bed without using bedrails? 2  -RW 2  -CH     Moving from lying on back to sitting on the side of a flat bed without bedrails? 2  -RW 2  -CH     Moving to and from a bed to a chair (including a wheelchair)? 1  -RW 1  -CH     Standing up from a chair using your arms (e.g., wheelchair, bedside chair)? 2  -RW 1  -CH     Climbing 3-5 steps with a railing? 1  -RW 1  -CH     To walk in hospital room? 1  -RW 1  -CH     AM-PAC 6 Clicks Score  9  -RW 8  -CH     Functional Assessment    Outcome Measure Options AM-PAC 6 Clicks Basic Mobility (PT)  -RW AM-PAC 6 Clicks Basic Mobility (PT)  -       User Key  (r) = Recorded By, (t) = Taken By, (c) = Cosigned By    Initials Name Provider Type    CH Sheridan Ko, PT Physical Therapist    RW Gianna Moore, PTA Physical Therapy Assistant           Time Calculation:         PT Charges       01/06/18 1701          Time Calculation    Start Time 1635  -ZK      Stop Time 1655  -ZK      Time Calculation (min) 20 min  -ZK      PT Received On 01/06/18  -ZK      PT - Next Appointment 01/07/18  -ZK      PT Goal Re-Cert Due Date 01/11/18  -ZK        User Key  (r) = Recorded By, (t) = Taken By, (c) = Cosigned By    Initials Name Provider Type    ZK Zorre Zeno Kimura, PT Physical Therapist          Therapy Charges for Today     Code Description Service Date Service Provider Modifiers Qty    58893909685 HC GAIT TRAINING EA 15 MIN 1/6/2018 Zorre Zeno Kimura, PT GP 1    32623063156 HC PT THER SUPP EA 15 MIN 1/6/2018 Zorre Zeno Kimura, PT GP 1          PT G-Codes  Outcome Measure Options: AM-PAC 6 Clicks Basic Mobility (PT)    Zorre Zeno Kimura, PT  1/6/2018

## 2018-01-06 NOTE — PROGRESS NOTES
Date of Admission:  12/23/2017  Regulo Bowers MD       LOS: 14 days   Patient Care Team:  Casey Verduzco Jr., MD as PCP - General (Family Medicine)    Subjective     57 y.o. male denies chest pain or shortness of breath.  Increased amounts of pain at the in the right arm.    Review of Systems    Objective     Scheduled Medications:     atorvastatin 40 mg Oral Daily   enoxaparin 40 mg Subcutaneous Q24H   glipiZIDE 10 mg Oral QAM AC   insulin aspart 2-5 Units Subcutaneous 4x Daily AC & at Bedtime   ipratropium-albuterol 3 mL Nebulization 4x Daily - RT   metFORMIN 500 mg Oral BID With Meals   metoprolol tartrate 50 mg Oral Q8H   penicillin g (potassium) 4 Million Units Intravenous Q4H       Active Infusions:    lactated ringers 75 mL/hr Last Rate: 75 mL/hr (01/05/18 0248)       As Needed Medications:  •  acetaminophen **OR** acetaminophen  •  bisacodyl  •  dextrose  •  dextrose  •  docusate sodium  •  glucagon (human recombinant)  •  HYDROcodone-acetaminophen  •  influenza vaccine  •  magnesium sulfate **OR** magnesium sulfate in D5W 1g/100mL (PREMIX)  •  naloxone  •  ondansetron  •  ondansetron **OR** ondansetron ODT **OR** ondansetron  •  oxyCODONE-acetaminophen  •  potassium chloride **OR** potassium chloride **OR** potassium chloride  •  sodium chloride    Vital Signs  Vital Signs Patient Vitals for the past 24 hrs:   BP Temp Temp src Pulse Resp SpO2 Weight   01/06/18 0800 116/86 97.6 °F (36.4 °C) Oral 86 18 96 % -   01/06/18 0004 113/71 97.9 °F (36.6 °C) Oral 77 18 95 % -   01/05/18 2101 - - - 87 20 93 % -   01/05/18 1950 117/69 97.8 °F (36.6 °C) Oral 85 22 92 % -   01/05/18 1616 - - - 85 20 95 % -   01/05/18 1203 - - - 85 20 95 % -     I/O:  I/O last 3 completed shifts:  In: 1070 [I.V.:1070]  Out: 2145 [Urine:2040; Other:105]    Physical Exam:  Physical Exam   Constitutional: He is oriented to person, place, and time. He appears well-developed.   Pulmonary/Chest: Effort normal. No respiratory distress.    Abdominal: Soft. He exhibits no distension.   Neurological: He is alert and oriented to person, place, and time.    no subcutaneous emphysema overlying right arm infection.  There does appear to be a fluctuant mass in the area of concern.    Results Review:     CBC    Results from last 7 days  Lab Units 01/04/18  1246 01/04/18  0527 01/03/18 2013 01/03/18  0608 01/02/18  0727 01/01/18  0821 12/31/17  0604   WBC 10*3/mm3 9.33 10.31  --  9.35 8.47 9.60 11.49*   HEMOGLOBIN g/dL 9.4* 9.3* 9.4* 11.5* 11.1* 11.4* 11.4*   PLATELETS 10*3/mm3 316 367  --  343 327 331 310     BMP   Results from last 7 days  Lab Units 01/04/18  0527 01/03/18  0608   SODIUM mmol/L 133* 134*   POTASSIUM mmol/L 5.0 4.0   CHLORIDE mmol/L 97* 98   CO2 mmol/L 24.0 26.6   BUN mg/dL 24* 14   CREATININE mg/dL 1.18 0.58*   GLUCOSE mg/dL 147* 151*     Cr Clearance Estimated Creatinine Clearance: 98.7 mL/min (by C-G formula based on Cr of 1.18).  Coag     HbA1C   Lab Results   Component Value Date    HGBA1C 11.44 (H) 12/23/2017     Blood Glucose   Glucose   Date/Time Value Ref Range Status   01/06/2018 0607 125 70 - 130 mg/dL Final   01/05/2018 2113 69 (L) 70 - 130 mg/dL Final   01/05/2018 1720 76 70 - 130 mg/dL Final   01/05/2018 1144 130 70 - 130 mg/dL Final   01/04/2018 2030 172 (H) 70 - 130 mg/dL Final   01/04/2018 1520 169 (H) 70 - 130 mg/dL Final   01/04/2018 1111 221 (H) 70 - 130 mg/dL Final   01/04/2018 0629 167 (H) 70 - 130 mg/dL Final     Micro   Results from last 7 days  Lab Units 01/03/18  1656 01/03/18  1642 01/03/18  1639   WOUNDCX   --   --    Rare Streptococcus agalactiae (Group B)*   GRAM STAIN RESULT  Rare (1+) WBCs seen  No organisms seen Moderate (3+) Red blood cells  Rare (1+) WBCs seen  No organisms seen Moderate (3+) Red blood cells  Rare (1+) WBCs seen  Occasional Gram positive cocci     ESE  No results found for: POCMETH, POCAMPHET, POCBARBITUR, POCBENZO, POCCOCAINE, POCOPIATES, POCOXYCODO, POCPHENCYC, POCPROPOXY, POCTHC,  POCTRICYC    Assessment/Plan     Assessment & Plan    Principal Problem:    Sepsis  Active Problems:    Abnormal MRI, lumbar spine    Diabetes    Abscess in epidural space of lumbar spine    Abscess in epidural space of thoracic spine    Postoperative anemia due to acute blood loss    Type 2 diabetes mellitus without complication    Photograph of arm on 1/3/2018.      Upper extremity venous duplex performed on 1/3/2018.  Interpretation Summary   · Normal right upper extremity venous duplex scan.  · Extensive soft tissue edema without fluid collection is seen.     57 y.o. male right arm cellulitis with now a clinical abscess.  We'll plan on operative incision and drainage with debridement tomorrow morning in the operating room.  Patient remains nontoxic and is afebrile with a normal white blood cell count on penicillin G.  We will send operative cultures tomorrow.    Afebrile with normal white blood cell count.  On penicillin G.    Regulo Bowers MD  01/06/18  9:02 AM    Please call my office with any question: [phone]    Active Hospital Problems (** Indicates Principal Problem)    Diagnosis Date Noted   • **Sepsis [A41.9] 12/23/2017   • Postoperative anemia due to acute blood loss [D62] 01/04/2018   • Type 2 diabetes mellitus without complication [E11.9] 01/04/2018   • Abscess in epidural space of lumbar spine [G06.1] 01/02/2018   • Abscess in epidural space of thoracic spine [G06.1] 01/02/2018   • Abnormal MRI, lumbar spine [R93.7] 12/29/2017   • Diabetes [E11.9] 12/29/2017      Resolved Hospital Problems    Diagnosis Date Noted Date Resolved   No resolved problems to display.

## 2018-01-07 ENCOUNTER — ANESTHESIA (OUTPATIENT)
Dept: PERIOP | Facility: HOSPITAL | Age: 58
End: 2018-01-07

## 2018-01-07 ENCOUNTER — ANESTHESIA EVENT (OUTPATIENT)
Dept: PERIOP | Facility: HOSPITAL | Age: 58
End: 2018-01-07

## 2018-01-07 LAB
GLUCOSE BLDC GLUCOMTR-MCNC: 115 MG/DL (ref 70–130)
GLUCOSE BLDC GLUCOMTR-MCNC: 116 MG/DL (ref 70–130)
GLUCOSE BLDC GLUCOMTR-MCNC: 125 MG/DL (ref 70–130)
GLUCOSE BLDC GLUCOMTR-MCNC: 66 MG/DL (ref 70–130)
GLUCOSE BLDC GLUCOMTR-MCNC: 71 MG/DL (ref 70–130)

## 2018-01-07 PROCEDURE — 25010000002 PROPOFOL 10 MG/ML EMULSION: Performed by: NURSE ANESTHETIST, CERTIFIED REGISTERED

## 2018-01-07 PROCEDURE — 94799 UNLISTED PULMONARY SVC/PX: CPT

## 2018-01-07 PROCEDURE — 25010000002 ROPIVACAINE PER 1 MG: Performed by: ANESTHESIOLOGY

## 2018-01-07 PROCEDURE — 25010000003 PENICILLIN G POTASSIUM PER 600000 UNITS: Performed by: INTERNAL MEDICINE

## 2018-01-07 PROCEDURE — 25010000002 MIDAZOLAM PER 1 MG: Performed by: ANESTHESIOLOGY

## 2018-01-07 PROCEDURE — 82962 GLUCOSE BLOOD TEST: CPT

## 2018-01-07 PROCEDURE — 87205 SMEAR GRAM STAIN: CPT | Performed by: SURGERY

## 2018-01-07 PROCEDURE — 99232 SBSQ HOSP IP/OBS MODERATE 35: CPT | Performed by: INTERNAL MEDICINE

## 2018-01-07 PROCEDURE — 87075 CULTR BACTERIA EXCEPT BLOOD: CPT | Performed by: SURGERY

## 2018-01-07 PROCEDURE — 0J9G00Z DRAINAGE OF RIGHT LOWER ARM SUBCUTANEOUS TISSUE AND FASCIA WITH DRAINAGE DEVICE, OPEN APPROACH: ICD-10-PCS | Performed by: SURGERY

## 2018-01-07 PROCEDURE — P9612 CATHETERIZE FOR URINE SPEC: HCPCS

## 2018-01-07 PROCEDURE — 25010000002 MORPHINE PER 10 MG: Performed by: INTERNAL MEDICINE

## 2018-01-07 PROCEDURE — 87070 CULTURE OTHR SPECIMN AEROBIC: CPT | Performed by: SURGERY

## 2018-01-07 PROCEDURE — 25010000002 ENOXAPARIN PER 10 MG: Performed by: INTERNAL MEDICINE

## 2018-01-07 PROCEDURE — 25010000002 FENTANYL CITRATE (PF) 100 MCG/2ML SOLUTION: Performed by: ANESTHESIOLOGY

## 2018-01-07 RX ORDER — MIDAZOLAM HYDROCHLORIDE 1 MG/ML
2 INJECTION INTRAMUSCULAR; INTRAVENOUS
Status: DISCONTINUED | OUTPATIENT
Start: 2018-01-07 | End: 2018-01-07

## 2018-01-07 RX ORDER — PROMETHAZINE HYDROCHLORIDE 25 MG/1
25 SUPPOSITORY RECTAL ONCE AS NEEDED
Status: DISCONTINUED | OUTPATIENT
Start: 2018-01-07 | End: 2018-01-07

## 2018-01-07 RX ORDER — FAMOTIDINE 10 MG/ML
20 INJECTION, SOLUTION INTRAVENOUS ONCE
Status: DISCONTINUED | OUTPATIENT
Start: 2018-01-07 | End: 2018-01-07 | Stop reason: SDUPTHER

## 2018-01-07 RX ORDER — LIDOCAINE HYDROCHLORIDE 10 MG/ML
0.5 INJECTION, SOLUTION EPIDURAL; INFILTRATION; INTRACAUDAL; PERINEURAL ONCE AS NEEDED
Status: DISCONTINUED | OUTPATIENT
Start: 2018-01-07 | End: 2018-01-07

## 2018-01-07 RX ORDER — PROPOFOL 10 MG/ML
VIAL (ML) INTRAVENOUS
Status: COMPLETED
Start: 2018-01-07 | End: 2018-01-07

## 2018-01-07 RX ORDER — NALOXONE HCL 0.4 MG/ML
0.2 VIAL (ML) INJECTION AS NEEDED
Status: DISCONTINUED | OUTPATIENT
Start: 2018-01-07 | End: 2018-01-07

## 2018-01-07 RX ORDER — SODIUM CHLORIDE 0.9 % (FLUSH) 0.9 %
1-10 SYRINGE (ML) INJECTION AS NEEDED
Status: DISCONTINUED | OUTPATIENT
Start: 2018-01-07 | End: 2018-01-07

## 2018-01-07 RX ORDER — ONDANSETRON 2 MG/ML
4 INJECTION INTRAMUSCULAR; INTRAVENOUS ONCE AS NEEDED
Status: DISCONTINUED | OUTPATIENT
Start: 2018-01-07 | End: 2018-01-07

## 2018-01-07 RX ORDER — MIDAZOLAM HYDROCHLORIDE 5 MG/ML
INJECTION INTRAMUSCULAR; INTRAVENOUS
Status: COMPLETED | OUTPATIENT
Start: 2018-01-07 | End: 2018-01-07

## 2018-01-07 RX ORDER — FENTANYL CITRATE 50 UG/ML
100 INJECTION, SOLUTION INTRAMUSCULAR; INTRAVENOUS
Status: DISCONTINUED | OUTPATIENT
Start: 2018-01-07 | End: 2018-01-07

## 2018-01-07 RX ORDER — PROMETHAZINE HYDROCHLORIDE 25 MG/ML
12.5 INJECTION, SOLUTION INTRAMUSCULAR; INTRAVENOUS ONCE AS NEEDED
Status: DISCONTINUED | OUTPATIENT
Start: 2018-01-07 | End: 2018-01-07

## 2018-01-07 RX ORDER — FLUMAZENIL 0.1 MG/ML
0.2 INJECTION INTRAVENOUS AS NEEDED
Status: DISCONTINUED | OUTPATIENT
Start: 2018-01-07 | End: 2018-01-07

## 2018-01-07 RX ORDER — PROMETHAZINE HYDROCHLORIDE 25 MG/1
12.5 TABLET ORAL ONCE AS NEEDED
Status: DISCONTINUED | OUTPATIENT
Start: 2018-01-07 | End: 2018-01-07

## 2018-01-07 RX ORDER — PROPOFOL 10 MG/ML
VIAL (ML) INTRAVENOUS AS NEEDED
Status: DISCONTINUED | OUTPATIENT
Start: 2018-01-07 | End: 2018-01-07 | Stop reason: SURG

## 2018-01-07 RX ORDER — MIDAZOLAM HYDROCHLORIDE 1 MG/ML
1 INJECTION INTRAMUSCULAR; INTRAVENOUS
Status: DISCONTINUED | OUTPATIENT
Start: 2018-01-07 | End: 2018-01-07

## 2018-01-07 RX ORDER — EPHEDRINE SULFATE 50 MG/ML
5 INJECTION, SOLUTION INTRAVENOUS ONCE AS NEEDED
Status: DISCONTINUED | OUTPATIENT
Start: 2018-01-07 | End: 2018-01-07

## 2018-01-07 RX ORDER — ROPIVACAINE HYDROCHLORIDE 5 MG/ML
INJECTION, SOLUTION EPIDURAL; INFILTRATION; PERINEURAL AS NEEDED
Status: DISCONTINUED | OUTPATIENT
Start: 2018-01-07 | End: 2018-01-07 | Stop reason: SURG

## 2018-01-07 RX ORDER — FENTANYL CITRATE 50 UG/ML
50 INJECTION, SOLUTION INTRAMUSCULAR; INTRAVENOUS
Status: DISCONTINUED | OUTPATIENT
Start: 2018-01-07 | End: 2018-01-07

## 2018-01-07 RX ORDER — GLYCOPYRROLATE 0.2 MG/ML
INJECTION INTRAMUSCULAR; INTRAVENOUS AS NEEDED
Status: DISCONTINUED | OUTPATIENT
Start: 2018-01-07 | End: 2018-01-07 | Stop reason: SURG

## 2018-01-07 RX ORDER — HYDROCODONE BITARTRATE AND ACETAMINOPHEN 7.5; 325 MG/1; MG/1
1 TABLET ORAL ONCE AS NEEDED
Status: DISCONTINUED | OUTPATIENT
Start: 2018-01-07 | End: 2018-01-07

## 2018-01-07 RX ORDER — FAMOTIDINE 10 MG/ML
20 INJECTION, SOLUTION INTRAVENOUS ONCE
Status: DISCONTINUED | OUTPATIENT
Start: 2018-01-07 | End: 2018-01-07

## 2018-01-07 RX ORDER — HYDRALAZINE HYDROCHLORIDE 20 MG/ML
5 INJECTION INTRAMUSCULAR; INTRAVENOUS
Status: DISCONTINUED | OUTPATIENT
Start: 2018-01-07 | End: 2018-01-07

## 2018-01-07 RX ORDER — MAGNESIUM HYDROXIDE 1200 MG/15ML
LIQUID ORAL AS NEEDED
Status: DISCONTINUED | OUTPATIENT
Start: 2018-01-07 | End: 2018-01-07 | Stop reason: HOSPADM

## 2018-01-07 RX ORDER — HYDROMORPHONE HYDROCHLORIDE 1 MG/ML
0.5 INJECTION, SOLUTION INTRAMUSCULAR; INTRAVENOUS; SUBCUTANEOUS
Status: DISCONTINUED | OUTPATIENT
Start: 2018-01-07 | End: 2018-01-07

## 2018-01-07 RX ORDER — PROPOFOL 10 MG/ML
VIAL (ML) INTRAVENOUS CONTINUOUS PRN
Status: DISCONTINUED | OUTPATIENT
Start: 2018-01-07 | End: 2018-01-07 | Stop reason: SURG

## 2018-01-07 RX ORDER — DIPHENHYDRAMINE HYDROCHLORIDE 50 MG/ML
12.5 INJECTION INTRAMUSCULAR; INTRAVENOUS
Status: DISCONTINUED | OUTPATIENT
Start: 2018-01-07 | End: 2018-01-07

## 2018-01-07 RX ORDER — OXYCODONE AND ACETAMINOPHEN 7.5; 325 MG/1; MG/1
1 TABLET ORAL ONCE AS NEEDED
Status: DISCONTINUED | OUTPATIENT
Start: 2018-01-07 | End: 2018-01-07

## 2018-01-07 RX ORDER — PROMETHAZINE HYDROCHLORIDE 25 MG/1
25 TABLET ORAL ONCE AS NEEDED
Status: DISCONTINUED | OUTPATIENT
Start: 2018-01-07 | End: 2018-01-07

## 2018-01-07 RX ORDER — LABETALOL HYDROCHLORIDE 5 MG/ML
5 INJECTION, SOLUTION INTRAVENOUS
Status: DISCONTINUED | OUTPATIENT
Start: 2018-01-07 | End: 2018-01-07

## 2018-01-07 RX ORDER — FENTANYL CITRATE 50 UG/ML
INJECTION, SOLUTION INTRAMUSCULAR; INTRAVENOUS
Status: COMPLETED | OUTPATIENT
Start: 2018-01-07 | End: 2018-01-07

## 2018-01-07 RX ORDER — SODIUM CHLORIDE, SODIUM LACTATE, POTASSIUM CHLORIDE, CALCIUM CHLORIDE 600; 310; 30; 20 MG/100ML; MG/100ML; MG/100ML; MG/100ML
9 INJECTION, SOLUTION INTRAVENOUS CONTINUOUS
Status: DISCONTINUED | OUTPATIENT
Start: 2018-01-07 | End: 2018-01-07

## 2018-01-07 RX ADMIN — METOPROLOL TARTRATE 50 MG: 50 TABLET, FILM COATED ORAL at 21:30

## 2018-01-07 RX ADMIN — ATORVASTATIN CALCIUM 40 MG: 20 TABLET, FILM COATED ORAL at 10:37

## 2018-01-07 RX ADMIN — OXYCODONE HYDROCHLORIDE AND ACETAMINOPHEN 1 TABLET: 10; 325 TABLET ORAL at 21:30

## 2018-01-07 RX ADMIN — SODIUM CHLORIDE, POTASSIUM CHLORIDE, SODIUM LACTATE AND CALCIUM CHLORIDE 9 ML/HR: 600; 310; 30; 20 INJECTION, SOLUTION INTRAVENOUS at 07:26

## 2018-01-07 RX ADMIN — IPRATROPIUM BROMIDE AND ALBUTEROL SULFATE 3 ML: .5; 3 SOLUTION RESPIRATORY (INHALATION) at 12:04

## 2018-01-07 RX ADMIN — FENTANYL CITRATE 50 MCG: 50 INJECTION INTRAMUSCULAR; INTRAVENOUS at 07:23

## 2018-01-07 RX ADMIN — SODIUM CHLORIDE 4 MILLION UNITS: 0.9 INJECTION INTRAVENOUS at 05:32

## 2018-01-07 RX ADMIN — SODIUM CHLORIDE 4 MILLION UNITS: 0.9 INJECTION INTRAVENOUS at 01:12

## 2018-01-07 RX ADMIN — ROPIVACAINE HYDROCHLORIDE 30 ML: 5 INJECTION, SOLUTION EPIDURAL; INFILTRATION; PERINEURAL at 07:30

## 2018-01-07 RX ADMIN — OXYCODONE HYDROCHLORIDE AND ACETAMINOPHEN 1 TABLET: 10; 325 TABLET ORAL at 05:32

## 2018-01-07 RX ADMIN — SODIUM CHLORIDE 4 MILLION UNITS: 0.9 INJECTION INTRAVENOUS at 21:30

## 2018-01-07 RX ADMIN — PROPOFOL 100 MCG/KG/MIN: 10 INJECTION, EMULSION INTRAVENOUS at 07:55

## 2018-01-07 RX ADMIN — IPRATROPIUM BROMIDE AND ALBUTEROL SULFATE 3 ML: .5; 3 SOLUTION RESPIRATORY (INHALATION) at 21:01

## 2018-01-07 RX ADMIN — GLYCOPYRROLATE 700 MCG: 0.2 INJECTION INTRAMUSCULAR; INTRAVENOUS at 08:23

## 2018-01-07 RX ADMIN — OXYCODONE HYDROCHLORIDE AND ACETAMINOPHEN 1 TABLET: 10; 325 TABLET ORAL at 15:11

## 2018-01-07 RX ADMIN — METFORMIN HYDROCHLORIDE 500 MG: 500 TABLET ORAL at 18:21

## 2018-01-07 RX ADMIN — MIDAZOLAM HYDROCHLORIDE 1 MG: 5 INJECTION, SOLUTION INTRAMUSCULAR; INTRAVENOUS at 07:25

## 2018-01-07 RX ADMIN — PROPOFOL 50 MG: 10 INJECTION, EMULSION INTRAVENOUS at 07:55

## 2018-01-07 RX ADMIN — IPRATROPIUM BROMIDE AND ALBUTEROL SULFATE 3 ML: .5; 3 SOLUTION RESPIRATORY (INHALATION) at 16:11

## 2018-01-07 RX ADMIN — MORPHINE SULFATE 4 MG: 4 INJECTION INTRAVENOUS at 23:16

## 2018-01-07 RX ADMIN — SODIUM CHLORIDE, POTASSIUM CHLORIDE, SODIUM LACTATE AND CALCIUM CHLORIDE: 600; 310; 30; 20 INJECTION, SOLUTION INTRAVENOUS at 07:50

## 2018-01-07 RX ADMIN — SODIUM CHLORIDE 4 MILLION UNITS: 0.9 INJECTION INTRAVENOUS at 14:22

## 2018-01-07 RX ADMIN — METFORMIN HYDROCHLORIDE 500 MG: 500 TABLET ORAL at 10:37

## 2018-01-07 RX ADMIN — MIDAZOLAM HYDROCHLORIDE 1 MG: 5 INJECTION, SOLUTION INTRAMUSCULAR; INTRAVENOUS at 07:23

## 2018-01-07 RX ADMIN — METOPROLOL TARTRATE 50 MG: 50 TABLET, FILM COATED ORAL at 10:38

## 2018-01-07 RX ADMIN — METOPROLOL TARTRATE 50 MG: 50 TABLET, FILM COATED ORAL at 01:12

## 2018-01-07 RX ADMIN — GLIPIZIDE 10 MG: 10 TABLET ORAL at 10:37

## 2018-01-07 RX ADMIN — SODIUM CHLORIDE 4 MILLION UNITS: 0.9 INJECTION INTRAVENOUS at 18:22

## 2018-01-07 RX ADMIN — ENOXAPARIN SODIUM 40 MG: 40 INJECTION SUBCUTANEOUS at 21:30

## 2018-01-07 RX ADMIN — OXYCODONE HYDROCHLORIDE AND ACETAMINOPHEN 1 TABLET: 10; 325 TABLET ORAL at 10:37

## 2018-01-07 NOTE — PROGRESS NOTES
57 y.o.   LOS: 15 days   Patient Care Team:  Casey Verduzco Jr., MD as PCP - General (Family Medicine)    Chief Complaint:  Hyperglycemia      Subjective   Pt has some back pain. BG well controlled. No nausea or vomiting.     Interval History:    Review of Systems   Constitutional: Positive for appetite change and fatigue.   Cardiovascular: Negative for chest pain.   Endocrine: Negative for polydipsia and polyuria.   Musculoskeletal: Positive for back pain and myalgias.   Neurological: Positive for weakness and numbness.     Objective     Vital Signs   Temp:  [97.6 °F (36.4 °C)-98.6 °F (37 °C)] 97.9 °F (36.6 °C)  Heart Rate:  [69-95] 77  Resp:  [16-24] 18  BP: (101-132)/(59-79) 120/77      Physical Exam   Gen exam - alert and oriented x 3, obese male, not in distress.   HEENT - Acanthosis nigricans. Thyroid palpable.   Resp - Clear to auscultation.   CVS - S1,S2 heard and no murmurs.   Abd - Non tender, BS heard.   Ext - No edema.     Results Review:     I reviewed the patient's new clinical results.      No results found for: GLUCOSE  Lab Results (last 72 hours)     Procedure Component Value Units Date/Time    POC Glucose Once [[id number]]  (Abnormal) Collected:  01/04/18 2030    Specimen:  Blood Updated:  01/04/18 2033     Glucose 172 (H) mg/dL     Narrative:       Meter: [id number] : [id number] Alex Gilliland RN    POC Glucose Once [[id number]]  (Normal) Collected:  01/05/18 1144    Specimen:  Blood Updated:  01/05/18 1151     Glucose 130 mg/dL     Narrative:       Meter: [id number] : [id number] David HDZ    Tissue Pathology Exam - Tissue, Spine, Thoracic [[id number]] Collected:  01/03/18 1710    Specimen:  Tissue from Spine, Thoracic Updated:  01/05/18 1327     Case Report --     Surgical Pathology Report                         Case: [id number]                                  Authorizing Provider:  Nghia Esparza MD       Collected:           01/03/2018 05:10 PM          Ordering  "Location:     [facility]  Received:            01/03/2018 09:33 PM                                 MAIN OR                                                                      Pathologist:           Alberto Simms MD                                                        Specimen:    Spine, Thoracic, EPIDURAL SPACE TISSUE                                                      Final Diagnosis --     1.  THORACIC SPINE, EPIDURAL SPACE, BIOPSIES:            FIBROCARTILAGINOUS, BONE  AND LIGAMENTOUS TISSUES WITH FOCAL SEVERE MIXED                   INFLAMMATION/ GRANULATION TISSUE (CONSISTENT WITH ABSCESS).    JOSH/th  IHC/a/THM    CPT CODES:  1. 18849, 82567           Gross Description --     Received in formalin labeled \"spine, thoracic, epidural space tissue\" is a 1 x 1 x 0.8 cm aggregate of tan to brown fragments of bone and fibrous tissue that are submitted all in a single block labeled 1A after decalcification.    CC/USO/JOSH/rezae        Microscopic Description --     Performed, incorporated in diagnosis.          Embedded Images --    POC Glucose Once [[id number]]  (Normal) Collected:  01/05/18 1720    Specimen:  Blood Updated:  01/05/18 1724     Glucose 76 mg/dL     Narrative:       Meter: [id number] : [id number] David HDZ    POC Glucose Once [[id number]]  (Abnormal) Collected:  01/05/18 2113    Specimen:  Blood Updated:  01/05/18 2115     Glucose 69 (L) mg/dL     Narrative:       Meter: [id number] : [id number] Maryuri HDZ    POC Glucose Once [[id number]]  (Normal) Collected:  01/06/18 0607    Specimen:  Blood Updated:  01/06/18 0609     Glucose 125 mg/dL     Narrative:       Meter: [id number] : [id number] Maryuri HDZ    Tissue / Bone Culture - Tissue, Spine, Thoracic [[id number]]  (Abnormal) Collected:  01/03/18 1642    Specimen:  Tissue from Spine, Thoracic Updated:  01/06/18 0817     Tissue Culture --      Rare Streptococcus agalactiae (Group B) (A)      This " organism is considered to be universally susceptible to penicillin.  No further antibiotic testing will be performed.        STREP GROUPING B     Gram Stain Result Moderate (3+) Red blood cells      Rare (1+) WBCs seen      No organisms seen    Wound Culture - Wound, Spine, Thoracic [[id number]]  (Abnormal) Collected:  01/03/18 1639    Specimen:  Wound from Spine, Thoracic Updated:  01/06/18 0825     Wound Culture --      Rare Streptococcus agalactiae (Group B) (A)      This organism is considered to be universally susceptible to penicillin.  No further antibiotic testing will be performed.        STREP GROUPING B     Gram Stain Result Moderate (3+) Red blood cells      Rare (1+) WBCs seen      Occasional Gram positive cocci    Tissue / Bone Culture - Tissue, Spine, Thoracic [[id number]]  (Abnormal) Collected:  01/03/18 1656    Specimen:  Tissue from Spine, Thoracic Updated:  01/06/18 0827     Tissue Culture --      Rare Streptococcus agalactiae (Group B) (A)      This organism is considered to be universally susceptible to penicillin.  No further antibiotic testing will be performed.        STREP GROUPING B     Gram Stain Result Rare (1+) WBCs seen      No organisms seen    Anaerobic Culture - Swab, Spine, Thoracic [[id number]]  (Normal) Collected:  01/03/18 1639    Specimen:  Swab from Spine, Thoracic Updated:  01/06/18 1034     Culture No anaerobes isolated at 3 days    AFB Culture - Tissue, Spine, Thoracic [[id number]] Collected:  01/03/18 1642    Specimen:  Tissue from Spine, Thoracic Updated:  01/06/18 1034     AFB Stain No acid fast bacilli seen on direct smear    Anaerobic Culture - Tissue, Spine, Thoracic [[id number]]  (Normal) Collected:  01/03/18 1642    Specimen:  Tissue from Spine, Thoracic Updated:  01/06/18 1034     Culture No anaerobes isolated at 3 days    Anaerobic Culture - Tissue, Spine, Thoracic [[id number]]  (Normal) Collected:  01/03/18 1656    Specimen:  Tissue from Spine, Thoracic Updated:   01/06/18 1034     Culture No anaerobes isolated at 3 days    POC Glucose Once [[id number]]  (Normal) Collected:  01/06/18 1103    Specimen:  Blood Updated:  01/06/18 1104     Glucose 109 mg/dL     Narrative:       Meter: [id number] : [id number] Jamilah Carmen NA    Needle Stick Pt Source [[id number]] Collected:  01/04/18 1151    Specimen:  Blood Updated:  01/06/18 1307    Narrative:       The following orders were created for panel order Needle Stick Pt Source.  Procedure                               Abnormality         Status                     ---------                               -----------         ------                     HCV RNA, PCR, Qualitative[[id number]]    Abnormal            Final result               Hepatitis B Surface Antigen[[id number]]  Normal              Final result               HIV-1 / O / 2 Ag / Antib...[[id number]]  Normal              Final result               Hepatitis C Antibody[[id number]]         Abnormal            Final result                 Please view results for these tests on the individual orders.    HCV RNA, PCR, Qualitative [[id number]]  (Abnormal) Collected:  01/04/18 1151    Specimen:  Blood Updated:  01/06/18 1307     Hepatitis C RNA-GUILLERMINA Positive (A)      Positive: HCV RNA Detected       Narrative:       Performed at:  [address] [place] [address]  [id number]  : Talat Hernandez MD, Phone:  [phone]    POC Glucose Once [[id number]]  (Normal) Collected:  01/06/18 1717    Specimen:  Blood Updated:  01/06/18 1719     Glucose 113 mg/dL     Narrative:       Meter: [id number] : [id number] Jamilah Carmen NA    POC Glucose Once [[id number]]  (Normal) Collected:  01/06/18 2102    Specimen:  Blood Updated:  01/06/18 2103     Glucose 104 mg/dL     Narrative:       Meter: [id number] : [id number] Maryuri Vogt NA    POC Glucose Once [[id number]]  (Normal) Collected:  01/07/18 0544    Specimen:  Blood Updated:  01/07/18 0546     Glucose 116  mg/dL     Narrative:       Meter: [id number] : [id number] Maryuri HDZ    Anaerobic Culture - Wound, Arm, Right [[id number]] Collected:  01/07/18 0808    Specimen:  Wound from Arm, Right Updated:  01/07/18 0829    Wound Culture - Wound, Arm, Right [[id number]] Collected:  01/07/18 0808    Specimen:  Wound from Arm, Right Updated:  01/07/18 0829    POC Glucose Once [[id number]]  (Normal) Collected:  01/07/18 0830    Specimen:  Blood Updated:  01/07/18 0832     Glucose 115 mg/dL     Narrative:       Meter: [id number] : [id number] Baldomero HERRMANN    POC Glucose Once [[id number]]  (Normal) Collected:  01/07/18 1115    Specimen:  Blood Updated:  01/07/18 1117     Glucose 125 mg/dL     Narrative:       Meter: [id number] : [id number] Smailagic Berina        Imaging Results (last 72 hours)     Procedure Component Value Units Date/Time    FL Video Swallow With Speech [[id number]] Collected:  01/05/18 1015     Updated:  01/05/18 1035    Narrative:       VIDEO SWALLOWING EXAM     HISTORY: Dysphagia.     FINDINGS: There is overall mild to moderate oropharyngeal dysphagia.  Penetration is witnessed without leslie aspiration. Premature spillage of  contrast is present and there is decreased hyolaryngeal function.  Premature spillage is present at the vallecula and piriform sinuses.  Recommendations will be made separately by the speech pathologist (see  report).     Fluoroscopy time 3 minutes 30 seconds.     This report was finalized on 1/5/2018 10:32 AM by Dr. Jose Juan Paz MD.             Medication Review: done      Current Facility-Administered Medications:   •  acetaminophen (TYLENOL) tablet 650 mg, 650 mg, Oral, Q4H PRN, 650 mg at 12/31/17 0516 **OR** acetaminophen (TYLENOL) suppository 650 mg, 650 mg, Rectal, Q4H PRN, Eusebio Farnsworth MD  •  atorvastatin (LIPITOR) tablet 40 mg, 40 mg, Oral, Daily, Urban Mason MD, 40 mg at 01/07/18 1037  •  bisacodyl (DULCOLAX) EC tablet 10 mg, 10 mg,  Oral, Daily PRN, Nghia Esparza MD  •  dextrose (D50W) solution 25 g, 25 g, Intravenous, Q15 Min PRN, Eusebio Farnsworth MD  •  dextrose (GLUTOSE) oral gel 15 g, 15 g, Oral, Q15 Min PRN, Eusebio Farnsworth MD  •  docusate sodium (COLACE) capsule 100 mg, 100 mg, Oral, BID PRN, Nghia Esparza MD  •  enoxaparin (LOVENOX) syringe 40 mg, 40 mg, Subcutaneous, Q24H, Urban Mason MD, 40 mg at 01/06/18 2334  •  glipiZIDE (GLUCOTROL) tablet 10 mg, 10 mg, Oral, QAM AC, Juan Nicholson MD, 10 mg at 01/07/18 1037  •  glucagon (human recombinant) (GLUCAGEN DIAGNOSTIC) injection 1 mg, 1 mg, Subcutaneous, Q15 Min PRN, Eusebio Farnsworth MD  •  HYDROcodone-acetaminophen (NORCO)  MG per tablet 1 tablet, 1 tablet, Oral, Q4H PRN, Nghia Esparza MD, 1 tablet at 01/05/18 1511  •  Influenza Vac Subunit Quad (FLUCELVAX) injection 0.5 mL, 0.5 mL, Intramuscular, During Hospitalization, Eusebio Farnsworth MD  •  insulin aspart (novoLOG) injection 2-5 Units, 2-5 Units, Subcutaneous, 4x Daily AC & at Bedtime, Ever Gongora MD, 2.5 Units at 01/05/18 0913  •  ipratropium-albuterol (DUO-NEB) nebulizer solution 3 mL, 3 mL, Nebulization, 4x Daily - RT, Eusebio Farnsworth MD, 3 mL at 01/07/18 1204  •  lactated ringers infusion, 75 mL/hr, Intravenous, Continuous, Nghia Esparza MD, Last Rate: 75 mL/hr at 01/05/18 0248  •  Magnesium Sulfate 2 gram infusion - Mg less than or equal to 1.5 mg/dL, 2 g, Intravenous, PRN **OR** Magesium Sulfate 1 gram infusion - Mg 1.6-1.9 mg/dL, 1 g, Intravenous, PRN, Eusebio Farnsworth MD  •  metFORMIN (GLUCOPHAGE) tablet 500 mg, 500 mg, Oral, BID With Meals, Juan Nicholson MD, 500 mg at 01/07/18 1037  •  metoprolol tartrate (LOPRESSOR) tablet 50 mg, 50 mg, Oral, Q8H, Jesika Owen, APRN, 50 mg at 01/07/18 1038  •  naloxone (NARCAN) injection 0.1 mg, 0.1 mg, Intravenous, Q5 Min PRN, Nghia Esparza MD  •  ondansetron (ZOFRAN) injection 4 mg, 4 mg, Intravenous, Q6H PRN, Eusebio  Mt Farnsworth MD  •  ondansetron (ZOFRAN) tablet 4 mg, 4 mg, Oral, Q6H PRN **OR** ondansetron ODT (ZOFRAN-ODT) disintegrating tablet 4 mg, 4 mg, Oral, Q6H PRN **OR** ondansetron (ZOFRAN) injection 4 mg, 4 mg, Intravenous, Q6H PRN, Nghia Esparza MD  •  oxyCODONE-acetaminophen (PERCOCET)  MG per tablet 1 tablet, 1 tablet, Oral, Q4H PRN, Chacho Kam MD, 1 tablet at 01/07/18 1511  •  penicillin G potassium 4 Million Units in sodium chloride 0.9 % 100 mL IVPB, 4 Million Units, Intravenous, Q4H, Bowen Ford MD, 4 Million Units at 01/07/18 1422  •  potassium chloride (MICRO-K) CR capsule 40 mEq, 40 mEq, Oral, PRN, 40 mEq at 12/27/17 2316 **OR** potassium chloride (KLOR-CON) packet 40 mEq, 40 mEq, Oral, PRN **OR** potassium chloride 10 mEq in 100 mL IVPB, 10 mEq, Intravenous, Q1H PRN, Eusebio Farnsworth MD, Last Rate: 100 mL/hr at 12/27/17 0241, 10 mEq at 12/27/17 0241  •  sodium chloride 0.9 % flush 1-10 mL, 1-10 mL, Intravenous, PRN, Eusebio Farnsworth MD  •  Sodium Hypochloride 0.0625 % (Dakins 1/8th Strength), 946 mL, Irrigation, Q12H, Regulo Bowers MD    Assessment/Plan     Active Hospital Problems (** Indicates Principal Problem)    Diagnosis Date Noted   • **Sepsis [A41.9] 12/23/2017   • Postoperative anemia due to acute blood loss [D62] 01/04/2018   • Type 2 diabetes mellitus without complication [E11.9] 01/04/2018   • Abscess in epidural space of lumbar spine [G06.1] 01/02/2018   • Abscess in epidural space of thoracic spine [G06.1] 01/02/2018   • Abnormal MRI, lumbar spine [R93.7] 12/29/2017   • Diabetes [E11.9] 12/29/2017      Resolved Hospital Problems    Diagnosis Date Noted Date Resolved   No resolved problems to display.     Type 2 dm   Continue Glipizide 10 mg po daily in the am with BF.   Continue metformin 500 mg po bid.     Hyperlipidemia  Continue lipitor 40 mg po daily.     Rest per primary team.     I am covering the pt over the week end and pt will be seen by   "Alicia on Monday.       Ever Gongora MD.  01/07/18  3:32 PM      EMR Dragon / transcription disclaimer:    \"Dictated utilizing Dragon dictation\".   "

## 2018-01-07 NOTE — OP NOTE
Date of Admission:  12/23/2017  Today's Date:  01/07/18  Regulo Bowers MD    Preoperative Diagnosis:   Right arm abscess along the cephalic artery.    Postoperative Diagnosis:   Same    Procedure Performed:   Incision and drainage of right arm abscess along cephalic arch with 2  incisions roughly 10 inches apart.    Surgeon:   Regulo Bowers MD    Assistant:    None    Anesthesia:   MAC with regional    Estimated Blood Loss:   25-50 cc    Findings:    Large abscess cavity following cephalic vein through the deltopectoral groove.  Thin abscess fluid.    Staff:   Circulator: Alem Lozano RN  Scrub Person: Lashawn Villad    Specimen:   Abscess fluid for culture and sensitivity.    Complications:   none    Dispo:   to PACU    Indication for procedure:   57 y.o. male with poor infections.  Status post spinal operation for osteomyelitis.  Has worsening infection in his right arm following his cephalic vein.  This is become more fluctuant during his course in the hospital.  He was counseled on the risks, benefits, and alternatives to undergoing incision and drainage.  He agreed to the procedure informed consent was obtained.     Description of procedure:   The patient was taken to the operating room.  Anesthesia was administered through the IV in a monitored setting.  Surgical site was prepped and draped in the usual sterile manner.  A full surgical timeout was performed.  The right arm nerve block was tested.  It appeared adequate.  An inch and a half incision was made over the area of maximum fluctuance, which is in the medial aspect of the upper arm.  Yonker was used to sound the abscess cavity.  There was some thin abscess fluid in place.  This was collected and sent as a culture specimen.  Plastic Yonker was able to be tracked up the deltopectoral groove up onto the chest.  I elected to anesthetize the skin overlying the most medial extent of the chest wall on the abscess cavity and make a small 1 inch  counterincision.  The wound itself and the tract was thoroughly irrigated with hydrogen peroxide.  After meticulous hemostasis was obtained, a small Penrose drain was looped through the entirety of the tract and then secured to itself outside creating a loop.  Half inch iodoform packing gauze was used to dress the wound and the tract in its entirety.  The patient tolerated the procedure well.    Regulo Bowers MD  01/07/18     Active Hospital Problems (** Indicates Principal Problem)    Diagnosis Date Noted   • **Sepsis [A41.9] 12/23/2017   • Postoperative anemia due to acute blood loss [D62] 01/04/2018   • Type 2 diabetes mellitus without complication [E11.9] 01/04/2018   • Abscess in epidural space of lumbar spine [G06.1] 01/02/2018   • Abscess in epidural space of thoracic spine [G06.1] 01/02/2018   • Abnormal MRI, lumbar spine [R93.7] 12/29/2017   • Diabetes [E11.9] 12/29/2017      Resolved Hospital Problems    Diagnosis Date Noted Date Resolved   No resolved problems to display.

## 2018-01-07 NOTE — PLAN OF CARE
Problem: Patient Care Overview (Adult)  Goal: Plan of Care Review  Outcome: Ongoing (interventions implemented as appropriate)   01/07/18 0543 01/07/18 0908 01/07/18 1740   Coping/Psychosocial Response Interventions   Plan Of Care Reviewed With --  patient --    Patient Care Overview   Progress no change --  --    Outcome Evaluation   Outcome Summary/Follow up Plan --  --  Pt VSS this shift. Pt had I&D this AM, island dressing in place, to be changed BID starting on 1/8/18. Pt unable to void today, MD notes possibly related to anesthesia, bladder scan and straight cath Q4-6hrs as needed, done twice on this shift. Pt having some pain, however Right arm is still numb from block. Will continue to monitor.     Goal: Adult Individualization and Mutuality  Outcome: Ongoing (interventions implemented as appropriate)    Goal: Discharge Needs Assessment  Outcome: Ongoing (interventions implemented as appropriate)      Problem: Fall Risk (Adult)  Goal: Absence of Falls  Outcome: Ongoing (interventions implemented as appropriate)      Problem: Pressure Ulcer Risk (Antonio Scale) (Adult,Obstetrics,Pediatric)  Goal: Skin Integrity  Outcome: Ongoing (interventions implemented as appropriate)      Problem: Respiratory Insufficiency (Adult)  Goal: Effective Ventilation  Outcome: Ongoing (interventions implemented as appropriate)    Goal: Effective Ventilation  Outcome: Ongoing (interventions implemented as appropriate)      Problem: Diabetes, Type 2 (Adult)  Goal: Signs and Symptoms of Listed Potential Problems Will be Absent or Manageable (Diabetes, Type 2)  Outcome: Ongoing (interventions implemented as appropriate)      Problem: Pain, Acute (Adult)  Goal: Acceptable Pain Control/Comfort Level  Outcome: Ongoing (interventions implemented as appropriate)      Problem: Skin Integrity Impairment, Risk/Actual (Adult)  Goal: Skin Integrity/Wound Healing  Outcome: Ongoing (interventions implemented as appropriate)

## 2018-01-07 NOTE — PLAN OF CARE
Problem: Patient Care Overview (Adult)  Goal: Plan of Care Review  Outcome: Outcome(s) achieved Date Met: 01/07/18 01/07/18 0543   Coping/Psychosocial Response Interventions   Plan Of Care Reviewed With patient   Patient Care Overview   Progress no change   Outcome Evaluation   Outcome Summary/Follow up Plan pt c/o pain, vss. pain meds and abx administered. OR called to pre-op. will continue to monitor.       Problem: Fall Risk (Adult)  Goal: Absence of Falls  Outcome: Ongoing (interventions implemented as appropriate)   01/07/18 0543   Fall Risk (Adult)   Absence of Falls making progress toward outcome       Problem: Pressure Ulcer Risk (Antonio Scale) (Adult,Obstetrics,Pediatric)  Goal: Skin Integrity  Outcome: Ongoing (interventions implemented as appropriate)   01/07/18 0543   Pressure Ulcer Risk (Antonio Scale) (Adult,Obstetrics,Pediatric)   Skin Integrity making progress toward outcome       Problem: Respiratory Insufficiency (Adult)  Goal: Effective Ventilation  Outcome: Ongoing (interventions implemented as appropriate)   01/07/18 0543   Respiratory Insufficiency (Adult)   Effective Ventilation making progress toward outcome       Problem: Pain, Acute (Adult)  Goal: Identify Related Risk Factors and Signs and Symptoms  Outcome: Outcome(s) achieved Date Met: 01/07/18 01/07/18 0543   Pain, Acute   Related Risk Factors (Acute Pain) disease process;fear;infection;knowledge deficit;patient perception;persistent pain;positioning;procedure/treatment;psychosocial factor;surgery   Signs and Symptoms (Acute Pain) alteration in muscle tone;altered time perception;autonomic responses (sympathetic stimulation);BADLs/IADLs reluctance/inability to perform;facial mask of pain/grimace;fatigue/weakness;fear of reinjury;guarding/abnormal posturing/positioning;impaired thought process/concentration;pacing/restlessness;sleep pattern alteration;verbalization of pain descriptors     Goal: Acceptable Pain Control/Comfort  Level  Outcome: Ongoing (interventions implemented as appropriate)   01/07/18 0543   Pain, Acute (Adult)   Acceptable Pain Control/Comfort Level making progress toward outcome       Problem: Skin Integrity Impairment, Risk/Actual (Adult)  Goal: Identify Related Risk Factors and Signs and Symptoms  Outcome: Outcome(s) achieved Date Met: 01/07/18 01/07/18 0543   Skin Integrity Impairment, Risk/Actual   Skin Integrity Impairment, Risk/Actual: Related Risk Factors cognitive impairment;fluid/nutrition status;immobility;infection/disease process;mechanical factors;medication effects;metabolic imbalance;moisture;sensory impairment;skin disorders;tissue perfusion impaired   Signs and Symptoms (Skin Integrity Impairment) edema;erythema nonblanchable     Goal: Skin Integrity/Wound Healing  Outcome: Ongoing (interventions implemented as appropriate)   01/07/18 0543   Skin Integrity Impairment, Risk/Actual (Adult)   Skin Integrity/Wound Healing making progress toward outcome

## 2018-01-07 NOTE — PROGRESS NOTES
Ethan Frank MD                          [phone]      Patient ID:    Name:  Sam Barrett    MRN:  [id number]    1960   57 y.o.  male            Patient Care Team:  Casey Verduzco Jr., MD as PCP - General (Family Medicine)    LOS: 15  HPI:  Follow up sepsis, group B strep septicemia, epidural abscess, infection of cephalic vein, IVDU, New onset DM, HCV  Status post T9 through S1 posterior lumbar decompression of epidural abscess.  Extubated postop   Patient transferred to the floor.  Feels his pain is better controlled.    INTERVAL:  To or for drainage of right arm abscess    Objective     Vital Signs past 24hrs    BP range: BP: (101-132)/(59-79) 120/77  Pulse range: Heart Rate:  [69-95] 69  Resp rate range: Resp:  [16-24] 18  Temp range: Temp (24hrs), Av.9 °F (36.6 °C), Min:97.6 °F (36.4 °C), Max:98.6 °F (37 °C)    O2 Device: nasal cannula       132 kg (291 lb 1.6 oz); Body mass index is 34.51 kg/(m^2).    Intake/Output Summary (Last 24 hours) at 18 1702  Last data filed at 18 1048   Gross per 24 hour   Intake                0 ml   Output             2750 ml   Net            -2750 ml     Exam:  GENERAL:  NAD, Aox3No distress currently.   HEENT:  EOMI, nonicteric sclera, no JVD  PULMONARY:    No resp distress CTAB, no wheeze, no crackles, no rhonchi, symmetric air entry  CARDIAC:  RRR no MRG, S1 S2  ABD: SNTND BS+  EXT: RUE with bandage minimal edema strength 5/5 symmetric today, pulses symmetric 2+   NEURO:  CNs II-XII intact, no focal deficits  SKIN: as above  PSYCH: appropriate mood    Scheduled meds:      atorvastatin 40 mg Oral Daily   enoxaparin 40 mg Subcutaneous Q24H   glipiZIDE 10 mg Oral QAM AC   insulin aspart 2-5 Units Subcutaneous 4x Daily AC & at Bedtime   ipratropium-albuterol 3 mL Nebulization 4x Daily - RT   metFORMIN 500 mg Oral BID With Meals   metoprolol tartrate 50 mg Oral Q8H   penicillin g (potassium) 4 Million Units Intravenous Q4H    sodium hypochlorite 946 mL Irrigation Q12H                     lactated ringers 75 mL/hr Last Rate: 75 mL/hr (01/05/18 0248)       Data Review:      Results from last 7 days  Lab Units 01/04/18  1246 01/04/18  0527 01/03/18 2013 01/03/18  0608   SODIUM mmol/L  --  133*  --  134*   POTASSIUM mmol/L  --  5.0  --  4.0   CHLORIDE mmol/L  --  97*  --  98   CO2 mmol/L  --  24.0  --  26.6   BUN mg/dL  --  24*  --  14   CREATININE mg/dL  --  1.18  --  0.58*   CALCIUM mg/dL  --  9.0  --  8.8   BILIRUBIN mg/dL  --   --   --  1.0   ALK PHOS U/L  --   --   --  91   ALT (SGPT) U/L  --   --   --  42*   AST (SGOT) U/L  --   --   --  37   GLUCOSE mg/dL  --  147*  --  151*   WBC 10*3/mm3 9.33 10.31  --  9.35   HEMOGLOBIN g/dL 9.4* 9.3* 9.4* 11.5*   PLATELETS 10*3/mm3 316 367  --  343       Lab Results   Component Value Date    CALCIUM 9.0 01/04/2018       Results from last 7 days  Lab Units 01/03/18  1656 01/03/18  1642 01/03/18  1639   WOUNDCX   --   --    Rare Streptococcus agalactiae (Group B)*   GRAM STAIN RESULT  Rare (1+) WBCs seen  No organisms seen Moderate (3+) Red blood cells  Rare (1+) WBCs seen  No organisms seen Moderate (3+) Red blood cells  Rare (1+) WBCs seen  Occasional Gram positive cocci         Results Review:    I have reviewed the available laboratory results and reviewed the chest imaging personally    ASSESSMENT:   Acute hypoxic respiratory failure resolved   Strep septicemia/bacteremia   Right Cephalic vein Pylephlebitis  History of IV drug abuse   Cellulitis lower extremity   New onset diabetes mellitus  Chronic narcotic dependence/ abuse  Intermittent SVT   Hepatitis C  Epidural abscess    PLAN:  Postop pain control.    Antibiotics managed by infectious diseases. - appreciate assistance  Insulin per endocrinology  Access/psychiatry  center eval noted   Will follow vascular surgery micro   will likely need rehabilitation at discharge.    DVT ppx   Full Code     Ethan Frank MD  1/7/2018

## 2018-01-07 NOTE — ANESTHESIA PROCEDURE NOTES
Peripheral Block    Patient location during procedure: holding area  Start time: 1/7/2018 7:18 AM  Stop time: 1/7/2018 7:29 AM  Reason for block: at surgeon's request and post-op pain management  Performed by  Anesthesiologist: BRITTANI ORDAZ  Preanesthetic Checklist  Completed: patient identified, site marked, surgical consent, pre-op evaluation, timeout performed, IV checked, risks and benefits discussed and monitors and equipment checked  Prep:  Pt Position: supine  Sterile barriers:cap, gloves, mask and sterile barriers  Prep: ChloraPrep  Patient monitoring: blood pressure monitoring, continuous pulse oximetry and EKG  Procedure  Sedation:yes  Performed under: local infiltration  Guidance:ultrasound guided  ULTRASOUND INTERPRETATION.  Using ultrasound guidance a 22 G gauge needle was placed in close proximity to the brachial plexus nerve, at which point, under ultrasound guidance anesthetic was injected in the area of the nerve and spread of the anesthesia was seen on ultrasound in close proximity thereto.  There were no abnormalities seen on ultrasound; a digital image was taken; and the patient tolerated the procedure with no complications. Images:still images obtained    Laterality:right  Block Type:interscalene  Injection Technique:single-shot  Needle Type:echogenic  Needle Gauge:22 G  Resistance on Injection: less than 15 psi  Medications  Local Injected:ropivacaine 0.5% without epinephrine Local Amount Injected:30mL  Post Assessment  Injection Assessment: negative aspiration for heme, no paresthesia on injection and incremental injection  Patient Tolerance:comfortable throughout block  Complications:no  Additional Notes  rop 30cc

## 2018-01-07 NOTE — ANESTHESIA POSTPROCEDURE EVALUATION
"Patient: Sam Barrett    Procedure Summary     Date Anesthesia Start Anesthesia Stop Room / Location    01/07/18 0750 0827  [facility] / [facility]       Procedure Diagnosis Surgeon Provider    INCISION AND DRAINAGE RIGHT ARM (Right Arm Upper) No diagnosis on file. MD Mp Jimenez MD          Anesthesia Type: regional  Last vitals  BP   108/69 (01/07/18 0845)   Temp   36.7 °C (98 °F) (01/07/18 0826)   Pulse   77 (01/07/18 0845)   Resp   22 (01/07/18 0845)     SpO2   95 % (01/07/18 0845)     Post Anesthesia Care and Evaluation    Patient location during evaluation: PACU  Patient participation: complete - patient participated  Level of consciousness: awake and alert  Pain management: adequate  Airway patency: patent  Anesthetic complications: No anesthetic complications    Cardiovascular status: acceptable  Respiratory status: acceptable  Hydration status: acceptable    Comments: /69  Pulse 77  Temp 36.7 °C (98 °F) (Oral)   Resp 22  Ht 195.6 cm (77.01\")  Wt 132 kg (291 lb 1.6 oz) Comment: bed extension  SpO2 95%  BMI 34.51 kg/m2      "

## 2018-01-07 NOTE — ANESTHESIA PREPROCEDURE EVALUATION
Anesthesia Evaluation     Patient summary reviewed and Nursing notes reviewed   no history of anesthetic complications:         Airway   Mallampati: II  TM distance: >3 FB  Neck ROM: full  no difficulty expected  Dental - normal exam   (+) poor dentition    Pulmonary - negative pulmonary ROS    breath sounds clear to auscultation  (-) shortness of breath, sleep apnea, decreased breath sounds, wheezes  Cardiovascular - normal exam  Exercise tolerance: good (4-7 METS)    Rhythm: regular  Rate: normal    (+) hypertension,   (-) past MI, angina, CHF, orthopnea, PND, DURAN, PVD      Neuro/Psych- negative ROS  (-) seizures, neuromuscular disease, TIA, CVA, dizziness/light headedness, weakness, numbness  GI/Hepatic/Renal/Endo    (+)  hepatitis C, liver disease, diabetes mellitus,     Musculoskeletal     (+) back pain,       ROS comment: Thoracolumbar epidural abscess    Abdominal  - normal exam   Substance History   (+) drug use  (-) alcohol use     OB/GYN negative ob/gyn ROS         Other - negative ROS                                               Anesthesia Plan    ASA 3     regional     intravenous induction   Anesthetic plan and risks discussed with patient.    Plan discussed with CRNA.

## 2018-01-07 NOTE — PROGRESS NOTES
LOS: 15 days   Patient Care Team:  Casey Verduzco Jr., MD as PCP - General (Family Medicine)    Chief Complaint: Back pain    Subjective     History of Present Illness    Subjective    This patient complains of back pain.  He had surgery this morning for an abscess in his arm.    History taken from: patient chart    Objective     Vital Signs  Temp:  [97.6 °F (36.4 °C)-98.6 °F (37 °C)] 97.6 °F (36.4 °C)  Heart Rate:  [74-95] 77  Resp:  [16-24] 16  BP: (101-132)/(59-79) 119/75    Objective    No change in exam    Results Review:     I reviewed the patient's new clinical results.  He is afebrile.  There is no lab and no recorded drainage from his spine drains.    Medication Review: na    Assessment/Plan     Principal Problem:    Sepsis  Active Problems:    Abnormal MRI, lumbar spine    Diabetes    Abscess in epidural space of lumbar spine    Abscess in epidural space of thoracic spine    Postoperative anemia due to acute blood loss    Type 2 diabetes mellitus without complication      Assessment & Plan    I told the patient that I would not make any further recommendations or changes at this time.    Damon Bonilla MD  01/07/18  10:57 AM    Time: na

## 2018-01-08 LAB
BACTERIA SPEC ANAEROBE CULT: NORMAL
GLUCOSE BLDC GLUCOMTR-MCNC: 107 MG/DL (ref 70–130)
GLUCOSE BLDC GLUCOMTR-MCNC: 120 MG/DL (ref 70–130)
GLUCOSE BLDC GLUCOMTR-MCNC: 199 MG/DL (ref 70–130)
GLUCOSE BLDC GLUCOMTR-MCNC: 86 MG/DL (ref 70–130)

## 2018-01-08 PROCEDURE — 25010000002 MORPHINE PER 10 MG: Performed by: INTERNAL MEDICINE

## 2018-01-08 PROCEDURE — 63710000001 INSULIN ASPART PER 5 UNITS: Performed by: INTERNAL MEDICINE

## 2018-01-08 PROCEDURE — 94799 UNLISTED PULMONARY SVC/PX: CPT

## 2018-01-08 PROCEDURE — 82962 GLUCOSE BLOOD TEST: CPT

## 2018-01-08 PROCEDURE — 25010000002 PENICILLIN G POTASSIUM PER 600000 UNITS: Performed by: INTERNAL MEDICINE

## 2018-01-08 PROCEDURE — 25010000003 PENICILLIN G POTASSIUM PER 600000 UNITS: Performed by: INTERNAL MEDICINE

## 2018-01-08 PROCEDURE — 99024 POSTOP FOLLOW-UP VISIT: CPT | Performed by: NURSE PRACTITIONER

## 2018-01-08 PROCEDURE — 25010000002 ENOXAPARIN PER 10 MG: Performed by: INTERNAL MEDICINE

## 2018-01-08 RX ADMIN — OXYCODONE HYDROCHLORIDE AND ACETAMINOPHEN 1 TABLET: 10; 325 TABLET ORAL at 16:08

## 2018-01-08 RX ADMIN — IPRATROPIUM BROMIDE AND ALBUTEROL SULFATE 3 ML: .5; 3 SOLUTION RESPIRATORY (INHALATION) at 09:17

## 2018-01-08 RX ADMIN — SODIUM CHLORIDE 4 MILLION UNITS: 0.9 INJECTION INTRAVENOUS at 06:08

## 2018-01-08 RX ADMIN — ATORVASTATIN CALCIUM 40 MG: 20 TABLET, FILM COATED ORAL at 09:58

## 2018-01-08 RX ADMIN — SODIUM CHLORIDE 4 MILLION UNITS: 0.9 INJECTION INTRAVENOUS at 00:32

## 2018-01-08 RX ADMIN — ENOXAPARIN SODIUM 40 MG: 40 INJECTION SUBCUTANEOUS at 20:16

## 2018-01-08 RX ADMIN — GLIPIZIDE 10 MG: 10 TABLET ORAL at 09:58

## 2018-01-08 RX ADMIN — METOPROLOL TARTRATE 50 MG: 50 TABLET, FILM COATED ORAL at 17:36

## 2018-01-08 RX ADMIN — BISACODYL 10 MG: 5 TABLET, COATED ORAL at 16:08

## 2018-01-08 RX ADMIN — SODIUM CHLORIDE 4 MILLION UNITS: 0.9 INJECTION INTRAVENOUS at 09:58

## 2018-01-08 RX ADMIN — MORPHINE SULFATE 4 MG: 4 INJECTION INTRAVENOUS at 03:12

## 2018-01-08 RX ADMIN — OXYCODONE HYDROCHLORIDE AND ACETAMINOPHEN 1 TABLET: 10; 325 TABLET ORAL at 02:19

## 2018-01-08 RX ADMIN — SODIUM CHLORIDE 4 MILLION UNITS: 0.9 INJECTION INTRAVENOUS at 15:02

## 2018-01-08 RX ADMIN — DOCUSATE SODIUM 100 MG: 100 CAPSULE, LIQUID FILLED ORAL at 16:08

## 2018-01-08 RX ADMIN — OXYCODONE HYDROCHLORIDE AND ACETAMINOPHEN 1 TABLET: 10; 325 TABLET ORAL at 11:07

## 2018-01-08 RX ADMIN — METFORMIN HYDROCHLORIDE 500 MG: 500 TABLET ORAL at 17:36

## 2018-01-08 RX ADMIN — MORPHINE SULFATE 4 MG: 4 INJECTION INTRAVENOUS at 08:40

## 2018-01-08 RX ADMIN — IPRATROPIUM BROMIDE AND ALBUTEROL SULFATE 3 ML: .5; 3 SOLUTION RESPIRATORY (INHALATION) at 12:15

## 2018-01-08 RX ADMIN — DAKIN'S SOLUTION 0.125% (QUARTER STRENGTH) 946 ML: 0.12 SOLUTION at 09:01

## 2018-01-08 RX ADMIN — SODIUM CHLORIDE 4 MILLION UNITS: 0.9 INJECTION INTRAVENOUS at 20:16

## 2018-01-08 RX ADMIN — METOPROLOL TARTRATE 50 MG: 50 TABLET, FILM COATED ORAL at 06:08

## 2018-01-08 RX ADMIN — OXYCODONE HYDROCHLORIDE AND ACETAMINOPHEN 1 TABLET: 10; 325 TABLET ORAL at 21:55

## 2018-01-08 RX ADMIN — OXYCODONE HYDROCHLORIDE AND ACETAMINOPHEN 1 TABLET: 10; 325 TABLET ORAL at 06:55

## 2018-01-08 RX ADMIN — INSULIN ASPART 2 UNITS: 100 INJECTION, SOLUTION INTRAVENOUS; SUBCUTANEOUS at 20:16

## 2018-01-08 RX ADMIN — METFORMIN HYDROCHLORIDE 500 MG: 500 TABLET ORAL at 09:58

## 2018-01-08 NOTE — NURSING NOTE
"Dressing change done with night shift RN. Patient unable to tolerate pulling the packing out, even after 4mg of morphine. Patient screaming out in pain and screaming \"stop, this isn't going to work\". Call out to  Dr. DIANE Frank, who defers pain management to Dr. Fuller. Call out to Dr. Fuller, to let him know patient not tolerating. Dr. Fuller not wanting to change pain medication, but says if unable to tolerate packing with iodoform, to use 4x4 and pack the best we can. 4x4 gauze used on top incision with dakins, iodoform packed about 12in worth on bottom incision. Wrapped with kerilex.   "

## 2018-01-08 NOTE — PROGRESS NOTES
"[facility] PROGRESS NOTE    PATIENT IDENTIFICATION:   Name:  Sam Barrett      MRN:  [id number]     57 y.o.  male               CC:POD #5 s/p T9 through L1 lumbar laminectomies and decompression of epidural abscess/ hx IV drug use/ Hep C/ GBS septicemia      Subjective     Interval History: continues to complain of back pain and leg weakness \"wobbly\"    Objective     Vital signs in last 24 hours:  Temp:  [97.9 °F (36.6 °C)-98.9 °F (37.2 °C)] 98.8 °F (37.1 °C)  Heart Rate:  [69-91] 91  Resp:  [16-22] 16  BP: (120-129)/(69-84) 129/82    Intake/Output last 3 shifts:  I/O last 3 completed shifts:  In: -   Out: 3940 [Urine:3915; Other:25]    Intake/Output this shift:  I/O this shift:  In: 320 [P.O.:320]  Out: 600 [Urine:600]  LION drain #1: 10  LION drain #2: 15      Physical Exam:  General:   Awake, alert, and oriented x 3. NAD  Back:    Midline thoracolumbar incision well approximated with staples; dressing removed and replaced- no R/E/D of     incision; JPs removed this morning and LION sites with small amount of [place] drainage;  Skin:    multiple tattoos all chest/back/arms  Motor:    5/5 tonya LE  Sensory:   intact  Extremities:  Patient is wearing ADRIAN bilaterally; SCD off presently- awaiting transfer [facility]    LABS:    Results from last 7 days  Lab Units 01/04/18  1246 01/04/18 0527 01/03/18 2013 01/03/18  0608   WBC 10*3/mm3 9.33 10.31  --  9.35   HEMOGLOBIN g/dL 9.4* 9.3* 9.4* 11.5*   HEMATOCRIT % 29.8* 29.7* 30.0* 37.6*   PLATELETS 10*3/mm3 316 367  --  343         Results from last 7 days  Lab Units 01/04/18  0527 01/03/18  0608   SODIUM mmol/L 133* 134*   POTASSIUM mmol/L 5.0 4.0   CHLORIDE mmol/L 97* 98   CO2 mmol/L 24.0 26.6   BUN mg/dL 24* 14   CREATININE mg/dL 1.18 0.58*   CALCIUM mg/dL 9.0 8.8   BILIRUBIN mg/dL  --  1.0   ALK PHOS U/L  --  91   ALT (SGPT) U/L  --  42*   AST (SGOT) U/L  --  37   GLUCOSE mg/dL 147* 151*     Lumbar wound- negative anaerobes    IMAGING STUDIES:  No new " imaging       Meds reviewed/changed: Yes    Current Facility-Administered Medications   Medication Dose Route Frequency Provider Last Rate Last Dose   • acetaminophen (TYLENOL) tablet 650 mg  650 mg Oral Q4H PRN Eusebio Farnsworth MD   650 mg at 12/31/17 0516    Or   • acetaminophen (TYLENOL) suppository 650 mg  650 mg Rectal Q4H PRN Eusebio Farnsworth MD       • atorvastatin (LIPITOR) tablet 40 mg  40 mg Oral Daily Urban Mason MD   40 mg at 01/08/18 0958   • bisacodyl (DULCOLAX) EC tablet 10 mg  10 mg Oral Daily PRN Nghia Esparza MD       • dextrose (D50W) solution 25 g  25 g Intravenous Q15 Min PRN Eusebio Farnsworth MD       • dextrose (GLUTOSE) oral gel 15 g  15 g Oral Q15 Min PRN Eusebio Farnsworth MD       • docusate sodium (COLACE) capsule 100 mg  100 mg Oral BID PRN Nghia Esparza MD       • enoxaparin (LOVENOX) syringe 40 mg  40 mg Subcutaneous Q24H Urban Mason MD   40 mg at 01/07/18 2130   • glipiZIDE (GLUCOTROL) tablet 10 mg  10 mg Oral QAM AC Juan Nicholson MD   10 mg at 01/08/18 0958   • glucagon (human recombinant) (GLUCAGEN DIAGNOSTIC) injection 1 mg  1 mg Subcutaneous Q15 Min PRN Eusebio Farnsworth MD       • HYDROcodone-acetaminophen (NORCO)  MG per tablet 1 tablet  1 tablet Oral Q4H PRN Nghia Esparza MD   1 tablet at 01/05/18 1511   • Influenza Vac Subunit Quad (FLUCELVAX) injection 0.5 mL  0.5 mL Intramuscular During Hospitalization Eusebio Farnsworth MD       • insulin aspart (novoLOG) injection 2-5 Units  2-5 Units Subcutaneous 4x Daily AC & at Bedtime Ever Gongora MD   2.5 Units at 01/05/18 0913   • ipratropium-albuterol (DUO-NEB) nebulizer solution 3 mL  3 mL Nebulization 4x Daily - RT Eusebio Farnsworth MD   3 mL at 01/08/18 1215   • lactated ringers infusion  75 mL/hr Intravenous Continuous Nghia E Hodes, MD   Stopped at 01/08/18 0027   • Magnesium Sulfate 2 gram infusion - Mg less than or equal to 1.5 mg/dL  2 g Intravenous PRN Eusebio  Mt Farnsworth MD        Or   • Magesium Sulfate 1 gram infusion - Mg 1.6-1.9 mg/dL  1 g Intravenous PRN Eusebio Farnsworth MD       • metFORMIN (GLUCOPHAGE) tablet 500 mg  500 mg Oral BID With Meals Juan Nicholson MD   500 mg at 01/08/18 0958   • metoprolol tartrate (LOPRESSOR) tablet 50 mg  50 mg Oral Q8H CHATA Hebert   50 mg at 01/08/18 0608   • morphine injection 4 mg  4 mg Intravenous Q4H PRN Yuri Frank MD   4 mg at 01/08/18 0840   • naloxone (NARCAN) injection 0.1 mg  0.1 mg Intravenous Q5 Min PRN Nghia Esparza MD       • ondansetron (ZOFRAN) injection 4 mg  4 mg Intravenous Q6H PRN Eusebio Farnsworth MD       • ondansetron (ZOFRAN) tablet 4 mg  4 mg Oral Q6H PRN Nghia Esparza MD        Or   • ondansetron ODT (ZOFRAN-ODT) disintegrating tablet 4 mg  4 mg Oral Q6H PRN Nghia Esparza MD        Or   • ondansetron (ZOFRAN) injection 4 mg  4 mg Intravenous Q6H PRN Ngiha Esparza MD       • oxyCODONE-acetaminophen (PERCOCET)  MG per tablet 1 tablet  1 tablet Oral Q4H PRN Chacho Kam MD   1 tablet at 01/08/18 1107   • penicillin G potassium 4 Million Units in sodium chloride 0.9 % 100 mL IVPB  4 Million Units Intravenous Q4H Bowen Ford MD   4 Million Units at 01/08/18 0958   • potassium chloride (MICRO-K) CR capsule 40 mEq  40 mEq Oral PRN Eusebio Farnsworth MD   40 mEq at 12/27/17 2316    Or   • potassium chloride (KLOR-CON) packet 40 mEq  40 mEq Oral PRN Eusebio Farnsworth MD        Or   • potassium chloride 10 mEq in 100 mL IVPB  10 mEq Intravenous Q1H PRN Eusebio Farnsworth  mL/hr at 12/27/17 0241 10 mEq at 12/27/17 0241   • sodium chloride 0.9 % flush 1-10 mL  1-10 mL Intravenous PRN Eusebio Farnsworth MD       • Sodium Hypochloride 0.0625 % (Dakins 1/8th Strength)  946 mL Irrigation Q12H Regulo Bowers MD   946 mL at 01/08/18 0901       Current Facility-Administered Medications:   •  acetaminophen (TYLENOL) tablet 650 mg, 650 mg, Oral, Q4H PRN,  650 mg at 12/31/17 0516 **OR** acetaminophen (TYLENOL) suppository 650 mg, 650 mg, Rectal, Q4H PRN, Eusebio Farnsworth MD  •  atorvastatin (LIPITOR) tablet 40 mg, 40 mg, Oral, Daily, Urban Mason MD, 40 mg at 01/08/18 0958  •  bisacodyl (DULCOLAX) EC tablet 10 mg, 10 mg, Oral, Daily PRN, Nghia Esparza MD  •  dextrose (D50W) solution 25 g, 25 g, Intravenous, Q15 Min PRN, Eusebio Farnsworth MD  •  dextrose (GLUTOSE) oral gel 15 g, 15 g, Oral, Q15 Min PRN, Eusebio Farnsworth MD  •  docusate sodium (COLACE) capsule 100 mg, 100 mg, Oral, BID PRN, Nghia Esparza MD  •  enoxaparin (LOVENOX) syringe 40 mg, 40 mg, Subcutaneous, Q24H, Urban Mason MD, 40 mg at 01/07/18 2130  •  glipiZIDE (GLUCOTROL) tablet 10 mg, 10 mg, Oral, QAM AC, Juan Nicholson MD, 10 mg at 01/08/18 0958  •  glucagon (human recombinant) (GLUCAGEN DIAGNOSTIC) injection 1 mg, 1 mg, Subcutaneous, Q15 Min PRN, Eusebio Farnsworth MD  •  HYDROcodone-acetaminophen (NORCO)  MG per tablet 1 tablet, 1 tablet, Oral, Q4H PRN, Nghia Esparza MD, 1 tablet at 01/05/18 1511  •  Influenza Vac Subunit Quad (FLUCELVAX) injection 0.5 mL, 0.5 mL, Intramuscular, During Hospitalization, Eusebio Farnsworth MD  •  insulin aspart (novoLOG) injection 2-5 Units, 2-5 Units, Subcutaneous, 4x Daily AC & at Bedtime, Ever Gongora MD, 2.5 Units at 01/05/18 0913  •  ipratropium-albuterol (DUO-NEB) nebulizer solution 3 mL, 3 mL, Nebulization, 4x Daily - RT, Eusebio Farnsworth MD, 3 mL at 01/08/18 1215  •  lactated ringers infusion, 75 mL/hr, Intravenous, Continuous, Nghia Esparza MD, Stopped at 01/08/18 0027  •  Magnesium Sulfate 2 gram infusion - Mg less than or equal to 1.5 mg/dL, 2 g, Intravenous, PRN **OR** Magesium Sulfate 1 gram infusion - Mg 1.6-1.9 mg/dL, 1 g, Intravenous, PRN, Eusebio Farnsworth MD  •  metFORMIN (GLUCOPHAGE) tablet 500 mg, 500 mg, Oral, BID With Meals, Juan Nicholson MD, 500 mg at 01/08/18 0958  •   metoprolol tartrate (LOPRESSOR) tablet 50 mg, 50 mg, Oral, Q8H, Jesika Owen, APRN, 50 mg at 01/08/18 0608  •  morphine injection 4 mg, 4 mg, Intravenous, Q4H PRN, Yuri Frank MD, 4 mg at 01/08/18 0840  •  naloxone (NARCAN) injection 0.1 mg, 0.1 mg, Intravenous, Q5 Min PRN, Nghia Esparza MD  •  ondansetron (ZOFRAN) injection 4 mg, 4 mg, Intravenous, Q6H PRN, Eusebio Farnsworth MD  •  ondansetron (ZOFRAN) tablet 4 mg, 4 mg, Oral, Q6H PRN **OR** ondansetron ODT (ZOFRAN-ODT) disintegrating tablet 4 mg, 4 mg, Oral, Q6H PRN **OR** ondansetron (ZOFRAN) injection 4 mg, 4 mg, Intravenous, Q6H PRN, Nghia Esparza MD  •  oxyCODONE-acetaminophen (PERCOCET)  MG per tablet 1 tablet, 1 tablet, Oral, Q4H PRN, Chacho Kam MD, 1 tablet at 01/08/18 1107  •  penicillin G potassium 4 Million Units in sodium chloride 0.9 % 100 mL IVPB, 4 Million Units, Intravenous, Q4H, Bowen Ford MD, 4 Million Units at 01/08/18 0958  •  potassium chloride (MICRO-K) CR capsule 40 mEq, 40 mEq, Oral, PRN, 40 mEq at 12/27/17 2316 **OR** potassium chloride (KLOR-CON) packet 40 mEq, 40 mEq, Oral, PRN **OR** potassium chloride 10 mEq in 100 mL IVPB, 10 mEq, Intravenous, Q1H PRN, Eusebio Farnsworth MD, Last Rate: 100 mL/hr at 12/27/17 0241, 10 mEq at 12/27/17 0241  •  sodium chloride 0.9 % flush 1-10 mL, 1-10 mL, Intravenous, PRN, Eusebio Farnsworth MD  •  Sodium Hypochloride 0.0625 % (Dakins 1/8th Strength), 946 mL, Irrigation, Q12H, Regulo Bowers MD, 946 mL at 01/08/18 0901      Assessment/Plan     ASSESSMENT:    Principal Problem:    Sepsis  Active Problems:    Abnormal MRI, lumbar spine    Diabetes    Abscess in epidural space of lumbar spine    Abscess in epidural space of thoracic spine    Postoperative anemia due to acute blood loss    Type 2 diabetes mellitus without complication      PLAN: He remains stable from a neurosurgical standpoint.  Bilateral LION drains removed this morning. Wounds like fine. Patient  needs encouragement and rehab. We will sign off but remain available. Needs staple removal in our office in 10-14 days. Pain meds per primary service or pain management. Antibiotics per ID.    I discussed the patients findings and my recommendations with patient and nursing staff       LOS: 16 days       Geno Hernandez, APRN  1/8/2018  1:09 PM

## 2018-01-08 NOTE — SIGNIFICANT NOTE
01/08/18 1306   Rehab Treatment   Discipline physical therapy assistant   Treatment Not Performed other (see comments)  (Pt currently transferring from  to [facility]. RN asked for PT to hold off at this time. Will notify [facility] team of pt transfer.)   Recommendation   PT - Next Appointment 01/08/18

## 2018-01-08 NOTE — PLAN OF CARE
Problem: Patient Care Overview (Adult)  Goal: Plan of Care Review  Outcome: Ongoing (interventions implemented as appropriate)   01/08/18 3002   Coping/Psychosocial Response Interventions   Plan Of Care Reviewed With patient   Patient Care Overview   Progress progress toward functional goals is gradual   Outcome Evaluation   Outcome Summary/Follow up Plan pt transfered from . dressing changes bid. morphine to be given before dressing change. oksana drains removed this am. voiding freely. VSS       Problem: Fall Risk (Adult)  Goal: Absence of Falls  Outcome: Ongoing (interventions implemented as appropriate)      Problem: Pressure Ulcer Risk (Antonio Scale) (Adult,Obstetrics,Pediatric)  Goal: Skin Integrity  Outcome: Ongoing (interventions implemented as appropriate)      Problem: Diabetes, Type 2 (Adult)  Goal: Signs and Symptoms of Listed Potential Problems Will be Absent or Manageable (Diabetes, Type 2)  Outcome: Ongoing (interventions implemented as appropriate)      Problem: Skin Integrity Impairment, Risk/Actual (Adult)  Goal: Skin Integrity/Wound Healing  Outcome: Ongoing (interventions implemented as appropriate)

## 2018-01-08 NOTE — PROGRESS NOTES
Continued Stay Note  [facility]     Patient Name: Sam Barrett  MRN: [id number]  Today's Date: 1/8/2018    Admit Date: 12/23/2017          Discharge Plan       01/08/18 1626    Case Management/Social Work Plan    Plan [place]- insurance approved    Additional Comments Per Fidel eastman/ [facility], they have precert and patient can transfer to rehab when medically ready;  Precert is good for 2 days.        01/08/18 1529    Case Management/Social Work Plan    Additional Comments Transfer from 5S to  01/08/18 @ 1358.                     Expected Discharge Date and Time     Expected Discharge Date Expected Discharge Time    Jan 9, 2018             Néstor Dove RN

## 2018-01-09 ENCOUNTER — TELEPHONE (OUTPATIENT)
Dept: NEUROSURGERY | Facility: CLINIC | Age: 58
End: 2018-01-09

## 2018-01-09 LAB
ABO GROUP BLD: NORMAL
ALBUMIN SERPL-MCNC: 2 G/DL (ref 3.5–5.2)
ANION GAP SERPL CALCULATED.3IONS-SCNC: 9.8 MMOL/L
BASOPHILS # BLD AUTO: 0.01 10*3/MM3 (ref 0–0.2)
BASOPHILS NFR BLD AUTO: 0.2 % (ref 0–1.5)
BLD GP AB SCN SERPL QL: NEGATIVE
BUN BLD-MCNC: 18 MG/DL (ref 6–20)
BUN/CREAT SERPL: 15.7 (ref 7–25)
CALCIUM SPEC-SCNC: 9.1 MG/DL (ref 8.6–10.5)
CHLORIDE SERPL-SCNC: 104 MMOL/L (ref 98–107)
CO2 SERPL-SCNC: 25.2 MMOL/L (ref 22–29)
CREAT BLD-MCNC: 1.15 MG/DL (ref 0.76–1.27)
DEPRECATED RDW RBC AUTO: 51.6 FL (ref 37–54)
EOSINOPHIL # BLD AUTO: 0.15 10*3/MM3 (ref 0–0.7)
EOSINOPHIL NFR BLD AUTO: 2.5 % (ref 0.3–6.2)
ERYTHROCYTE [DISTWIDTH] IN BLOOD BY AUTOMATED COUNT: 14.8 % (ref 11.5–14.5)
GFR SERPL CREATININE-BSD FRML MDRD: 66 ML/MIN/1.73
GLUCOSE BLD-MCNC: 116 MG/DL (ref 65–99)
GLUCOSE BLDC GLUCOMTR-MCNC: 118 MG/DL (ref 70–130)
GLUCOSE BLDC GLUCOMTR-MCNC: 121 MG/DL (ref 70–130)
GLUCOSE BLDC GLUCOMTR-MCNC: 130 MG/DL (ref 70–130)
GLUCOSE BLDC GLUCOMTR-MCNC: 149 MG/DL (ref 70–130)
HCT VFR BLD AUTO: 25.1 % (ref 40.4–52.2)
HGB BLD-MCNC: 7.6 G/DL (ref 13.7–17.6)
IMM GRANULOCYTES # BLD: 0.05 10*3/MM3 (ref 0–0.03)
IMM GRANULOCYTES NFR BLD: 0.8 % (ref 0–0.5)
LYMPHOCYTES # BLD AUTO: 1.5 10*3/MM3 (ref 0.9–4.8)
LYMPHOCYTES NFR BLD AUTO: 24.9 % (ref 19.6–45.3)
MCH RBC QN AUTO: 29.3 PG (ref 27–32.7)
MCHC RBC AUTO-ENTMCNC: 30.3 G/DL (ref 32.6–36.4)
MCV RBC AUTO: 96.9 FL (ref 79.8–96.2)
MONOCYTES # BLD AUTO: 0.56 10*3/MM3 (ref 0.2–1.2)
MONOCYTES NFR BLD AUTO: 9.3 % (ref 5–12)
NEUTROPHILS # BLD AUTO: 3.75 10*3/MM3 (ref 1.9–8.1)
NEUTROPHILS NFR BLD AUTO: 62.3 % (ref 42.7–76)
PHOSPHATE SERPL-MCNC: 3 MG/DL (ref 2.5–4.5)
PLATELET # BLD AUTO: 255 10*3/MM3 (ref 140–500)
PMV BLD AUTO: 9.7 FL (ref 6–12)
POTASSIUM BLD-SCNC: 3.3 MMOL/L (ref 3.5–5.2)
RBC # BLD AUTO: 2.59 10*6/MM3 (ref 4.6–6)
RH BLD: POSITIVE
SODIUM BLD-SCNC: 139 MMOL/L (ref 136–145)
WBC NRBC COR # BLD: 6.02 10*3/MM3 (ref 4.5–10.7)

## 2018-01-09 PROCEDURE — 86923 COMPATIBILITY TEST ELECTRIC: CPT

## 2018-01-09 PROCEDURE — 86901 BLOOD TYPING SEROLOGIC RH(D): CPT | Performed by: INTERNAL MEDICINE

## 2018-01-09 PROCEDURE — 86850 RBC ANTIBODY SCREEN: CPT | Performed by: INTERNAL MEDICINE

## 2018-01-09 PROCEDURE — 94799 UNLISTED PULMONARY SVC/PX: CPT

## 2018-01-09 PROCEDURE — 86900 BLOOD TYPING SEROLOGIC ABO: CPT | Performed by: INTERNAL MEDICINE

## 2018-01-09 PROCEDURE — 82962 GLUCOSE BLOOD TEST: CPT

## 2018-01-09 PROCEDURE — 97110 THERAPEUTIC EXERCISES: CPT

## 2018-01-09 PROCEDURE — P9016 RBC LEUKOCYTES REDUCED: HCPCS

## 2018-01-09 PROCEDURE — 36430 TRANSFUSION BLD/BLD COMPNT: CPT

## 2018-01-09 PROCEDURE — 86900 BLOOD TYPING SEROLOGIC ABO: CPT

## 2018-01-09 PROCEDURE — 25010000002 MORPHINE PER 10 MG: Performed by: INTERNAL MEDICINE

## 2018-01-09 PROCEDURE — 80069 RENAL FUNCTION PANEL: CPT | Performed by: INTERNAL MEDICINE

## 2018-01-09 PROCEDURE — 25010000002 ENOXAPARIN PER 10 MG: Performed by: INTERNAL MEDICINE

## 2018-01-09 PROCEDURE — 25010000002 DIPHENHYDRAMINE PER 50 MG: Performed by: INTERNAL MEDICINE

## 2018-01-09 PROCEDURE — 85025 COMPLETE CBC W/AUTO DIFF WBC: CPT | Performed by: INTERNAL MEDICINE

## 2018-01-09 PROCEDURE — 25010000003 PENICILLIN G POTASSIUM PER 600000 UNITS: Performed by: INTERNAL MEDICINE

## 2018-01-09 RX ORDER — ACETAMINOPHEN 325 MG/1
650 TABLET ORAL ONCE
Status: COMPLETED | OUTPATIENT
Start: 2018-01-09 | End: 2018-01-09

## 2018-01-09 RX ORDER — BISACODYL 10 MG
10 SUPPOSITORY, RECTAL RECTAL DAILY
Status: DISCONTINUED | OUTPATIENT
Start: 2018-01-09 | End: 2018-01-11 | Stop reason: HOSPADM

## 2018-01-09 RX ORDER — HYDROMORPHONE HYDROCHLORIDE 1 MG/ML
0.5 INJECTION, SOLUTION INTRAMUSCULAR; INTRAVENOUS; SUBCUTANEOUS ONCE
Status: COMPLETED | OUTPATIENT
Start: 2018-01-09 | End: 2018-01-09

## 2018-01-09 RX ORDER — DIPHENHYDRAMINE HYDROCHLORIDE 50 MG/ML
25 INJECTION INTRAMUSCULAR; INTRAVENOUS ONCE
Status: COMPLETED | OUTPATIENT
Start: 2018-01-09 | End: 2018-01-09

## 2018-01-09 RX ORDER — TAMSULOSIN HYDROCHLORIDE 0.4 MG/1
0.4 CAPSULE ORAL DAILY
Status: DISCONTINUED | OUTPATIENT
Start: 2018-01-09 | End: 2018-01-11 | Stop reason: HOSPADM

## 2018-01-09 RX ADMIN — IPRATROPIUM BROMIDE AND ALBUTEROL SULFATE 3 ML: .5; 3 SOLUTION RESPIRATORY (INHALATION) at 09:49

## 2018-01-09 RX ADMIN — METOPROLOL TARTRATE 50 MG: 50 TABLET, FILM COATED ORAL at 18:55

## 2018-01-09 RX ADMIN — ACETAMINOPHEN 650 MG: 325 TABLET ORAL at 18:54

## 2018-01-09 RX ADMIN — IPRATROPIUM BROMIDE AND ALBUTEROL SULFATE 3 ML: .5; 3 SOLUTION RESPIRATORY (INHALATION) at 12:33

## 2018-01-09 RX ADMIN — IPRATROPIUM BROMIDE AND ALBUTEROL SULFATE 3 ML: .5; 3 SOLUTION RESPIRATORY (INHALATION) at 16:58

## 2018-01-09 RX ADMIN — ENOXAPARIN SODIUM 40 MG: 40 INJECTION SUBCUTANEOUS at 21:19

## 2018-01-09 RX ADMIN — OXYCODONE HYDROCHLORIDE AND ACETAMINOPHEN 1 TABLET: 10; 325 TABLET ORAL at 09:43

## 2018-01-09 RX ADMIN — SODIUM CHLORIDE 4 MILLION UNITS: 0.9 INJECTION INTRAVENOUS at 18:55

## 2018-01-09 RX ADMIN — MORPHINE SULFATE 4 MG: 4 INJECTION INTRAVENOUS at 00:23

## 2018-01-09 RX ADMIN — SODIUM CHLORIDE 4 MILLION UNITS: 0.9 INJECTION INTRAVENOUS at 14:45

## 2018-01-09 RX ADMIN — METFORMIN HYDROCHLORIDE 500 MG: 500 TABLET ORAL at 08:36

## 2018-01-09 RX ADMIN — HYDROMORPHONE HYDROCHLORIDE 0.5 MG: 10 INJECTION INTRAMUSCULAR; INTRAVENOUS; SUBCUTANEOUS at 11:22

## 2018-01-09 RX ADMIN — DIPHENHYDRAMINE HYDROCHLORIDE 25 MG: 50 INJECTION, SOLUTION INTRAMUSCULAR; INTRAVENOUS at 18:54

## 2018-01-09 RX ADMIN — IPRATROPIUM BROMIDE AND ALBUTEROL SULFATE 3 ML: .5; 3 SOLUTION RESPIRATORY (INHALATION) at 00:00

## 2018-01-09 RX ADMIN — DAKIN'S SOLUTION 0.125% (QUARTER STRENGTH) 946 ML: 0.12 SOLUTION at 11:25

## 2018-01-09 RX ADMIN — OXYCODONE HYDROCHLORIDE AND ACETAMINOPHEN 1 TABLET: 10; 325 TABLET ORAL at 14:38

## 2018-01-09 RX ADMIN — OXYCODONE HYDROCHLORIDE AND ACETAMINOPHEN 1 TABLET: 10; 325 TABLET ORAL at 05:02

## 2018-01-09 RX ADMIN — DOCUSATE SODIUM 100 MG: 100 CAPSULE, LIQUID FILLED ORAL at 08:44

## 2018-01-09 RX ADMIN — SODIUM CHLORIDE 4 MILLION UNITS: 0.9 INJECTION INTRAVENOUS at 10:46

## 2018-01-09 RX ADMIN — HYDROMORPHONE HYDROCHLORIDE 0.5 MG: 10 INJECTION INTRAMUSCULAR; INTRAVENOUS; SUBCUTANEOUS at 22:55

## 2018-01-09 RX ADMIN — METFORMIN HYDROCHLORIDE 500 MG: 500 TABLET ORAL at 18:55

## 2018-01-09 RX ADMIN — DAKIN'S SOLUTION 0.125% (QUARTER STRENGTH) 946 ML: 0.12 SOLUTION at 01:22

## 2018-01-09 RX ADMIN — TAMSULOSIN HYDROCHLORIDE 0.4 MG: 0.4 CAPSULE ORAL at 12:52

## 2018-01-09 RX ADMIN — SODIUM CHLORIDE 4 MILLION UNITS: 0.9 INJECTION INTRAVENOUS at 04:26

## 2018-01-09 RX ADMIN — SODIUM CHLORIDE 4 MILLION UNITS: 0.9 INJECTION INTRAVENOUS at 00:24

## 2018-01-09 RX ADMIN — HYDROCODONE BITARTRATE AND ACETAMINOPHEN 1 TABLET: 10; 325 TABLET ORAL at 21:20

## 2018-01-09 RX ADMIN — METOPROLOL TARTRATE 50 MG: 50 TABLET, FILM COATED ORAL at 08:36

## 2018-01-09 RX ADMIN — BISACODYL 10 MG: 10 SUPPOSITORY RECTAL at 14:38

## 2018-01-09 RX ADMIN — POTASSIUM CHLORIDE 40 MEQ: 750 CAPSULE, EXTENDED RELEASE ORAL at 08:44

## 2018-01-09 RX ADMIN — METOPROLOL TARTRATE 50 MG: 50 TABLET, FILM COATED ORAL at 01:20

## 2018-01-09 RX ADMIN — BISACODYL 10 MG: 5 TABLET, COATED ORAL at 10:47

## 2018-01-09 RX ADMIN — SODIUM CHLORIDE 4 MILLION UNITS: 0.9 INJECTION INTRAVENOUS at 22:26

## 2018-01-09 RX ADMIN — DAKIN'S SOLUTION 0.125% (QUARTER STRENGTH) 946 ML: 0.12 SOLUTION at 22:55

## 2018-01-09 RX ADMIN — GLIPIZIDE 10 MG: 10 TABLET ORAL at 08:36

## 2018-01-09 RX ADMIN — POTASSIUM CHLORIDE 40 MEQ: 750 CAPSULE, EXTENDED RELEASE ORAL at 14:38

## 2018-01-09 RX ADMIN — ATORVASTATIN CALCIUM 40 MG: 20 TABLET, FILM COATED ORAL at 08:36

## 2018-01-09 NOTE — TELEPHONE ENCOUNTER
----- Message from CHATA Armijo sent at 1/8/2018  4:49 PM EST -----  Regarding: hfu  He has appt on 1/22. Cancel and arrange appt in 7-10 days for staple removal. No imaging needed. He is going to rehab somewhere. If he had an MRI scheduled for 1/22 appt, it can be cancelled

## 2018-01-09 NOTE — PROGRESS NOTES
"                      Ethan Frank MD                          [phone]      Patient ID:    Name:  Sam Barrett    MRN:  [id number]    1960   57 y.o.  male            Patient Care Team:  Casey Verduzco Jr., MD as PCP - General (Family Medicine)    LOS: 17  INTERVAL  Follow up sepsis, group B strep septicemia, epidural abscess, infection of cephalic vein, IVDU, New onset DM, HCV  Status post T9 through S1 posterior lumbar decompression of epidural abscess.  S/p right abscess debridement    Dressing changes - \"it hurts like a bitch\"  No soa  No new motor deficits  Some constipation    Objective     Vital Signs past 24hrs    BP range: BP: (115-136)/(71-90) 124/78  Pulse range: Heart Rate:  [79-97] 81  Resp rate range: Resp:  [16-22] 18  Temp range: Temp (24hrs), Av.3 °F (36.8 °C), Min:98.1 °F (36.7 °C), Max:98.4 °F (36.9 °C)    O2 Device: room air       132 kg (291 lb 1.6 oz); Body mass index is 34.51 kg/(m^2).    Intake/Output Summary (Last 24 hours) at 18 1336  Last data filed at 18 1119   Gross per 24 hour   Intake                0 ml   Output             1875 ml   Net            -1875 ml     Exam:  GENERAL:  NAD, Aox3No distress currently.   HEENT:  EOMI, nonicteric sclera, no JVD  PULMONARY:    No resp distress CTAB, no wheeze, no crackles, no rhonchi, symmetric air entry  CARDIAC:  RRR no MRG, S1 S2  ABD: SNTND BS+  EXT: RUE with bandage minimal edema strength 5/5 symmetric today, pulses symmetric 2+   NEURO:  CNs II-XII intact, no focal deficits  SKIN: as above  PSYCH: appropriate mood    Scheduled meds:      atorvastatin 40 mg Oral Daily   enoxaparin 40 mg Subcutaneous Q24H   glipiZIDE 10 mg Oral QAM AC   insulin aspart 2-5 Units Subcutaneous 4x Daily AC & at Bedtime   ipratropium-albuterol 3 mL Nebulization 4x Daily - RT   metFORMIN 500 mg Oral BID With Meals   metoprolol tartrate 50 mg Oral Q8H   penicillin g (potassium) 4 Million Units Intravenous Q4H   sodium hypochlorite " 946 mL Irrigation Q12H   tamsulosin 0.4 mg Oral Daily                     lactated ringers 75 mL/hr Last Rate: Stopped (01/08/18 0027)       Data Review:      Results from last 7 days  Lab Units 01/09/18  0434 01/04/18  1246 01/04/18  0527  01/03/18  0608   SODIUM mmol/L 139  --  133*  --  134*   POTASSIUM mmol/L 3.3*  --  5.0  --  4.0   CHLORIDE mmol/L 104  --  97*  --  98   CO2 mmol/L 25.2  --  24.0  --  26.6   BUN mg/dL 18  --  24*  --  14   CREATININE mg/dL 1.15  --  1.18  --  0.58*   CALCIUM mg/dL 9.1  --  9.0  --  8.8   BILIRUBIN mg/dL  --   --   --   --  1.0   ALK PHOS U/L  --   --   --   --  91   ALT (SGPT) U/L  --   --   --   --  42*   AST (SGOT) U/L  --   --   --   --  37   GLUCOSE mg/dL 116*  --  147*  --  151*   WBC 10*3/mm3 6.02 9.33 10.31  --  9.35   HEMOGLOBIN g/dL 7.6* 9.4* 9.3*  < > 11.5*   PLATELETS 10*3/mm3 255 316 367  --  343   < > = values in this interval not displayed.    Lab Results   Component Value Date    CALCIUM 9.1 01/09/2018    PHOS 3.0 01/09/2018       Results from last 7 days  Lab Units 01/07/18  0808 01/03/18  1656 01/03/18  1642 01/03/18  1639   WOUNDCX  No growth at 2 days  --   --    Rare Streptococcus agalactiae (Group B)*   GRAM STAIN RESULT  Moderate (3+) Red blood cells  Rare (1+) WBCs seen  No organisms seen Rare (1+) WBCs seen  No organisms seen Moderate (3+) Red blood cells  Rare (1+) WBCs seen  No organisms seen Moderate (3+) Red blood cells  Rare (1+) WBCs seen  Occasional Gram positive cocci         Results Review:    I have reviewed the available laboratory results and reviewed the chest imaging personally    ASSESSMENT:   Acute hypoxic respiratory failure resolved   Strep septicemia/bacteremia   Right Cephalic vein Pylephlebitis  History of IV drug abuse   Cellulitis lower extremity   New onset diabetes mellitus  Chronic narcotic dependence/ abuse  Intermittent SVT   Hepatitis C  Epidural abscess    PLAN:  Will order picc  Obtain directions of care for CAROL  from vascular and discharge when above and NH location available.  Would not go home with picc.  Only to nf.  Hg drifting post operatively.  Will give 1 unit  Discharge tomorrow    DVT ppx   Full Code     Ethan Frank MD  1/9/2018

## 2018-01-09 NOTE — PLAN OF CARE
Problem: Patient Care Overview (Adult)  Goal: Plan of Care Review  Outcome: Ongoing (interventions implemented as appropriate)   01/09/18 0704   Coping/Psychosocial Response Interventions   Plan Of Care Reviewed With patient   Patient Care Overview   Progress improving   Outcome Evaluation   Outcome Summary/Follow up Plan Pt A/O x4 this shift. Back pain controlled well with pain meds, however not during dressing change to rt arm. Pt in severe pain during procedure. Unable to amb with assist. Pt states legs are too weak. Using urinal and voiding without diff. Multiple scabs/wounds noted tonya feet. Consult placed to wound care nurse to evaluate.        Problem: Fall Risk (Adult)  Goal: Absence of Falls  Outcome: Ongoing (interventions implemented as appropriate)      Problem: Pressure Ulcer Risk (Antonio Scale) (Adult,Obstetrics,Pediatric)  Goal: Skin Integrity  Outcome: Ongoing (interventions implemented as appropriate)      Problem: Diabetes, Type 2 (Adult)  Goal: Signs and Symptoms of Listed Potential Problems Will be Absent or Manageable (Diabetes, Type 2)  Outcome: Ongoing (interventions implemented as appropriate)      Problem: Pain, Acute (Adult)  Goal: Acceptable Pain Control/Comfort Level  Outcome: Ongoing (interventions implemented as appropriate)      Problem: Skin Integrity Impairment, Risk/Actual (Adult)  Goal: Skin Integrity/Wound Healing  Outcome: Ongoing (interventions implemented as appropriate)

## 2018-01-09 NOTE — THERAPY TREATMENT NOTE
Acute Care - Physical Therapy Treatment Note  [facility]     Patient Name: Sam Barrett  : 1960  MRN: [id number]  Today's Date: 2018  Onset of Illness/Injury or Date of Surgery Date: 17          Admit Date: 2017    Visit Dx:    ICD-10-CM ICD-9-CM   1. Abnormal MRI, lumbar spine R93.7 793.7   2. 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   • Abscess in epidural space of thoracic spine   • Postoperative anemia due to acute blood loss   • Type 2 diabetes mellitus without complication               Adult Rehabilitation Note       18 1500 18 1600       Rehab Assessment/Intervention    Discipline physical therapy assistant  -CW physical therapist  -ZK     Document Type therapy note (daily note)  -CW therapy note (daily note)  -ZK     Subjective Information agree to therapy;complains of;pain  -CW complains of;weakness;pain  -ZK     Patient Effort, Rehab Treatment adequate  -CW poor  -ZK     Symptoms Noted During/After Treatment  fatigue;increased pain  -ZK     Symptoms Noted Comment  focusing on pain excessively  -ZK     Precautions/Limitations fall precautions;spinal precautions  -CW fall precautions;spinal precautions  -ZK     Specific Treatment Considerations  significant edema right hand noted  -ZK     Equipment Issued to Patient  tub bench  -ZK     Recorded by [CW] Rizwan Cosme PTA [ZK] Zorre Zeno Kimura, PT     Vital Signs    Pre SpO2 (%)  96  -ZK     O2 Delivery Pre Treatment room air  -CW supplemental O2  -ZK     Recorded by [CW] Rizwan Cosme PTA [ZK] Zorre Zeno Kimura, PT     Pain Assessment    Pain Assessment 0-10  -CW 0-10  -ZK     Pain Score 8  -CW 8  -ZK     Post Pain Score 8  -CW      Pain Type Acute pain;Surgical pain  -CW Acute pain;Surgical pain  -ZK     Pain Location Back  -CW Back  -ZK     Pain Intervention(s)  Repositioned;Ambulation/increased activity  -CW Medication (See MAR)  -ZK     Response to Interventions see  -CW      Recorded by [CW] Rizwan Cosme PTA [ZK] Zorre Zeno Kimura, PT     Cognitive Assessment/Intervention    Current Cognitive/Communication Assessment functional  -CW functional  -ZK     Orientation Status oriented x 4  -CW oriented x 4  -ZK     Follows Commands/Answers Questions 100% of the time  -CW      Personal Safety WNL/WFL  -CW      Personal Safety Interventions fall prevention program maintained;gait belt;muscle strengthening facilitated;nonskid shoes/slippers when out of bed  -CW      Recorded by [CW] Rizwan Cosme PTA [ZK] Zorre Zeno Kimura, PT     Bed Mobility, Assessment/Treatment    Bed Mobility, Roll Right, [place]  moderate assist (50% patient effort);2 person assist required  -ZK     Bed Mob, Supine to Sit, [place] moderate assist (50% patient effort)  -CW      Bed Mob, Sit to Supine, [place] moderate assist (50% patient effort)  -CW      Bed Mob, Sidelying to Sit, [place]  moderate assist (50% patient effort);2 person assist required  -ZK     Recorded by [CW] Rizwan Cosme PTA [ZK] Zorre Zeno Kimura, PT     Transfer Assessment/Treatment    Transfers, Sit-Stand [place] not tested  -CW moderate assist (50% patient effort);2 person assist required  -ZK     Transfers, Sit-Stand-Sit, Assist Device  rolling walker;elevated surface  -ZK     Transfer, Comment pt did not want to stand due to sores on his feet and increased pain  -CW stood for 30 seconds while RN straightened out draw sheet  -ZK     Recorded by [CW] Rizwan Cosme PTA [ZK] Zorre Zeno Kimura, PT     Therapy Exercises    Bilateral Lower Extremities AROM:;10 reps;sitting;ankle pumps/circles;glut sets;hip flexion;LAQ  -CW      Recorded by [CW] Rizwan Cosme PTA      Positioning and Restraints    Pre-Treatment Position in bed  -CW in bed  -ZK     Post Treatment Position bed  -CW bed   -ZK     In Bed notified nsg;supine;call light within reach;encouraged to call for assist  -CW supine;call light within reach;exit alarm on;with nsg;SCD pump applied  -ZK     Recorded by [CW] Rizwan Cosme, PTA [ZK] Zorre Zeno Kimura, PT       User Key  (r) = Recorded By, (t) = Taken By, (c) = Cosigned By    Initials Name Effective Dates    JEETK Zorre Zeno Kimura, PT 03/23/16 -     CW Rizwan Cosme, PTA 12/13/16 -                 IP PT Goals       01/04/18 1700          Bed Mobility PT LTG    Bed Mobility PT LTG, Time to Achieve 1 wk  -CH      Bed Mobility PT LTG, Activity Type all bed mobility  -CH      Bed Mobility PT LTG, [place] minimum assist (75% patient effort)  -CH      Transfer Training PT LTG    Transfer Training PT LTG, Time to Achieve 1 wk  -CH      Transfer Training PT LTG, Activity Type all transfers  -CH      Transfer Training PT LTG, [place] minimum assist (75% patient effort)  -CH      Transfer Training PT LTG, Assist Device walker, rolling  -CH      Gait Training PT LTG    Gait Training Goal PT LTG, Time to Achieve 1 wk  -CH      Gait Training Goal PT LTG, [place] minimum assist (75% patient effort)  -CH      Gait Training Goal PT LTG, Assist Device walker, rolling  -CH      Gait Training Goal PT LTG, Distance to Achieve 30  -CH        User Key  (r) = Recorded By, (t) = Taken By, (c) = Cosigned By    Initials Name Provider Type    DAVY Ko, PT Physical Therapist          Physical Therapy Education     Title: PT OT SLP Therapies (Done)     Topic: Physical Therapy (Done)     Point: Mobility training (Done)    Learning Progress Summary    Learner Readiness Method Response Comment Documented by Status   Patient Acceptance HERNANDEZ ROGERS DU  CW 01/09/18 1509 Done    Acceptance EHERNANDEZ D VU,NR  RW 01/05/18 1525 Done    Acceptance HERNANDEZ ROGERS D VU,NR  CH 01/04/18 1700 Done    Acceptance EDAIN,RAFAT  EJ 01/02/18 1549 Done    Acceptance DAIN OBRIEN,NR  KH 12/31/17 1017 Done     Acceptance E VU  MA 12/29/17 1034 Done    Acceptance E,D NR,VU   12/28/17 1330 Done    Acceptance E,TB,D VU,NR   12/27/17 1617 Done    Acceptance E,TB,D VU,NR   12/26/17 1303 Done               Point: Home exercise program (Done)    Learning Progress Summary    Learner Readiness Method Response Comment Documented by Status   Patient Acceptance E,TB VU,DU   01/09/18 1509 Done    Acceptance E,TB,D VU,NR   01/04/18 1700 Done    Acceptance D VU,NR   12/31/17 1017 Done    Acceptance E,TB,D VU,NR   12/26/17 1303 Done               Point: Body mechanics (Done)    Learning Progress Summary    Learner Readiness Method Response Comment Documented by Status   Patient Acceptance E,TB VU,DU   01/09/18 1509 Done    Acceptance E,TB,D VU,NR   01/05/18 1525 Done    Acceptance E,TB,D VU,[facility] 01/04/18 1700 Done    Acceptance E,D VU,NR   01/02/18 1549 Done    Acceptance D VU,NR   12/31/17 1017 Done    Acceptance E VU  MA 12/29/17 1034 Done    Acceptance E,D NR,VU   12/28/17 1330 Done    Acceptance E,TB,D VU,[facility] 12/27/17 1617 Done    Acceptance E,TB,D VU,NR   12/26/17 1303 Done               Point: Precautions (Done)    Learning Progress Summary    Learner Readiness Method Response Comment Documented by Status   Patient Acceptance E,TB VU,DU   01/09/18 1509 Done    Acceptance E,TB,D VU,NR   01/05/18 1525 Done    Acceptance E,TB,D VU,NR   01/04/18 1700 Done    Acceptance D VU,NR   12/31/17 1017 Done    Acceptance E VU  MA 12/29/17 1034 Done    Acceptance E,TB,D VU,[facility] 12/27/17 1617 Done    Acceptance E,TB,D VU,NR   12/26/17 1303 Done                      User Key     Initials Effective Dates Name Provider Type Discipline     05/18/15 -  Marianela Pedro, PT Physical Therapist PT     12/01/15 -  Sheridan Ko, PT Physical Therapist PT     04/21/17 -  Dana Farias, PT Physical Therapist PT    RW 04/06/16 -  Gianna Moore, PTA Physical Therapy Assistant PT    MA 12/13/16 -   Mariann Vang, PT Physical Therapist PT    CW 12/13/16 -  Rizwan Cosme PTA Physical Therapy Assistant PT                    PT Recommendation and Plan  Anticipated Discharge Disposition: skilled nursing facility  Planned Therapy Interventions: balance training, bed mobility training, gait training, home exercise program, patient/family education, transfer training  PT Frequency: daily  Plan of Care Review  Plan Of Care Reviewed With: patient  Progress: progress toward functional goals as expected  Outcome Summary/Follow up Plan: Pt with increase dpain limiting activity but able to increase strength and activity tolerance with ther ex          Outcome Measures       01/09/18 1500          How much help from another person do you currently need...    Turning from your back to your side while in flat bed without using bedrails? 2  -CW      Moving from lying on back to sitting on the side of a flat bed without bedrails? 2  -CW      Moving to and from a bed to a chair (including a wheelchair)? 1  -CW      Standing up from a chair using your arms (e.g., wheelchair, bedside chair)? 1  -CW      Climbing 3-5 steps with a railing? 1  -CW      To walk in hospital room? 1  -CW      AM-PAC 6 Clicks Score 8  -CW      Functional Assessment    Outcome Measure Options AM-PAC 6 Clicks Basic Mobility (PT)  -CW        User Key  (r) = Recorded By, (t) = Taken By, (c) = Cosigned By    Initials Name Provider Type    JF Cosme PTA Physical Therapy Assistant           Time Calculation:         PT Charges       01/09/18 1511          Time Calculation    Start Time 1448  -CW      Stop Time 1511  -CW      Time Calculation (min) 23 min  -CW      PT Received On 01/09/18  -CW      PT - Next Appointment 01/10/18  -CW        User Key  (r) = Recorded By, (t) = Taken By, (c) = Cosigned By    Initials Name Provider Type    JF Cosme PTA Physical Therapy Assistant          Therapy Charges for Today     Code  Description Service Date Service Provider Modifiers Qty    11228104680 HC PT THER PROC EA 15 MIN 1/9/2018 Rizwan Cosme, AFSHAN GP 2    28132965779 HC PT THER SUPP EA 15 MIN 1/9/2018 Rizwan Cosme PTA GP 2          PT G-Codes  Outcome Measure Options: AM-PAC 6 Clicks Basic Mobility (PT)    Rizwan Cosme PTA  1/9/2018

## 2018-01-09 NOTE — NURSING NOTE
"   01/09/18 1100   Wound 01/09/18 1112 Left other (see comments) foot blister(s)   Date first assessed/Time first assessed: 01/09/18 1112   Side: Left  Orientation: (c) other (see comments)  Location: foot  Type: blister(s)   Wound WDL ex   Dressing Appearance intact;dry   Base blistered   Periwound Area dry;intact;other (see comments)  (flaky, calloused)   Length (cm) 1   Width (cm) 3.5   Depth (cm) 0   Drainage Amount none   Dressing low-adherent;other (see comments)  (optifoam)     Consult received for left foot wound.  Patient with blood/fluid filled blister to plantar surface of left foot. Not quite sure of the origin of this but patient states he was using a tool to scrape the dry skin and calluses from feet and ended up breaking/scratching the skin in several places.  There are intact abrasions present to bottom of both feet/heels.  Patient is newly diagnosed diabetic and educated on him inspecting feet daily but to avoid using any aggressive \"tools\" to manage the calluses/dry skin.  Patient verbalized understanding but needs reinforcement.  Recommend keeping area clean and dry and cover with Optifoam dressing for protection.  Discussed with DELIO Tanner.  Thank you for consult and please don't hesitate to call with any questions or re consult prn.   "

## 2018-01-09 NOTE — PLAN OF CARE
Problem: Patient Care Overview (Adult)  Goal: Plan of Care Review  Outcome: Ongoing (interventions implemented as appropriate)   01/09/18 1510   Coping/Psychosocial Response Interventions   Plan Of Care Reviewed With patient   Patient Care Overview   Progress progress toward functional goals as expected   Outcome Evaluation   Outcome Summary/Follow up Plan Pt with increase dpain limiting activity but able to increase strength and activity tolerance with ther ex

## 2018-01-09 NOTE — PROGRESS NOTES
Continued Stay Note  [facility]     Patient Name: Sam Barrett  MRN: [id number]  Today's Date: 1/9/2018    Admit Date: 12/23/2017          Discharge Plan       01/09/18 0945    Case Management/Social Work Plan    Additional Comments Chart reviewed and d/w nurse.  S/p I&D of right arm.  Patient will need a PICC if continued antibiotics is the plan after d/c.  No BM recently.  Nurse to follow-up with ID/ Vascular today.                       Expected Discharge Date and Time     Expected Discharge Date Expected Discharge Time    Nathan 10, 2018             Néstor Dove RN

## 2018-01-09 NOTE — PROGRESS NOTES
Ethan Frank MD                          [phone]      Patient ID:    Name:  Sam Barrett    MRN:  [id number]    1960   57 y.o.  male            Patient Care Team:  Casey Verduzco Jr., MD as PCP - General (Family Medicine)    LOS: 16  INTERVAL  Follow up sepsis, group B strep septicemia, epidural abscess, infection of cephalic vein, IVDU, New onset DM, HCV  Status post T9 through S1 posterior lumbar decompression of epidural abscess.  S/p right abscess debridement  Some pain with dressign changes  No soa  No new motor deficits      Objective     Vital Signs past 24hrs    BP range: BP: (122-132)/(69-90) 132/90  Pulse range: Heart Rate:  [73-97] 96  Resp rate range: Resp:  [16-22] 18  Temp range: Temp (24hrs), Av.5 °F (36.9 °C), Min:98.1 °F (36.7 °C), Max:98.9 °F (37.2 °C)    O2 Device: room air       132 kg (291 lb 1.6 oz); Body mass index is 34.51 kg/(m^2).    Intake/Output Summary (Last 24 hours) at 18 1927  Last data filed at 18 1633   Gross per 24 hour   Intake              320 ml   Output             2250 ml   Net            -1930 ml     Exam:  GENERAL:  NAD, Aox3No distress currently.   HEENT:  EOMI, nonicteric sclera, no JVD  PULMONARY:    No resp distress CTAB, no wheeze, no crackles, no rhonchi, symmetric air entry  CARDIAC:  RRR no MRG, S1 S2  ABD: SNTND BS+  EXT: RUE with bandage minimal edema strength 5/5 symmetric today, pulses symmetric 2+   NEURO:  CNs II-XII intact, no focal deficits  SKIN: as above  PSYCH: appropriate mood    Scheduled meds:      atorvastatin 40 mg Oral Daily   enoxaparin 40 mg Subcutaneous Q24H   glipiZIDE 10 mg Oral QAM AC   insulin aspart 2-5 Units Subcutaneous 4x Daily AC & at Bedtime   ipratropium-albuterol 3 mL Nebulization 4x Daily - RT   metFORMIN 500 mg Oral BID With Meals   metoprolol tartrate 50 mg Oral Q8H   penicillin g (potassium) 4 Million Units Intravenous Q4H   sodium hypochlorite 946 mL Irrigation Q12H                      lactated ringers 75 mL/hr Last Rate: Stopped (01/08/18 0027)       Data Review:      Results from last 7 days  Lab Units 01/04/18  1246 01/04/18  0527 01/03/18 2013 01/03/18  0608   SODIUM mmol/L  --  133*  --  134*   POTASSIUM mmol/L  --  5.0  --  4.0   CHLORIDE mmol/L  --  97*  --  98   CO2 mmol/L  --  24.0  --  26.6   BUN mg/dL  --  24*  --  14   CREATININE mg/dL  --  1.18  --  0.58*   CALCIUM mg/dL  --  9.0  --  8.8   BILIRUBIN mg/dL  --   --   --  1.0   ALK PHOS U/L  --   --   --  91   ALT (SGPT) U/L  --   --   --  42*   AST (SGOT) U/L  --   --   --  37   GLUCOSE mg/dL  --  147*  --  151*   WBC 10*3/mm3 9.33 10.31  --  9.35   HEMOGLOBIN g/dL 9.4* 9.3* 9.4* 11.5*   PLATELETS 10*3/mm3 316 367  --  343       Lab Results   Component Value Date    CALCIUM 9.0 01/04/2018       Results from last 7 days  Lab Units 01/07/18  0808 01/03/18  1656 01/03/18  1642 01/03/18  1639   WOUNDCX  No growth at 24 hours  --   --    Rare Streptococcus agalactiae (Group B)*   GRAM STAIN RESULT  Moderate (3+) Red blood cells  Rare (1+) WBCs seen  No organisms seen Rare (1+) WBCs seen  No organisms seen Moderate (3+) Red blood cells  Rare (1+) WBCs seen  No organisms seen Moderate (3+) Red blood cells  Rare (1+) WBCs seen  Occasional Gram positive cocci         Results Review:    I have reviewed the available laboratory results and reviewed the chest imaging personally    ASSESSMENT:   Acute hypoxic respiratory failure resolved   Strep septicemia/bacteremia   Right Cephalic vein Pylephlebitis  History of IV drug abuse   Cellulitis lower extremity   New onset diabetes mellitus  Chronic narcotic dependence/ abuse  Intermittent SVT   Hepatitis C  Epidural abscess    PLAN:  Discharge when okay with vascular surgery.  .    DVT ppx   Full Code     Ethan Frank MD  1/8/2018

## 2018-01-09 NOTE — TELEPHONE ENCOUNTER
No imaging for patient, ODL.  Patient r/s from 1/22 to 1/18 with KK at 3:15.  Spoke with Nan at [facility], informed of the change. Patient is to arrive at 2:45. Will put this information in dc paperwork.

## 2018-01-10 LAB
ABO + RH BLD: NORMAL
ALBUMIN SERPL-MCNC: 1.8 G/DL (ref 3.5–5.2)
ANION GAP SERPL CALCULATED.3IONS-SCNC: 8.3 MMOL/L
BACTERIA SPEC AEROBE CULT: NORMAL
BASOPHILS # BLD AUTO: 0.01 10*3/MM3 (ref 0–0.2)
BASOPHILS NFR BLD AUTO: 0.2 % (ref 0–1.5)
BH BB BLOOD EXPIRATION DATE: NORMAL
BH BB BLOOD TYPE BARCODE: 6200
BH BB DISPENSE STATUS: NORMAL
BH BB PRODUCT CODE: NORMAL
BH BB UNIT NUMBER: NORMAL
BUN BLD-MCNC: 17 MG/DL (ref 6–20)
BUN/CREAT SERPL: 15.3 (ref 7–25)
CALCIUM SPEC-SCNC: 8.9 MG/DL (ref 8.6–10.5)
CHLORIDE SERPL-SCNC: 103 MMOL/L (ref 98–107)
CO2 SERPL-SCNC: 27.7 MMOL/L (ref 22–29)
CREAT BLD-MCNC: 1.11 MG/DL (ref 0.76–1.27)
DEPRECATED RDW RBC AUTO: 52.3 FL (ref 37–54)
EOSINOPHIL # BLD AUTO: 0.25 10*3/MM3 (ref 0–0.7)
EOSINOPHIL NFR BLD AUTO: 4.3 % (ref 0.3–6.2)
ERYTHROCYTE [DISTWIDTH] IN BLOOD BY AUTOMATED COUNT: 15 % (ref 11.5–14.5)
GFR SERPL CREATININE-BSD FRML MDRD: 68 ML/MIN/1.73
GLUCOSE BLD-MCNC: 129 MG/DL (ref 65–99)
GLUCOSE BLDC GLUCOMTR-MCNC: 107 MG/DL (ref 70–130)
GLUCOSE BLDC GLUCOMTR-MCNC: 123 MG/DL (ref 70–130)
GLUCOSE BLDC GLUCOMTR-MCNC: 160 MG/DL (ref 70–130)
GLUCOSE BLDC GLUCOMTR-MCNC: 79 MG/DL (ref 70–130)
GRAM STN SPEC: NORMAL
HCT VFR BLD AUTO: 27 % (ref 40.4–52.2)
HGB BLD-MCNC: 8.2 G/DL (ref 13.7–17.6)
IMM GRANULOCYTES # BLD: 0.09 10*3/MM3 (ref 0–0.03)
IMM GRANULOCYTES NFR BLD: 1.5 % (ref 0–0.5)
LYMPHOCYTES # BLD AUTO: 1.42 10*3/MM3 (ref 0.9–4.8)
LYMPHOCYTES NFR BLD AUTO: 24.2 % (ref 19.6–45.3)
MCH RBC QN AUTO: 29.1 PG (ref 27–32.7)
MCHC RBC AUTO-ENTMCNC: 30.4 G/DL (ref 32.6–36.4)
MCV RBC AUTO: 95.7 FL (ref 79.8–96.2)
MONOCYTES # BLD AUTO: 0.71 10*3/MM3 (ref 0.2–1.2)
MONOCYTES NFR BLD AUTO: 12.1 % (ref 5–12)
NEUTROPHILS # BLD AUTO: 3.39 10*3/MM3 (ref 1.9–8.1)
NEUTROPHILS NFR BLD AUTO: 57.7 % (ref 42.7–76)
PHOSPHATE SERPL-MCNC: 3.2 MG/DL (ref 2.5–4.5)
PLATELET # BLD AUTO: 230 10*3/MM3 (ref 140–500)
PMV BLD AUTO: 9.6 FL (ref 6–12)
POTASSIUM BLD-SCNC: 3.7 MMOL/L (ref 3.5–5.2)
RBC # BLD AUTO: 2.82 10*6/MM3 (ref 4.6–6)
SODIUM BLD-SCNC: 139 MMOL/L (ref 136–145)
UNIT  ABO: NORMAL
UNIT  RH: NORMAL
WBC NRBC COR # BLD: 5.87 10*3/MM3 (ref 4.5–10.7)

## 2018-01-10 PROCEDURE — 97110 THERAPEUTIC EXERCISES: CPT

## 2018-01-10 PROCEDURE — 82962 GLUCOSE BLOOD TEST: CPT

## 2018-01-10 PROCEDURE — 25010000002 ENOXAPARIN PER 10 MG: Performed by: INTERNAL MEDICINE

## 2018-01-10 PROCEDURE — 63710000001 INSULIN ASPART PER 5 UNITS: Performed by: INTERNAL MEDICINE

## 2018-01-10 PROCEDURE — 85025 COMPLETE CBC W/AUTO DIFF WBC: CPT | Performed by: INTERNAL MEDICINE

## 2018-01-10 PROCEDURE — 99233 SBSQ HOSP IP/OBS HIGH 50: CPT | Performed by: INTERNAL MEDICINE

## 2018-01-10 PROCEDURE — C1751 CATH, INF, PER/CENT/MIDLINE: HCPCS

## 2018-01-10 PROCEDURE — 02HV33Z INSERTION OF INFUSION DEVICE INTO SUPERIOR VENA CAVA, PERCUTANEOUS APPROACH: ICD-10-PCS | Performed by: NEUROLOGICAL SURGERY

## 2018-01-10 PROCEDURE — 25010000002 MORPHINE PER 10 MG: Performed by: INTERNAL MEDICINE

## 2018-01-10 PROCEDURE — 25010000003 PENICILLIN G POTASSIUM PER 600000 UNITS: Performed by: INTERNAL MEDICINE

## 2018-01-10 PROCEDURE — 94799 UNLISTED PULMONARY SVC/PX: CPT

## 2018-01-10 PROCEDURE — 80069 RENAL FUNCTION PANEL: CPT | Performed by: INTERNAL MEDICINE

## 2018-01-10 RX ORDER — TAMSULOSIN HYDROCHLORIDE 0.4 MG/1
0.4 CAPSULE ORAL DAILY
Qty: 30 CAPSULE | Refills: 0
Start: 2018-01-11

## 2018-01-10 RX ORDER — SODIUM CHLORIDE 0.9 % (FLUSH) 0.9 %
10 SYRINGE (ML) INJECTION AS NEEDED
Status: DISCONTINUED | OUTPATIENT
Start: 2018-01-10 | End: 2018-01-11 | Stop reason: HOSPADM

## 2018-01-10 RX ORDER — METOPROLOL TARTRATE 50 MG/1
50 TABLET, FILM COATED ORAL EVERY 8 HOURS
Qty: 1 TABLET | Refills: 0
Start: 2018-01-10

## 2018-01-10 RX ORDER — PSEUDOEPHEDRINE HCL 30 MG
100 TABLET ORAL 2 TIMES DAILY PRN
Qty: 7 CAPSULE | Refills: 0
Start: 2018-01-10

## 2018-01-10 RX ORDER — SODIUM CHLORIDE 0.9 % (FLUSH) 0.9 %
10 SYRINGE (ML) INJECTION EVERY 12 HOURS SCHEDULED
Status: DISCONTINUED | OUTPATIENT
Start: 2018-01-10 | End: 2018-01-11 | Stop reason: HOSPADM

## 2018-01-10 RX ORDER — BISACODYL 10 MG
10 SUPPOSITORY, RECTAL RECTAL DAILY
Qty: 1 SUPPOSITORY | Refills: 0
Start: 2018-01-11

## 2018-01-10 RX ORDER — GLIPIZIDE 10 MG/1
10 TABLET ORAL
Qty: 30 TABLET | Refills: 0
Start: 2018-01-11

## 2018-01-10 RX ADMIN — SODIUM CHLORIDE 4 MILLION UNITS: 0.9 INJECTION INTRAVENOUS at 12:58

## 2018-01-10 RX ADMIN — SODIUM CHLORIDE 4 MILLION UNITS: 0.9 INJECTION INTRAVENOUS at 01:51

## 2018-01-10 RX ADMIN — INSULIN ASPART 2 UNITS: 100 INJECTION, SOLUTION INTRAVENOUS; SUBCUTANEOUS at 08:26

## 2018-01-10 RX ADMIN — METOPROLOL TARTRATE 50 MG: 50 TABLET, FILM COATED ORAL at 08:24

## 2018-01-10 RX ADMIN — METOPROLOL TARTRATE 50 MG: 50 TABLET, FILM COATED ORAL at 16:44

## 2018-01-10 RX ADMIN — OXYCODONE HYDROCHLORIDE AND ACETAMINOPHEN 1 TABLET: 10; 325 TABLET ORAL at 16:44

## 2018-01-10 RX ADMIN — TAMSULOSIN HYDROCHLORIDE 0.4 MG: 0.4 CAPSULE ORAL at 08:25

## 2018-01-10 RX ADMIN — SODIUM CHLORIDE 4 MILLION UNITS: 0.9 INJECTION INTRAVENOUS at 05:56

## 2018-01-10 RX ADMIN — Medication 10 ML: at 21:40

## 2018-01-10 RX ADMIN — SODIUM CHLORIDE 4 MILLION UNITS: 0.9 INJECTION INTRAVENOUS at 16:44

## 2018-01-10 RX ADMIN — OXYCODONE HYDROCHLORIDE AND ACETAMINOPHEN 1 TABLET: 10; 325 TABLET ORAL at 00:37

## 2018-01-10 RX ADMIN — METFORMIN HYDROCHLORIDE 500 MG: 500 TABLET ORAL at 18:20

## 2018-01-10 RX ADMIN — ENOXAPARIN SODIUM 40 MG: 40 INJECTION SUBCUTANEOUS at 20:39

## 2018-01-10 RX ADMIN — SODIUM CHLORIDE 4 MILLION UNITS: 0.9 INJECTION INTRAVENOUS at 20:39

## 2018-01-10 RX ADMIN — METFORMIN HYDROCHLORIDE 500 MG: 500 TABLET ORAL at 08:25

## 2018-01-10 RX ADMIN — OXYCODONE HYDROCHLORIDE AND ACETAMINOPHEN 1 TABLET: 10; 325 TABLET ORAL at 04:31

## 2018-01-10 RX ADMIN — METOPROLOL TARTRATE 50 MG: 50 TABLET, FILM COATED ORAL at 01:52

## 2018-01-10 RX ADMIN — ATORVASTATIN CALCIUM 40 MG: 20 TABLET, FILM COATED ORAL at 08:25

## 2018-01-10 RX ADMIN — GLIPIZIDE 10 MG: 10 TABLET ORAL at 08:25

## 2018-01-10 RX ADMIN — OXYCODONE HYDROCHLORIDE AND ACETAMINOPHEN 1 TABLET: 10; 325 TABLET ORAL at 08:31

## 2018-01-10 RX ADMIN — IPRATROPIUM BROMIDE AND ALBUTEROL SULFATE 3 ML: .5; 3 SOLUTION RESPIRATORY (INHALATION) at 09:20

## 2018-01-10 RX ADMIN — OXYCODONE HYDROCHLORIDE AND ACETAMINOPHEN 1 TABLET: 10; 325 TABLET ORAL at 20:39

## 2018-01-10 RX ADMIN — MORPHINE SULFATE 4 MG: 4 INJECTION INTRAVENOUS at 10:13

## 2018-01-10 RX ADMIN — OXYCODONE HYDROCHLORIDE AND ACETAMINOPHEN 1 TABLET: 10; 325 TABLET ORAL at 12:38

## 2018-01-10 NOTE — DISCHARGE SUMMARY
PHYSICIAN DISCHARGE SUMMARY                                                                          [facility]    Patient Identification:  Name: Sam Barrett  Age: 57 y.o.  Sex: male  :  1960  MRN: [id number]  Primary Care Physician: Casey Verduzco Jr., MD    Admit date: 2017  Discharge date and time:1/10/2018  Discharged Condition: fair    Discharge Diagnoses:Principal Problem:    Sepsis  Active Problems:    Abnormal MRI, lumbar spine    Diabetes    Abscess in epidural space of lumbar spine    Abscess in epidural space of thoracic spine    Postoperative anemia due to acute blood loss    Type 2 diabetes mellitus without complication       Procedures:  Nathan 3 2018  T9 through L1 lumbar laminectomies and decompression of epidural abscess   2018: Right arm abscess along the cephalic artery I and D  · 2018: Estimated EF = 60%.  No vavular abnormalities or vegetations are seen    Hospital Course: Sam Barrett is a 57-year-old male with history of IV drug use who presents with sepsis secondary to streptococcal bacteremia S with group B strep with source being epidural abscess.  Patient underwent IV antibiotics as well as incision and drainage of this by Dr. Esparza with neurosurgery.  He also underwent a ARUNA that showed no valvular abnormalities.  He also developed a right arm abscess and underwent incision and drainage of this.  He no neurological deficits and will be discharged to a nursing home with a PIC line placed to continue his rehabilitation and IV antibiotics as recommended by Dr. Ford.  He will follow-up with urology as well for some issues with urinary retention.  He'll follow-up with neurosurgery for post surgery follow-up.  He'll follow-up with vascular surgery for his I&D follow-up.  He was also severe uncontrolled diabetic and will follow-up with endocrinology as well.  Patient will need abx as below and labwork as  "instructed below:    FINAL RECS from NSG:  Local wound care wash rinse pat dry, no bath, shower okay follow up Dr Esparza as below.      FINAL RECS FROM ID SERVICE:  \"1. Group B Strep septicemia secondary to IV drug use  -ARUNA negative; repeat blood cultures negative to date  -continue PCN G 4 million units q4h x 6 weeks with stop date 2/16/17  -weekly CBC w/ diff, BMP, CRP faxed to me at [phone]  -f/u with me in ID clinic 2/16/17 at 2:10 PM  -will order PICC since he is going to rehab unit; obviously we could not send him to his home with a PICC given IV drug use    Vascular Surgery Recs Regarding RUE wound:  Silvazorb type hydrogel covered with a border foam dressing would be quite reasonable but would defer to them.  Today, I packed it with half-inch iodoform a bit and then covered that with a 3\" x 3\" ordered foam dressing.    UROLOGY:  Check post void residuals to ensure no retention.    Admit Date: 12/23/2017  SPEECH-LANGUAGE PATHOLOGY EVALUTION - VFSS  Subjective: The patient was seen on this date for a VFSS(Videofluoroscopic Swallowing Study).  Patient was alert and cooperative.    Significant history:  VFSS 12/28 showed silent aspiration thin and penetration of honey which did not clear from laryngeal vestibule.  Objective: Risks/benefits were reviewed with the patient, and consent was obtained. The study was completed with SLP present and Radiologist review. The patient was seen in lateral view(s). Textures given included thin liquid, nectar thick liquid, honey thick liquid, puree consistency, mechanical soft consistency and regular consistency.  Assessment:  Pt presents with mild dysphagia caused by fatigue.  Initially no penetration or aspiration with thin or nectar thick liquids.  At end of study, pt began crying and took large cup drinks and began penetration on thin, nectar, and honey thick liquds.  Penetration of thin was transient, but penetration of nectar and honey remained in laryngeal vestibule.  " Patient unable to clear with cued cough.  Mild pharyngeal residue  noted.  Swallow characterized by premature spillage, mistiming, and reduced hyolaryngeal complex strength.  Adequate mastication solids with spillage to valleculae and pyriforms.   SLP Findings: Patient presents with mild to moderate oropharyngeal dysphagia.   Comments: Feel fatigue, alertness, and pt compliance with recommended strategies will be important factors in pt safety of po.  Recommendations: Diet Textures: thin liquid, mechanical soft consistency with no mixed textures food. Medications should be taken whole or crushed with puree.   If pt unable to comply with small cup sips of liquid and s/s aspiration noted bedside, will need to increase liquids to pudding thick.  (Nectar and honey thick consistencies felt to place pt at highest risk due to inability to clear penetrated material)  Recommended Strategies: Upright for PO, small bites and sips, no straw, alternate liquids and solids and supervision with all PO to assure small cup sips of liquid. Oral care before breakfast, after all meals and PRN.  Dysphagia therapy is recommended. Rationale: improve safety of swallow.  Consults:   IP CONSULT TO CASE MANAGEMENT   IP CONSULT TO NUTRITION SERVICES  IP CONSULT TO INFECTIOUS DISEASES  IP CONSULT TO NEUROSURGERY  IP CONSULT TO SPIRITUAL CARE  IP CONSULT TO ENDOCRINOLOGY  IP CONSULT TO CARDIOLOGY  IP CONSULT TO CASE MANAGEMENT   IP CONSULT TO ACCESS CENTER  IP CONSULT TO VASCULAR SURGERY  IP CONSULT TO UROLOGY  IP CONSULT TO IV TEAM    Significant Diagnostic Studies:     Results from last 7 days  Lab Units 01/10/18  0531 01/09/18  0434 01/04/18  1246 01/04/18 0527 01/03/18 2013   WBC 10*3/mm3 5.87 6.02 9.33 10.31  --    HEMOGLOBIN g/dL 8.2* 7.6* 9.4* 9.3* 9.4*   PLATELETS 10*3/mm3 230 255 316 367  --      Results from last 7 days  Lab Units 01/10/18  0531 01/09/18  0434 01/04/18 0527   SODIUM mmol/L 139 139 133*    POTASSIUM mmol/L 3.7 3.3* 5.0   CHLORIDE mmol/L 103 104 97*   CO2 mmol/L 27.7 25.2 24.0   BUN mg/dL 17 18 24*   CREATININE mg/dL 1.11 1.15 1.18   GLUCOSE mg/dL 129* 116* 147*   CALCIUM mg/dL 8.9 9.1 9.0   PHOSPHORUS mg/dL 3.2 3.0  --    Estimated Creatinine Clearance: 110.1 mL/min (by C-G formula based on Cr of 1.11).    Discharge Exam:  Temp:  [97.8 °F (36.6 °C)-98.4 °F (36.9 °C)] 98 °F (36.7 °C)  Heart Rate:  [] 83  Resp:  [16-24] 24  BP: (103-131)/(67-89) 131/81  GENERAL:  NAD, Aox3No distress currently.   HEENT:  EOMI, nonicteric sclera, no JVD  PULMONARY:    No resp distress CTAB, no wheeze, no crackles, no rhonchi, symmetric air entry  CARDIAC:  RRR no MRG, S1 S2  ABD: SNTND BS+  EXT: RUE with bandage minimal edema strength 5/5 symmetric today, pulses symmetric 2+   NEURO:  CNs II-XII intact, no focal deficits  SKIN: as above  PSYCH: appropriate mood     Disposition:  Skilled nursing facility    Patient Instructions:      Your medication list      START taking these medications       Instructions Last Dose Given Next Dose Due    bisacodyl 10 MG suppository   Commonly known as:  DULCOLAX   Start taking on:  1/11/2018        Insert 1 suppository into the rectum Daily.         docusate sodium 100 MG capsule        Take 100 mg by mouth 2 (Two) Times a Day As Needed for Constipation.         glipiZIDE 10 MG tablet   Commonly known as:  GLUCOTROL   Start taking on:  1/11/2018        Take 1 tablet by mouth Every Morning Before Breakfast.         Influenza Vac Subunit Quad 0.5 ML suspension prefilled syringe injection   Commonly known as:  FLUCELVAX        Inject 0.5 mL into the shoulder, thigh, or buttocks During Hospitalization (if has nto recieved this year) for up to 1 dose.         insulin aspart 100 UNIT/ML injection   Commonly known as:  novoLOG        Inject 2-5 Units under the skin 4 (Four) Times a Day Before Meals & at Bedtime.         metFORMIN 500 MG tablet   Commonly known as:  GLUCOPHAGE         Take 1 tablet by mouth 2 (Two) Times a Day With Meals.         metoprolol tartrate 50 MG tablet   Commonly known as:  LOPRESSOR        Take 1 tablet by mouth Every 8 (Eight) Hours.         penicillin G potassium 4 Million Units in sodium chloride 0.9 % 100 mL IVPB        Infuse 4 Million Units into a venous catheter Every 4 (Four) Hours for 228 doses. Indications: Bacteria in the Blood         sodium hypochlorite in sterile water irrigation topical solution 0.0625%        Irrigate with 946 mL as directed Every 12 (Twelve) Hours.         tamsulosin 0.4 MG capsule 24 hr capsule   Commonly known as:  FLOMAX   Start taking on:  1/11/2018        Take 1 capsule by mouth Daily.           CONTINUE taking these medications       Instructions Last Dose Given Next Dose Due    aspirin 81 MG EC tablet              atorvastatin 40 MG tablet   Commonly known as:  LIPITOR              baclofen 10 MG tablet   Commonly known as:  LIORESAL              gabapentin 300 MG capsule   Commonly known as:  NEURONTIN              hydrochlorothiazide 12.5 MG tablet   Commonly known as:  HYDRODIURIL              lisinopril 5 MG tablet   Commonly known as:  PRINIVIL,ZESTRIL              meloxicam 15 MG tablet   Commonly known as:  MOBIC              Morphine 15 MG 12 hr tablet   Commonly known as:  MS CONTIN              oxyCODONE 15 MG immediate release tablet   Commonly known as:  ROXICODONE                   Where to Get Your Medications      Information about where to get these medications is not yet available     ! Ask your nurse or doctor about these medications    • bisacodyl 10 MG suppository   • docusate sodium 100 MG capsule   • glipiZIDE 10 MG tablet   • Influenza Vac Subunit Quad 0.5 ML suspension prefilled syringe injection   • insulin aspart 100 UNIT/ML injection   • metFORMIN 500 MG tablet   • metoprolol tartrate 50 MG tablet   • penicillin G potassium 4 Million Units in sodium chloride 0.9 % 100 mL IVPB   • sodium hypochlorite in  sterile water irrigation topical solution 0.0625%   • tamsulosin 0.4 MG capsule 24 hr capsule               Follow-up Information     Follow up with KIRSTIE FONG .    Specialties:  Skilled Nursing Facility, Intermediate Care Facility    Contact information:    [address]  [facility] [address] [phone]        Follow up with Nghia Esparza MD .    Specialties:  Neurosurgery, Radiology    Why:  1/18/18 at 3:15 pm for wound check- arrive at 2:45-3 pm for paperwork;     Contact information:    [address]  [place] [address]  [phone]          Follow up with [facility] .    Specialty:  Wound Care    Why:  Right upper extremity wounds.  Follow-up here if wounds still open at discharge from skilled nursing facility.    Contact information:    [address] [facility] [address] [phone]        Follow up with Quintin Lance MD. Call today.    Specialty:  Urology    Contact information:    [address] [facility] [place] [address]  [phone]          Follow up with Casey Verduzco Jr., MD .    Specialty:  Family Medicine    Contact information:    [address]  [place] [address]  [phone]          Follow up with Bowen Ford MD .    Specialty:  Infectious Diseases    Contact information:    [address]  [place] [address]  [phone]          Follow up with Regulo Bowers MD .    Specialty:  Vascular Surgery    Contact information:    [address]  [place] [address]  [phone]               Medication Reconciliation: Please see electronically completed Med Rec.    Total time spent discharging patient including evaluation, medication reconciliation, arranging follow up, and post hospitalization instructions and education total time exceeds 30 minutes.    Signed:  Ethan Frank MD  1/10/2018  3:42 PM

## 2018-01-10 NOTE — NURSING NOTE
PT seen for right upper arm wound and drsg recommendations.  Wound is clean, pink, measures  3 x 1 x 1 cm; tunnels 9 cms at 12 o'clock.  Cleasned with saline, lightly packed with strip of Opticell AG, then covered with silicone border drsg.  Discussed tx plan with RN and pt.

## 2018-01-10 NOTE — DISCHARGE INSTRUCTIONS
Lumbar incision care- wash, rinse, pat dry; OK to shower but do not direct spray directly onto incision and do not submerge wound in tub. No need for ointments and avoid lotions on incision; OK to cover with dry dressing to avoid catching staples on clothing. Call Dr. Esparza for any wound concerns

## 2018-01-10 NOTE — PROGRESS NOTES
"Cc voiding sx  Patient required cath last night for large bladder vol  Has subsequently voided spont.     LOS: 18 days   Patient Care Team:  Casey Verduzco Jr., MD as PCP - General (Family Medicine)        Subjective     History of Present Illness    Subjective      Objective     Vital Signs  Temp:  [97.8 °F (36.6 °C)-98.4 °F (36.9 °C)] 97.8 °F (36.6 °C)  Heart Rate:  [] 81  Resp:  [17-22] 17  BP: (103-127)/(67-87) 122/87    Objective:  General Appearance:  Comfortable.    Vital signs: (most recent): Blood pressure 122/87, pulse 81, temperature 97.8 °F (36.6 °C), temperature source Oral, resp. rate 17, height 195.6 cm (77.01\"), weight 132 kg (291 lb 1.6 oz), SpO2 95 %.  Vital signs are normal.    HEENT: Normal HEENT exam.    Lungs:  Normal respiratory rate and normal effort.    Abdomen: Abdomen is soft.              Results Review:  New clinical results reviewed.        Assessment/Plan     Principal Problem:    Sepsis  Active Problems:    Abnormal MRI, lumbar spine    Diabetes    Abscess in epidural space of lumbar spine    Abscess in epidural space of thoracic spine    Postoperative anemia due to acute blood loss    Type 2 diabetes mellitus without complication      Assessment:  (Intermittent retention   will, aask the rn to check pvrs today).       Quintin Lance MD  01/10/18  7:08 AM            "

## 2018-01-10 NOTE — PLAN OF CARE
Problem: Patient Care Overview (Adult)  Goal: Plan of Care Review  Outcome: Ongoing (interventions implemented as appropriate)   01/10/18 1119   Patient Care Overview   Progress progress toward functional goals is gradual   Outcome Evaluation   Outcome Summary/Follow up Plan Pt is very motivated & cooperative in therapy. Pain and overall weakness limit tolerance to activity but pt is progressing slowly.

## 2018-01-10 NOTE — PROGRESS NOTES
[facility]    Physicians Statement of Medical Necessity for Ambulance Transportation    It is medically necessary for:    Patient Name: Sam Barrett    Insurance Information:      To be transported by ambulance:    From (if nursing facility, specify level of care: skilled, long-term, etc): [facility]    To (specify level of care if nursing facility): [facility]    Date of Service: 1/10/18    For dialysis patients state date dialysis began:     Diagnosis: Sepsis    Past Medical/Surgical History:  Past Medical History:   Diagnosis Date   • Drug abuse, IV    • Hepatitis C       Past Surgical History:   Procedure Laterality Date   • INCISION AND DRAINAGE ARM Right 1/7/2018    Procedure: INCISION AND DRAINAGE RIGHT ARM;  Surgeon: Regulo Bowers MD;  Location: [facility];  Service:    • THORACIC LAMINECTOMY DECOMPRESSIVE POSTERIOR N/A 1/3/2018    Procedure: T9 through S1 posterior lumbar decompression for epidural abscess.;  Surgeon: Nghia Esparza MD;  Location: [facility] OR;  Service:         Current Objective Medical Evidence(including physical exam finding to support reason for limitations):    Immobilization syndrome  Bedridden    Other: Non ambulatory; fall risk    Physician Signature:           (RN,NP,PA,CAN, Discharge Planner) Date/Time: 1/10/18 4089     Printed Name:    __________________________________    [facility]   Phone: [phone] Phone: [phone] Phone: [phone]   Fax: [phone] Fax: [phone] Fax: [phone]

## 2018-01-10 NOTE — PROGRESS NOTES
"Dressings changed at bedside.  Wounds are about 1 inch in length and about 1 cm deep.  There is some tunneling but I have not been able to pack that well because of pain.  Drain was removed.    I did ask the wound care nurses to come see him for recommendations regarding dressings.  Hopefully we can get to something is changed once a day or so.  I think is Silvazorb type hydrogel covered with a border foam dressing would be quite reasonable but would defer to them.  Today, I packed it with half-inch iodoform a bit and then covered that with a 3\" x 3\" ordered foam dressing.    If patient goes home today they should continue local wound care either as I did today or per wound care recommendations.    Ideally, patient could get follow-up at the wound care center once discharge from his subacute facility if the wounds are still open.  "

## 2018-01-10 NOTE — PROGRESS NOTES
Continued Stay Note  [facility]     Patient Name: Sam Barrett  MRN: [id number]  Today's Date: 1/10/2018    Admit Date: 12/23/2017          Discharge Plan       01/10/18 1724    Case Management/Social Work Plan    Plan skilled care at [facility]    Additional Comments Patient discharged to skilled care at [facility].  [facility] to transport patient at 2200.  Called [facility] ambulance and they could not  until 2300.  Informed Fidel with [facility] and patient of transfer time of 2200.  Patient states he has no family and did not have anyone for [facility] to contact concerning his discharge.    Final Note    Final Note Skilled care at [facility].              Discharge Codes     None        Expected Discharge Date and Time     Expected Discharge Date Expected Discharge Time    Nathan 10, 2018             Vivienne Khan RN

## 2018-01-10 NOTE — THERAPY TREATMENT NOTE
Acute Care - Physical Therapy Treatment Note  [facility]     Patient Name: Sam Barrett  : 1960  MRN: [id number]  Today's Date: 1/10/2018  Onset of Illness/Injury or Date of Surgery Date: 17          Admit Date: 2017    Visit Dx:    ICD-10-CM ICD-9-CM   1. Abnormal MRI, lumbar spine R93.7 793.7   2. 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   • Abscess in epidural space of thoracic spine   • Postoperative anemia due to acute blood loss   • Type 2 diabetes mellitus without complication               Adult Rehabilitation Note       01/10/18 1113 18 1500       Rehab Assessment/Intervention    Discipline physical therapist  -EF physical therapy assistant  -CW     Document Type therapy note (daily note)  -EF therapy note (daily note)  -CW     Subjective Information agree to therapy;complains of;pain  -EF agree to therapy;complains of;pain  -CW     Patient Effort, Rehab Treatment good  -EF adequate  -CW     Symptoms Noted During/After Treatment fatigue;increased pain  -EF      Precautions/Limitations fall precautions;spinal precautions  -EF fall precautions;spinal precautions  -CW     Recorded by [EF] Ruthy Elaine, PT [CW] Rizwan Cosme, PTA     Vital Signs    O2 Delivery Pre Treatment  room air  -CW     Recorded by  [CW] Rizwan Cosme, PTA     Pain Assessment    Pain Assessment 0-10  -EF 0-10  -CW     Pain Score 8  -EF 8  -CW     Post Pain Score 8  -EF 8  -CW     Pain Type Acute pain;Surgical pain  -EF Acute pain;Surgical pain  -CW     Pain Location Back  -EF Back  -CW     Pain Intervention(s) Medication (See MAR);Repositioned;Ambulation/increased activity  -EF Repositioned;Ambulation/increased activity  -CW     Response to Interventions tolerated  -EF see  -CW     Recorded by [EF] Ruthy Elaine, PT [CW] Rizwan Cosme, PTA      Cognitive Assessment/Intervention    Current Cognitive/Communication Assessment  functional  -CW     Orientation Status  oriented x 4  -CW     Follows Commands/Answers Questions  100% of the time  -CW     Personal Safety  WNL/WFL  -CW     Personal Safety Interventions  fall prevention program maintained;gait belt;muscle strengthening facilitated;nonskid shoes/slippers when out of bed  -CW     Recorded by  [CW] Rizwan Cosme PTA     Bed Mobility, Assessment/Treatment    Bed Mobility, Assistive Device bed rails  -EF      Bed Mob, Supine to Sit, [place] minimum assist (75% patient effort);moderate assist (50% patient effort);2 person assist required  -EF moderate assist (50% patient effort)  -CW     Bed Mob, Sit to Supine, [place] minimum assist (75% patient effort);moderate assist (50% patient effort);2 person assist required  -EF moderate assist (50% patient effort)  -CW     Bed Mobility, Safety Issues decreased use of arms for pushing/pulling;decreased use of legs for bridging/pushing  -EF      Bed Mobility, Impairments strength decreased;pain  -EF      Recorded by [EF] Ruthy Elaine, PT [CW] Rizwan Cosme PTA     Transfer Assessment/Treatment    Transfers, Bed-Chair [place] unable to perform  -EF      Transfers, Sit-Stand [place]  not tested  -CW     Transfer, Comment  pt did not want to stand due to sores on his feet and increased pain  -CW     Recorded by [EF] Ruthy Elaine, PT [CW] Rizwan Cosme, PTA     Gait Assessment/Treatment    Gait, [place] Level unable to perform  -EF      Recorded by [EF] Ruthy Elaine PT      Balance Skills Training    Sitting-Level of Assistance Close supervision  -EF      Sitting-Balance Support Right upper extremity supported;Left upper extremity supported;Feet supported  -EF      Sitting # of Minutes 8  -EF      Recorded by [EF] Ruthy Elaine PT      Therapy Exercises    Bilateral Lower Extremities AROM:;10 reps;15  reps;sitting;ankle pumps/circles;LAQ  -EF AROM:;10 reps;sitting;ankle pumps/circles;glut sets;hip flexion;LAQ  -CW     Right Upper Extremity AAROM:;AROM:;5 reps;10 reps;sitting;hand pumps;elbow flexion/extension;shoulder extension/flexion  -EF      Left Upper Extremity AROM:;5 reps;10 reps;sitting;hand pumps;shoulder abduction/adduction;elbow flexion/extension  -EF      Recorded by [EF] Ruthy Elaine, PT [CW] Rizwan Cosme PTA     Positioning and Restraints    Pre-Treatment Position in bed  -EF in bed  -CW     Post Treatment Position bed  -EF bed  -CW     In Bed supine;call light within reach;encouraged to call for assist;exit alarm on  -EF notified nsg;supine;call light within reach;encouraged to call for assist  -CW     Recorded by [EF] Ruthy Elaine, PT [CW] Rizwan Cosme PTA       User Key  (r) = Recorded By, (t) = Taken By, (c) = Cosigned By    Initials Name Effective Dates    EF Ruthy Elaine, PT 04/24/15 -     CW Rizwan Cosme, AFSHAN 12/13/16 -                 IP PT Goals       01/04/18 1700          Bed Mobility PT LTG    Bed Mobility PT LTG, Time to Achieve 1 wk  -CH      Bed Mobility PT LTG, Activity Type all bed mobility  -CH      Bed Mobility PT LTG, [place] minimum assist (75% patient effort)  -CH      Transfer Training PT LTG    Transfer Training PT LTG, Time to Achieve 1 wk  -CH      Transfer Training PT LTG, Activity Type all transfers  -CH      Transfer Training PT LTG, [place] minimum assist (75% patient effort)  -CH      Transfer Training PT LTG, Assist Device walker, rolling  -CH      Gait Training PT LTG    Gait Training Goal PT LTG, Time to Achieve 1 wk  -CH      Gait Training Goal PT LTG, [place] Level minimum assist (75% patient effort)  -CH      Gait Training Goal PT LTG, Assist Device walker, rolling  -CH      Gait Training Goal PT LTG, Distance to Achieve 30  -CH        User Key  (r) = Recorded By, (t) = Taken By, (c) = Cosigned By     Initials Name Provider Type    DAYV Ko PT Physical Therapist          Physical Therapy Education     Title: PT OT SLP Therapies (Done)     Topic: Physical Therapy (Done)     Point: Mobility training (Done)    Learning Progress Summary    Learner Readiness Method Response Comment Documented by Status   Patient Acceptance E,TB VU,DU   01/09/18 1509 Done    Acceptance E,TB,D VU,NR   01/05/18 1525 Done    Acceptance E,TB,D VU,NR   01/04/18 1700 Done    Acceptance E,D VU,NR   01/02/18 1549 Done    Acceptance D VU,NR   12/31/17 1017 Done    Acceptance E VU  MA 12/29/17 1034 Done    Acceptance E,D NR,VU   12/28/17 1330 Done    Acceptance E,TB,D VU,NR   12/27/17 1617 Done    Acceptance E,TB,D VU,NR   12/26/17 1303 Done               Point: Home exercise program (Done)    Learning Progress Summary    Learner Readiness Method Response Comment Documented by Status   Patient Acceptance E,TB VU,DU   01/09/18 1509 Done    Acceptance E,TB,D VU,[facility] 01/04/18 1700 Done    Acceptance D VU,NR   12/31/17 1017 Done    Acceptance E,TB,D VU,NR   12/26/17 1303 Done               Point: Body mechanics (Done)    Learning Progress Summary    Learner Readiness Method Response Comment Documented by Status   Patient Acceptance E,TB VU,DU   01/09/18 1509 Done    Acceptance E,TB,D VU,NR   01/05/18 1525 Done    Acceptance E,TB,D VU,NR   01/04/18 1700 Done    Acceptance E,D VU,NR   01/02/18 1549 Done    Acceptance D VU,NR   12/31/17 1017 Done    Acceptance E VU  MA 12/29/17 1034 Done    Acceptance E,D NR,VU   12/28/17 1330 Done    Acceptance E,TB,D VU,[facility] 12/27/17 1617 Done    Acceptance E,TB,D VU,NR   12/26/17 1303 Done               Point: Precautions (Done)    Learning Progress Summary    Learner Readiness Method Response Comment Documented by Status   Patient Acceptance E,TB VU,DU   01/09/18 1509 Done    Acceptance E,TB,D VU,NR   01/05/18 1525 Done    Acceptance E,TB,D [place],NR   01/04/18  1700 Done    Acceptance D VU,NR   12/31/17 1017 Done    Acceptance E VU  MA 12/29/17 1034 Done    Acceptance E,TB,D VU,NR   12/27/17 1617 Done    Acceptance E,TB,D VU,NR   12/26/17 1303 Done                      User Key     Initials Effective Dates Name Provider Type Discipline     05/18/15 -  Marianela Pedro, PT Physical Therapist PT     12/01/15 -  Sheridan Ko, PT Physical Therapist PT    EJ 04/21/17 -  Dana Farias, PT Physical Therapist PT    RW 04/06/16 -  Gianna Moore, PTA Physical Therapy Assistant PT    MA 12/13/16 -  Mariann Vang, PT Physical Therapist PT     12/13/16 -  Rizwan Cosme, PTA Physical Therapy Assistant PT                    PT Recommendation and Plan  Anticipated Discharge Disposition: skilled nursing facility  Planned Therapy Interventions: balance training, bed mobility training, gait training, home exercise program, patient/family education, transfer training  PT Frequency: daily  Plan of Care Review  Progress: progress toward functional goals is gradual  Outcome Summary/Follow up Plan: Pt is very motivated & cooperative in therapy. Pain and overall weakness limit tolerance to activity but pt is progressing slowly.          Outcome Measures       01/10/18 1100 01/09/18 1500       How much help from another person do you currently need...    Turning from your back to your side while in flat bed without using bedrails? 2  -EF 2  -CW     Moving from lying on back to sitting on the side of a flat bed without bedrails? 2  -EF 2  -CW     Moving to and from a bed to a chair (including a wheelchair)? 1  -EF 1  -CW     Standing up from a chair using your arms (e.g., wheelchair, bedside chair)? 1  -EF 1  -CW     Climbing 3-5 steps with a railing? 1  -EF 1  -CW     To walk in hospital room? 1  -EF 1  -CW     AM-PAC 6 Clicks Score 8  -EF 8  -CW     Functional Assessment    Outcome Measure Options AM-PAC 6 Clicks Basic Mobility (PT)  -EF AM-PAC 6 Clicks Basic  Mobility (PT)  -CW       User Key  (r) = Recorded By, (t) = Taken By, (c) = Cosigned By    Initials Name Provider Type    EF Ruthy Elaine, PT Physical Therapist    CW Rizwan Cosme, PTA Physical Therapy Assistant           Time Calculation:         PT Charges       01/10/18 1123          Time Calculation    Start Time 1054  -EF      Stop Time 1113  -EF      Time Calculation (min) 19 min  -EF      PT Received On 01/10/18  -EF      PT - Next Appointment 01/11/18  -EF        User Key  (r) = Recorded By, (t) = Taken By, (c) = Cosigned By    Initials Name Provider Type    EF Ruthy Elaine, PT Physical Therapist          Therapy Charges for Today     Code Description Service Date Service Provider Modifiers Qty    83584247290 HC PT THER PROC EA 15 MIN 1/10/2018 Ruthy Elaine, PT GP 1    38434227058 HC PT THER SUPP EA 15 MIN 1/10/2018 Ruthy Elaine, PT GP 1          PT G-Codes  Outcome Measure Options: AM-PAC 6 Clicks Basic Mobility (PT)    Ruthy Elaine, PT  1/10/2018

## 2018-01-10 NOTE — PROGRESS NOTES
Case Management Discharge Note    Final Note: Skilled care at [place].    Discharge Placement     Facility/Agency Request Status Selected? Address Phone Number Fax Number    [facility] Accepted    Yes [address] [place] [address] [phone]        Martha Payan LCSW 12/29/2017 16:40    Pt will need [employer] Medicare precert               [facility] Accepted     [address] [address] [phone]        Ale Childress, RN 12/26/2017 14:47    VM left for [facility] Accepted     [address] [facility] [address] [phone]    [facility] Sent     [address] [place] [address] [phone]    [facility] Pending - Request Sent     [address] [facility] [address] [phone]    [facility] Declined     [address] [facility] [address] [phone]

## 2018-01-10 NOTE — DISCHARGE INSTR - ACTIVITY
Wound Care: Every other day as long as it is not saturated.   Pack with optiosilver and cover with optifoam.

## 2018-01-10 NOTE — PROGRESS NOTES
57 y.o.   LOS: 18 days   Patient Care Team:  Casey Verduzco Jr., MD as PCP - General (Family Medicine)    Chief Complaint:  Uncontrolled type 2 diabetes mellitus  New Diagnosis    No chief complaint on file.      Subjective     HPI  Patient is currently eating by mouth  His tolerating the food  He had spine surgery and is recovering  He will probably go to the rehabilitation  He's currently being managed on Tradjenta and glipizide  Blood sugars appear to be in the 100 range and well controlled      Interval History:      Patient is a 57-year-old white male transferred from [facility] for suspected spinal epidural abscess.  Patient apparently had an MRI for chronic back pain and weakness in the legs and was suspected to have an epidural abscess and was transferred to [facility].  He is currently being evaluated by neurosurgery  Apparently his blood sugars were greater than 700 at [facility] and his hemoglobin A1c is currently 11.8%.  With this information was concentrated for further managing patient's diabetes during this hospitalization.      Patient is a very poor historian.  He appears to be dozing on and off.  The family member was present at the bedside but was not very helpful in offering any history  Patient denies any family history of diabetes  He reports that he has been losing weight over the past several months related he thinks he lost more than 30 pounds.  He denies any excess thirst or polyuria or nocturia.  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  He reports  tingling and numbness and burning in both feet but believes it is possibly from his back     Review of Systems:      Review of Systems   Constitutional: Positive for fatigue.   Eyes: Negative.    Respiratory: Positive for cough.    Cardiovascular: Negative.    Gastrointestinal: Negative.    Neurological: Positive for weakness and numbness.   All other systems reviewed and are negative.    Objective     Vital Signs   Temp:  [97.8 °F (36.6 °C)-98.4 °F (36.9 °C)] 97.8 °F (36.6 °C)  Heart Rate:  [] 85  Resp:  [17-20] 17  BP: (103-131)/(67-89) 131/89    Physical Exam:  Physical Exam   Constitutional: He is oriented to person, place, and time.   Neck: Normal range of motion. Neck supple.   Cardiovascular: Normal rate, regular rhythm, normal heart sounds and intact distal pulses.    Pulmonary/Chest: Effort normal and breath sounds normal.   Abdominal: Soft. Bowel sounds are normal. He exhibits distension.   Musculoskeletal: Normal range of motion. He exhibits no edema.   Neurological: He is alert and oriented to person, place, and time.   Skin: Skin is warm and dry.   Nursing note and vitals reviewed.  Results Review:     I reviewed the patient's new clinical results.      Glucose   Date/Time Value Ref Range Status   01/10/2018 0531 129 (H) 65 - 99 mg/dL Final   01/09/2018 0434 116 (H) 65 - 99 mg/dL Final     Lab Results (last 72 hours)     Procedure Component Value Units Date/Time    POC Glucose Once [[id number]]  (Normal) Collected:  01/07/18 1115    Specimen:  Blood Updated:  01/07/18 1117     Glucose 125 mg/dL     Narrative:       Meter: [id number] : [id number] Smailagic Berina    POC Glucose Once [[id number]]  (Abnormal) Collected:  01/07/18 1611    Specimen:  Blood Updated:  01/07/18 1612     Glucose 66 (L) mg/dL     Narrative:       Meter: [id number] : [id number] Sammi HDZ    POC Glucose Once [[id number]]  (Normal) Collected:  01/07/18 2047    Specimen:  Blood Updated:  01/07/18 2049     Glucose 71  mg/dL     Narrative:       Meter: [id number] : [id number] Behzad HDZ    POC Glucose Once [[id number]]  (Normal) Collected:  01/08/18 0658    Specimen:  Blood Updated:  01/08/18 0700     Glucose 107 mg/dL     Narrative:       Meter: [id number] : [id number] Audra Fuller RN    Anaerobic Culture - Swab, Spine, Thoracic [[id number]]  (Normal) Collected:  01/03/18 1639    Specimen:  Swab from Spine, Thoracic Updated:  01/08/18 1137     Culture No anaerobes isolated    Anaerobic Culture - Tissue, Spine, Thoracic [[id number]]  (Normal) Collected:  01/03/18 1642    Specimen:  Tissue from Spine, Thoracic Updated:  01/08/18 1138     Culture No anaerobes isolated    Anaerobic Culture - Tissue, Spine, Thoracic [[id number]]  (Normal) Collected:  01/03/18 1656    Specimen:  Tissue from Spine, Thoracic Updated:  01/08/18 1138     Culture No anaerobes isolated    POC Glucose Once [[id number]]  (Normal) Collected:  01/08/18 1138    Specimen:  Blood Updated:  01/08/18 1146     Glucose 120 mg/dL     Narrative:       Meter: [id number] : [id number] Sammi HDZ    POC Glucose Once [[id number]]  (Normal) Collected:  01/08/18 1656    Specimen:  Blood Updated:  01/08/18 1658     Glucose 86 mg/dL     Narrative:       Meter: [id number] : [id number] Marie Goel CNA    Fungus Culture - Tissue, Spine, Thoracic [[id number]] Collected:  01/03/18 1642    Specimen:  Tissue from Spine, Thoracic Updated:  01/08/18 1731     Fungus Culture No fungus isolated at less than 1 week    AFB Culture - Tissue, Spine, Thoracic [[id number]]  (Normal) Collected:  01/03/18 1642    Specimen:  Tissue from Spine, Thoracic Updated:  01/08/18 1731     AFB Culture No AFB isolated at less than 1 week     AFB Stain No acid fast bacilli seen on direct smear    Fungus Culture - Tissue, Spine, Thoracic [[id number]] Collected:  01/03/18 1656    Specimen:  Tissue from Spine, Thoracic Updated:  01/08/18 1731     Fungus Culture No fungus isolated at less  than 1 week    AFB Culture - Tissue, Spine, Thoracic [[id number]]  (Normal) Collected:  01/03/18 1656    Specimen:  Tissue from Spine, Thoracic Updated:  01/08/18 1731     AFB Culture No AFB isolated at less than 1 week     AFB Stain No acid fast bacilli seen on direct smear    POC Glucose Once [[id number]]  (Abnormal) Collected:  01/08/18 1945    Specimen:  Blood Updated:  01/08/18 1946     Glucose 199 (H) mg/dL     Narrative:       Meter: [id number] : [id number] Vince Sullivan    CBC & Differential [[id number]] Collected:  01/09/18 0434    Specimen:  Blood Updated:  01/09/18 0524    Narrative:       The following orders were created for panel order CBC & Differential.  Procedure                               Abnormality         Status                     ---------                               -----------         ------                     CBC Auto Differential[[id number]]        Abnormal            Final result                 Please view results for these tests on the individual orders.    CBC Auto Differential [[id number]]  (Abnormal) Collected:  01/09/18 0434    Specimen:  Blood Updated:  01/09/18 0524     WBC 6.02 10*3/mm3      RBC 2.59 (L) 10*6/mm3      Hemoglobin 7.6 (L) g/dL      Hematocrit 25.1 (L) %      MCV 96.9 (H) fL      MCH 29.3 pg      MCHC 30.3 (L) g/dL      RDW 14.8 (H) %      RDW-SD 51.6 fl      MPV 9.7 fL      Platelets 255 10*3/mm3      Neutrophil % 62.3 %      Lymphocyte % 24.9 %      Monocyte % 9.3 %      Eosinophil % 2.5 %      Basophil % 0.2 %      Immature Grans % 0.8 (H) %      Neutrophils, Absolute 3.75 10*3/mm3      Lymphocytes, Absolute 1.50 10*3/mm3      Monocytes, Absolute 0.56 10*3/mm3      Eosinophils, Absolute 0.15 10*3/mm3      Basophils, Absolute 0.01 10*3/mm3      Immature Grans, Absolute 0.05 (H) 10*3/mm3     Renal Function Panel [[id number]]  (Abnormal) Collected:  01/09/18 0434    Specimen:  Blood Updated:  01/09/18 0550     Glucose 116 (H) mg/dL      BUN 18 mg/dL       Creatinine 1.15 mg/dL      Sodium 139 mmol/L      Potassium 3.3 (L) mmol/L      Chloride 104 mmol/L      CO2 25.2 mmol/L      Calcium 9.1 mg/dL      Albumin 2.00 (L) g/dL      Phosphorus 3.0 mg/dL      Anion Gap 9.8 mmol/L      BUN/Creatinine Ratio 15.7     eGFR Non African Amer 66 mL/min/1.73     POC Glucose Once [[id number]]  (Normal) Collected:  01/09/18 0721    Specimen:  Blood Updated:  01/09/18 0722     Glucose 130 mg/dL     Narrative:       Meter: [id number] : [id number] TigerTradea    POC Glucose Once [[id number]]  (Normal) Collected:  01/09/18 1146    Specimen:  Blood Updated:  01/09/18 1154     Glucose 118 mg/dL     Narrative:       Meter: [id number] : [id number] Hannah Resendez    POC Glucose Once [[id number]]  (Normal) Collected:  01/09/18 1715    Specimen:  Blood Updated:  01/09/18 1728     Glucose 121 mg/dL     Narrative:       Meter: [id number] : [id number] Makharadze Firuza    POC Glucose Once [[id number]]  (Abnormal) Collected:  01/09/18 1958    Specimen:  Blood Updated:  01/09/18 2000     Glucose 149 (H) mg/dL     Narrative:       Meter: [id number] : [id number] Vince Sullivan    CBC & Differential [[id number]] Collected:  01/10/18 0531    Specimen:  Blood Updated:  01/10/18 0625    Narrative:       The following orders were created for panel order CBC & Differential.  Procedure                               Abnormality         Status                     ---------                               -----------         ------                     CBC Auto Differential[[id number]]        Abnormal            Final result                 Please view results for these tests on the individual orders.    CBC Auto Differential [[id number]]  (Abnormal) Collected:  01/10/18 0531    Specimen:  Blood Updated:  01/10/18 0625     WBC 5.87 10*3/mm3      RBC 2.82 (L) 10*6/mm3      Hemoglobin 8.2 (L) g/dL      Hematocrit 27.0 (L) %      MCV 95.7 fL      MCH 29.1 pg      MCHC 30.4 (L) g/dL      RDW 15.0 (H) %       RDW-SD 52.3 fl      MPV 9.6 fL      Platelets 230 10*3/mm3      Neutrophil % 57.7 %      Lymphocyte % 24.2 %      Monocyte % 12.1 (H) %      Eosinophil % 4.3 %      Basophil % 0.2 %      Immature Grans % 1.5 (H) %      Neutrophils, Absolute 3.39 10*3/mm3      Lymphocytes, Absolute 1.42 10*3/mm3      Monocytes, Absolute 0.71 10*3/mm3      Eosinophils, Absolute 0.25 10*3/mm3      Basophils, Absolute 0.01 10*3/mm3      Immature Grans, Absolute 0.09 (H) 10*3/mm3     Renal Function Panel [[id number]]  (Abnormal) Collected:  01/10/18 0531    Specimen:  Blood Updated:  01/10/18 0654     Glucose 129 (H) mg/dL      BUN 17 mg/dL      Creatinine 1.11 mg/dL      Sodium 139 mmol/L      Potassium 3.7 mmol/L      Chloride 103 mmol/L      CO2 27.7 mmol/L      Calcium 8.9 mg/dL      Albumin 1.80 (L) g/dL      Phosphorus 3.2 mg/dL      Anion Gap 8.3 mmol/L      BUN/Creatinine Ratio 15.3     eGFR Non African Amer 68 mL/min/1.73     POC Glucose Once [[id number]]  (Abnormal) Collected:  01/10/18 0730    Specimen:  Blood Updated:  01/10/18 0731     Glucose 160 (H) mg/dL     Narrative:       Meter: [id number] : [id number] Marie Manasajesus BEAR    Wound Culture - Wound, Arm, Right [[id number]]  (Normal) Collected:  01/07/18 0808    Specimen:  Wound from Arm, Right Updated:  01/10/18 0855     Wound Culture No growth at 3 days     Gram Stain Result Moderate (3+) Red blood cells      Rare (1+) WBCs seen      No organisms seen        Imaging Results (last 72 hours)     ** No results found for the last 72 hours. **          Medication Review:       Current Facility-Administered Medications:   •  acetaminophen (TYLENOL) tablet 650 mg, 650 mg, Oral, Q4H PRN, 650 mg at 12/31/17 0516 **OR** acetaminophen (TYLENOL) suppository 650 mg, 650 mg, Rectal, Q4H PRN, Eusebio Farnsworth MD  •  atorvastatin (LIPITOR) tablet 40 mg, 40 mg, Oral, Daily, Urban Jeferson Nidadavolu, MD, 40 mg at 01/10/18 0860  •  bisacodyl (DULCOLAX) EC tablet 10 mg, 10 mg, Oral,  Daily PRN, Nghia Esparza MD, 10 mg at 01/09/18 1047  •  bisacodyl (DULCOLAX) suppository 10 mg, 10 mg, Rectal, Daily, Ethan Frank MD, 10 mg at 01/09/18 1438  •  dextrose (D50W) solution 25 g, 25 g, Intravenous, Q15 Min PRN, Eusebio Farnsworth MD  •  dextrose (GLUTOSE) oral gel 15 g, 15 g, Oral, Q15 Min PRN, Eusebio Farnsworth MD  •  docusate sodium (COLACE) capsule 100 mg, 100 mg, Oral, BID PRN, Nghia Esparza MD, 100 mg at 01/09/18 0844  •  enoxaparin (LOVENOX) syringe 40 mg, 40 mg, Subcutaneous, Q24H, Urban Mason MD, 40 mg at 01/09/18 2119  •  glipiZIDE (GLUCOTROL) tablet 10 mg, 10 mg, Oral, QAM AC, Juan Nicholson MD, 10 mg at 01/10/18 0825  •  glucagon (human recombinant) (GLUCAGEN DIAGNOSTIC) injection 1 mg, 1 mg, Subcutaneous, Q15 Min PRN, Eusebio Farnsworth MD  •  HYDROcodone-acetaminophen (NORCO)  MG per tablet 1 tablet, 1 tablet, Oral, Q4H PRN, Nghia Esparza MD, 1 tablet at 01/09/18 2120  •  Influenza Vac Subunit Quad (FLUCELVAX) injection 0.5 mL, 0.5 mL, Intramuscular, During Hospitalization, Eusebio Farnsworth MD  •  insulin aspart (novoLOG) injection 2-5 Units, 2-5 Units, Subcutaneous, 4x Daily AC & at Bedtime, Ever Gongora MD, 2 Units at 01/10/18 0826  •  ipratropium-albuterol (DUO-NEB) nebulizer solution 3 mL, 3 mL, Nebulization, 4x Daily - RT, Eusebio Farnsworth MD, 3 mL at 01/09/18 1658  •  Magnesium Sulfate 2 gram infusion - Mg less than or equal to 1.5 mg/dL, 2 g, Intravenous, PRN **OR** Magesium Sulfate 1 gram infusion - Mg 1.6-1.9 mg/dL, 1 g, Intravenous, PRN, Eusebio Farnsworth MD  •  metFORMIN (GLUCOPHAGE) tablet 500 mg, 500 mg, Oral, BID With Meals, Juan Nicholson MD, 500 mg at 01/10/18 0825  •  metoprolol tartrate (LOPRESSOR) tablet 50 mg, 50 mg, Oral, Q8H, Jesika Owen, APRN, 50 mg at 01/10/18 0824  •  morphine injection 4 mg, 4 mg, Intravenous, Q4H PRN, Yuri Frank MD, 4 mg at 01/09/18 0023  •  naloxone (NARCAN) injection 0.1 mg,  0.1 mg, Intravenous, Q5 Min PRN, Nghia Esparza MD  •  ondansetron (ZOFRAN) injection 4 mg, 4 mg, Intravenous, Q6H PRN, Eusebio Farnsworth MD  •  ondansetron (ZOFRAN) tablet 4 mg, 4 mg, Oral, Q6H PRN **OR** ondansetron ODT (ZOFRAN-ODT) disintegrating tablet 4 mg, 4 mg, Oral, Q6H PRN **OR** ondansetron (ZOFRAN) injection 4 mg, 4 mg, Intravenous, Q6H PRN, Nghia Esparza MD  •  oxyCODONE-acetaminophen (PERCOCET)  MG per tablet 1 tablet, 1 tablet, Oral, Q4H PRN, Chacho Kam MD, 1 tablet at 01/10/18 0831  •  penicillin G potassium 4 Million Units in sodium chloride 0.9 % 100 mL IVPB, 4 Million Units, Intravenous, Q4H, Bowen Ford MD, 4 Million Units at 01/10/18 0556  •  potassium chloride (MICRO-K) CR capsule 40 mEq, 40 mEq, Oral, PRN, 40 mEq at 01/09/18 1438 **OR** potassium chloride (KLOR-CON) packet 40 mEq, 40 mEq, Oral, PRN **OR** potassium chloride 10 mEq in 100 mL IVPB, 10 mEq, Intravenous, Q1H PRN, Eusebio Farnsworth MD, Last Rate: 100 mL/hr at 12/27/17 0241, 10 mEq at 12/27/17 0241  •  sodium chloride 0.9 % flush 1-10 mL, 1-10 mL, Intravenous, PRN, Eusebio Farnswotrh MD  •  Sodium Hypochloride 0.0625 % (Dakins 1/8th Strength), 946 mL, Irrigation, Q12H, Regulo Bowers MD, 946 mL at 01/09/18 2255  •  tamsulosin (FLOMAX) 24 hr capsule 0.4 mg, 0.4 mg, Oral, Daily, Quintin Lance MD, 0.4 mg at 01/10/18 0825    Assessment/Plan     Patient Active Problem List   Diagnosis   • Sepsis   • Abnormal MRI, lumbar spine   • Diabetes   • Abscess in epidural space of lumbar spine   • Abscess in epidural space of thoracic spine   • Postoperative anemia due to acute blood loss   • Type 2 diabetes mellitus without complication     Patient is currently receiving metformin and glipizide  Most of the blood sugars are in the 100 range  Patient is able to eat and tolerating food  He believes that he will be going to rehabilitation soon  It appears that his blood sugars are well controlled on the oral  "regimen right now  He might not need scheduled insulin other than sliding scale insulin  Patient verbalized understanding.    The total time spent for old record and lab review and floor time was more than 35 min of which greater than 20 min of time was spent on counseling the patient on recommended evaluation and treatment options, instructions for management/treatment and /or follow up  and importance of compliance with chosen management or treatment options      Renan Vargas MD FACE.  01/10/18  9:03 AM      EMR Dragon / transcription disclaimer:     \"Dictated utilizing Dragon dictation\".   "

## 2018-01-10 NOTE — PROGRESS NOTES
Continued Stay Note  [facility]     Patient Name: Sam Barrett  MRN: [id number]  Today's Date: 1/10/2018    Admit Date: 2017          Discharge Plan       01/10/18 1358    Case Management/Social Work Plan    Plan skilled care at [facility]    Patient/Family In Agreement With Plan yes    Additional Comments Spoke with Fidel with [facility] and they do have precert for patient to return today.  Precert does  after today.  Packet at nurses station.                Discharge Codes     None        Expected Discharge Date and Time     Expected Discharge Date Expected Discharge Time    Nathan 10, 2018             Vivienne Khan RN

## 2018-01-11 VITALS
TEMPERATURE: 98.1 F | RESPIRATION RATE: 18 BRPM | HEIGHT: 77 IN | SYSTOLIC BLOOD PRESSURE: 125 MMHG | BODY MASS INDEX: 34.37 KG/M2 | HEART RATE: 80 BPM | OXYGEN SATURATION: 98 % | WEIGHT: 291.1 LBS | DIASTOLIC BLOOD PRESSURE: 88 MMHG

## 2018-01-11 LAB
ALBUMIN SERPL-MCNC: 2.4 G/DL (ref 3.5–5.2)
ANION GAP SERPL CALCULATED.3IONS-SCNC: 12.8 MMOL/L
BASOPHILS # BLD AUTO: 0.01 10*3/MM3 (ref 0–0.2)
BASOPHILS NFR BLD AUTO: 0.2 % (ref 0–1.5)
BUN BLD-MCNC: 18 MG/DL (ref 6–20)
BUN/CREAT SERPL: 17.1 (ref 7–25)
CALCIUM SPEC-SCNC: 8.9 MG/DL (ref 8.6–10.5)
CHLORIDE SERPL-SCNC: 105 MMOL/L (ref 98–107)
CO2 SERPL-SCNC: 25.2 MMOL/L (ref 22–29)
CREAT BLD-MCNC: 1.05 MG/DL (ref 0.76–1.27)
DEPRECATED RDW RBC AUTO: 52.6 FL (ref 37–54)
EOSINOPHIL # BLD AUTO: 0.21 10*3/MM3 (ref 0–0.7)
EOSINOPHIL NFR BLD AUTO: 3.9 % (ref 0.3–6.2)
ERYTHROCYTE [DISTWIDTH] IN BLOOD BY AUTOMATED COUNT: 15.1 % (ref 11.5–14.5)
GFR SERPL CREATININE-BSD FRML MDRD: 73 ML/MIN/1.73
GLUCOSE BLD-MCNC: 109 MG/DL (ref 65–99)
GLUCOSE BLDC GLUCOMTR-MCNC: 124 MG/DL (ref 70–130)
GLUCOSE BLDC GLUCOMTR-MCNC: 97 MG/DL (ref 70–130)
HCT VFR BLD AUTO: 26.4 % (ref 40.4–52.2)
HGB BLD-MCNC: 7.8 G/DL (ref 13.7–17.6)
IMM GRANULOCYTES # BLD: 0.08 10*3/MM3 (ref 0–0.03)
IMM GRANULOCYTES NFR BLD: 1.5 % (ref 0–0.5)
LYMPHOCYTES # BLD AUTO: 1.41 10*3/MM3 (ref 0.9–4.8)
LYMPHOCYTES NFR BLD AUTO: 26.1 % (ref 19.6–45.3)
MCH RBC QN AUTO: 28.6 PG (ref 27–32.7)
MCHC RBC AUTO-ENTMCNC: 29.5 G/DL (ref 32.6–36.4)
MCV RBC AUTO: 96.7 FL (ref 79.8–96.2)
MONOCYTES # BLD AUTO: 0.61 10*3/MM3 (ref 0.2–1.2)
MONOCYTES NFR BLD AUTO: 11.3 % (ref 5–12)
NEUTROPHILS # BLD AUTO: 3.09 10*3/MM3 (ref 1.9–8.1)
NEUTROPHILS NFR BLD AUTO: 57 % (ref 42.7–76)
PHOSPHATE SERPL-MCNC: 3.6 MG/DL (ref 2.5–4.5)
PLATELET # BLD AUTO: 249 10*3/MM3 (ref 140–500)
PMV BLD AUTO: 9.8 FL (ref 6–12)
POTASSIUM BLD-SCNC: 3.4 MMOL/L (ref 3.5–5.2)
RBC # BLD AUTO: 2.73 10*6/MM3 (ref 4.6–6)
SODIUM BLD-SCNC: 143 MMOL/L (ref 136–145)
WBC NRBC COR # BLD: 5.41 10*3/MM3 (ref 4.5–10.7)

## 2018-01-11 PROCEDURE — 25010000002 PENICILLIN G POTASSIUM PER 600000 UNITS: Performed by: INTERNAL MEDICINE

## 2018-01-11 PROCEDURE — 94799 UNLISTED PULMONARY SVC/PX: CPT

## 2018-01-11 PROCEDURE — 90661 CCIIV3 VAC ABX FR 0.5 ML IM: CPT | Performed by: INTERNAL MEDICINE

## 2018-01-11 PROCEDURE — G0008 ADMIN INFLUENZA VIRUS VAC: HCPCS | Performed by: INTERNAL MEDICINE

## 2018-01-11 PROCEDURE — 85025 COMPLETE CBC W/AUTO DIFF WBC: CPT | Performed by: INTERNAL MEDICINE

## 2018-01-11 PROCEDURE — 25010000002 INFLUENZA VAC SUBUNIT QUAD 0.5 ML SUSPENSION PREFILLED SYRINGE: Performed by: INTERNAL MEDICINE

## 2018-01-11 PROCEDURE — 25010000003 PENICILLIN G POTASSIUM PER 600000 UNITS: Performed by: INTERNAL MEDICINE

## 2018-01-11 PROCEDURE — 82962 GLUCOSE BLOOD TEST: CPT

## 2018-01-11 PROCEDURE — 80069 RENAL FUNCTION PANEL: CPT | Performed by: INTERNAL MEDICINE

## 2018-01-11 RX ORDER — MORPHINE SULFATE 15 MG/1
15 TABLET, FILM COATED, EXTENDED RELEASE ORAL EVERY 12 HOURS SCHEDULED
Qty: 6 TABLET | Refills: 0 | Status: SHIPPED | OUTPATIENT
Start: 2018-01-11

## 2018-01-11 RX ORDER — OXYCODONE HYDROCHLORIDE 15 MG/1
15 TABLET ORAL EVERY 6 HOURS PRN
Qty: 12 TABLET | Refills: 0 | Status: SHIPPED | OUTPATIENT
Start: 2018-01-11

## 2018-01-11 RX ADMIN — A/SINGAPORE/GP1908/2015 IVR-180 (H1N1) (AN A/MICHIGAN/45/2015-LIKE VIRUS), A/SINGAPORE/GP2050/2015 (H3N2) (AN A/HONG KONG/4801/2014 - LIKE VIRUS), B/UTAH/9/2014 (A B/PHUKET/3073/2013-LIKE VIRUS), B/HONG KONG/259/2010 (A B/BRISBANE/60/08-LIKE VIRUS) 0.5 ML: 15; 15; 15; 15 INJECTION, SUSPENSION INTRAMUSCULAR at 11:09

## 2018-01-11 RX ADMIN — SODIUM CHLORIDE 4 MILLION UNITS: 0.9 INJECTION INTRAVENOUS at 00:17

## 2018-01-11 RX ADMIN — GLIPIZIDE 10 MG: 10 TABLET ORAL at 08:25

## 2018-01-11 RX ADMIN — POTASSIUM CHLORIDE 40 MEQ: 750 CAPSULE, EXTENDED RELEASE ORAL at 10:16

## 2018-01-11 RX ADMIN — OXYCODONE HYDROCHLORIDE AND ACETAMINOPHEN 1 TABLET: 10; 325 TABLET ORAL at 06:00

## 2018-01-11 RX ADMIN — METOPROLOL TARTRATE 50 MG: 50 TABLET, FILM COATED ORAL at 00:17

## 2018-01-11 RX ADMIN — TAMSULOSIN HYDROCHLORIDE 0.4 MG: 0.4 CAPSULE ORAL at 08:25

## 2018-01-11 RX ADMIN — OXYCODONE HYDROCHLORIDE AND ACETAMINOPHEN 1 TABLET: 10; 325 TABLET ORAL at 01:12

## 2018-01-11 RX ADMIN — SODIUM CHLORIDE 4 MILLION UNITS: 0.9 INJECTION INTRAVENOUS at 12:08

## 2018-01-11 RX ADMIN — METFORMIN HYDROCHLORIDE 500 MG: 500 TABLET ORAL at 08:25

## 2018-01-11 RX ADMIN — POTASSIUM CHLORIDE 40 MEQ: 750 CAPSULE, EXTENDED RELEASE ORAL at 06:12

## 2018-01-11 RX ADMIN — METOPROLOL TARTRATE 50 MG: 50 TABLET, FILM COATED ORAL at 08:25

## 2018-01-11 RX ADMIN — OXYCODONE HYDROCHLORIDE AND ACETAMINOPHEN 1 TABLET: 10; 325 TABLET ORAL at 10:16

## 2018-01-11 RX ADMIN — Medication 10 ML: at 08:27

## 2018-01-11 RX ADMIN — SODIUM CHLORIDE 4 MILLION UNITS: 0.9 INJECTION INTRAVENOUS at 04:21

## 2018-01-11 RX ADMIN — ATORVASTATIN CALCIUM 40 MG: 20 TABLET, FILM COATED ORAL at 08:26

## 2018-01-11 RX ADMIN — IPRATROPIUM BROMIDE AND ALBUTEROL SULFATE 3 ML: .5; 3 SOLUTION RESPIRATORY (INHALATION) at 10:29

## 2018-01-11 RX ADMIN — OXYCODONE HYDROCHLORIDE AND ACETAMINOPHEN 1 TABLET: 10; 325 TABLET ORAL at 14:25

## 2018-01-11 NOTE — NURSING NOTE
Dr. Ethan Frank called and notified that pt Hgb 7.8, he states being notified, no new orders, patient is cleared for discharge once precert is complete. CCP notified.

## 2018-01-11 NOTE — PROGRESS NOTES
Continued Stay Note  [facility]     Patient Name: Sam Barrett  MRN: [id number]  Today's Date: 2018    Admit Date: 2017          Discharge Plan       18 1148    Case Management/Social Work Plan    Plan Skilled care at [facility]    Additional Comments Spoke with nursing and [facility] was not able to  patient on 1/10 until after midnight.  [facility]  junaid Parra said that the precert  after midnight 1/10.  Spoke with Holly with [facility] who states that the precert is in place until 1545 today .  Called Mushtaq with [facility].  ambulance transportation set up for 1430.                Discharge Codes     None        Expected Discharge Date and Time     Expected Discharge Date Expected Discharge Time    Nathan 10, 2018             Vivienne Khan RN

## 2018-01-11 NOTE — PLAN OF CARE
Problem: Patient Care Overview (Adult)  Goal: Plan of Care Review  Outcome: Ongoing (interventions implemented as appropriate)   01/11/18 4310   Outcome Evaluation   Outcome Summary/Follow up Plan pt unable to leave due to ambulance back up; Pt unable to void; I&O cathed x2; pain med x2; PICC in the [facility]; Will continue to monitor

## 2018-01-12 LAB — BACTERIA SPEC ANAEROBE CULT: NORMAL

## 2018-01-12 NOTE — PROGRESS NOTES
Case Management Discharge Note    Final Note: Skilled care at [facility].    Discharge Placement     Facility/Agency Request Status Selected? Address Phone Number Fax Number    [facility] Accepted    Yes [address] [place] [place] [address] [phone]        Martha Payan LCSW 12/29/2017 16:40    Pt will need [employer] Medicare precert               [facility] Accepted     [address] [place] [address] [place] [address] [phone]        Ale Childress, RN 12/26/2017 14:47    [facility] left for [facility] Accepted     [address] [facility] [address] [phone]    [facility] Pending - Request Sent     [address] [place] [address] [phone]    [facility] Pending - Request Sent     [address] [facility] [address] [phone]    [facility] Declined     [address] [facility] [address] [phone]        [facility]: [facility]    Discharge Codes: 03  Discharged/transferred to skilled nursing facility (SNF) with Medicare certification in anticipation of skilled care

## 2018-01-18 ENCOUNTER — OFFICE VISIT (OUTPATIENT)
Dept: NEUROSURGERY | Facility: CLINIC | Age: 58
End: 2018-01-18

## 2018-01-18 VITALS
BODY MASS INDEX: 33.06 KG/M2 | TEMPERATURE: 99.3 F | HEART RATE: 82 BPM | RESPIRATION RATE: 20 BRPM | WEIGHT: 280 LBS | DIASTOLIC BLOOD PRESSURE: 70 MMHG | SYSTOLIC BLOOD PRESSURE: 110 MMHG | HEIGHT: 77 IN

## 2018-01-18 DIAGNOSIS — Z09 POSTOP CHECK: Primary | ICD-10-CM

## 2018-01-18 PROCEDURE — 99024 POSTOP FOLLOW-UP VISIT: CPT | Performed by: NURSE PRACTITIONER

## 2018-01-18 NOTE — PROGRESS NOTES
" HPI:   Sam Barrett is a 57 y.o. male for follow-up of thoracic and lumbar abscess with group B strep septicemia. He is status post T9 through S1 laminectomy and drainage of abscess on January 4, 2018.  Intraoperative wound cultures showed rare growth of group B strep.  He also underwent I&D of right arm abscess on January 7, 2018.  Patient has history of IV drug abuse.  He was discharged to subacute rehabilitation at [facility].  He was taking good progress with physical therapy post surgery at the hospital.. He has not had postoperative complications.    He reports improvement in mobility. He is able to transfer from bed to chair and wheelchair on his own. He stood 1 minute longer and took twice as many steps yesterday. He has right leg swelling and pain. He has an ultrasound ordered for tonight. PT is working on upper body strengthening as well. His back pain is better but still quite a bit.     He presents unaccompanied.     Review of Systems   Constitutional: Negative for chills and fever.   Gastrointestinal: Negative for constipation.   Genitourinary: Negative for difficulty urinating and enuresis.   Musculoskeletal: Positive for back pain.        BLE pain     Skin: Negative for wound (wound redness, swelling, or drainage).   Neurological: Positive for weakness (BLE). Negative for numbness and headaches.   Psychiatric/Behavioral: Positive for sleep disturbance.        /70 (BP Location: Left arm, Patient Position: Sitting)  Pulse 82  Temp 99.3 °F (37.4 °C) (Tympanic)   Resp 20  Ht 195.6 cm (77\")  Wt 127 kg (280 lb)  BMI 33.2 kg/m2    Physical Exam   Constitutional: He appears well-developed and well-nourished.   Disheveled    Body mass index is 33.2 kg/(m^2).     Pulmonary/Chest: Effort normal.   Musculoskeletal: He exhibits edema (significant right calf;).   Poor posture with hyperkyphosis thoracic spine.  Midline thoracolumbar incision well approximated with no drainage.  " There is proud flesh along portions of the staple line, but no surrounding erythema.  Approximately 75 staples were removed without difficulty.  Incision overall remained well proximal made.  There was a small area of superficial gapping that was covered with a Steri-Strip.  There was no deep opening present.   Neurological: He is alert.   Psychiatric: He has a normal mood and affect. His behavior is normal. Judgment normal. Cognition and memory are normal.   Vitals reviewed.    Neurologic Exam     Mental Status   Level of consciousness: alert    Motor Exam   Muscle bulk: normal       4 out of 5 strength left lower extremity, right lower extremity distal strength affected by pain and swelling.  Proximal strength 4/5     Gait, Coordination, and Reflexes     Gait  Gait: (Not ambulated secondary to significant swelling of right lower extremity; patient presents in wheelchair)      Findings/Results:  No new imaging    Assessment/Plan:  Sam was seen today for post-op.    Diagnoses and all orders for this visit:    Postop check      Discussion/Summary  Patient presents for follow-up of thoracolumbar decompression secondary to epidural abscess.  His back pain has improved.  He does continue to have some leg pain.  His right leg is significantly worse today but he has had significant swelling and has an impending Doppler study of the lower extremity this evening.  Denies any chest pain or shortness of breath.  He states that he is making improvements in physical therapy with regard to mobility.  His exam is as noted above.  Pain and swelling limiting exam in the right lower extremity, but overall he's 4/5 strength.  Incision line is well approximated.  Staples removed without difficulty.  No signs of infection.  Patient discussed with Dr. Esparza.  We will see him back on a when necessary basis.  His wound is healing well.  He has scheduled follow-up with ID in mid February.  If they feel that patient or his follow-up with  us we will be happy to see him back.  We will leave any follow-up imaging to their discretion.  Discussed with patient's importance of letting us know if he has any wound-related issues including drainage, increasing/severe back pain, wound swelling or redness.    Plan: No Follow-up on file.         Patient Care Team    Patient Care Team:  Casey Verduzco Jr., MD as PCP - General (Family Medicine)  Bowen Ford MD as Consulting Physician (Infectious Diseases)    Geno Hernandez, APRN  1/18/2018    Dragon disclaimer:   Much of this encounter note is an electronic transcription/translation of spoken language to printed text. The electronic translation of spoken language may permit erroneous, or at times, nonsensical words or phrases to be inadvertently transcribed; Although I have reviewed the note for such errors, some may still exist.

## 2018-01-20 ENCOUNTER — LAB REQUISITION (OUTPATIENT)
Dept: LAB | Facility: HOSPITAL | Age: 58
End: 2018-01-20

## 2018-01-20 DIAGNOSIS — I82.91 CHRONIC EMBOLISM AND THROMBOSIS OF VEIN: ICD-10-CM

## 2018-01-20 LAB
INR PPP: 1.11 (ref 0.9–1.1)
PROTHROMBIN TIME: 13.9 SECONDS (ref 11.7–14.2)

## 2018-01-20 PROCEDURE — 85610 PROTHROMBIN TIME: CPT

## 2018-01-22 ENCOUNTER — APPOINTMENT (OUTPATIENT)
Dept: WOUND CARE | Facility: HOSPITAL | Age: 58
End: 2018-01-22
Attending: SURGERY

## 2018-01-28 ENCOUNTER — LAB REQUISITION (OUTPATIENT)
Dept: LAB | Facility: HOSPITAL | Age: 58
End: 2018-01-28

## 2018-01-28 DIAGNOSIS — Z00.00 ROUTINE GENERAL MEDICAL EXAMINATION AT A HEALTH CARE FACILITY: ICD-10-CM

## 2018-01-28 LAB
INR PPP: 1.39 (ref 0.9–1.1)
PROTHROMBIN TIME: 16.5 SECONDS (ref 11.7–14.2)

## 2018-01-28 PROCEDURE — 85610 PROTHROMBIN TIME: CPT

## 2018-01-28 NOTE — PLAN OF CARE
Problem: Fall Risk (Adult)  Goal: Absence of Falls  Outcome: Ongoing (interventions implemented as appropriate)   01/10/18 0325   Fall Risk (Adult)   Absence of Falls making progress toward outcome       Problem: Pressure Ulcer Risk (Antonio Scale) (Adult,Obstetrics,Pediatric)  Goal: Skin Integrity  Outcome: Ongoing (interventions implemented as appropriate)      Problem: Pain, Acute (Adult)  Goal: Acceptable Pain Control/Comfort Level  Outcome: Ongoing (interventions implemented as appropriate)      Problem: Skin Integrity Impairment, Risk/Actual (Adult)  Goal: Skin Integrity/Wound Healing  Outcome: Ongoing (interventions implemented as appropriate)         General

## 2018-01-30 ENCOUNTER — APPOINTMENT (OUTPATIENT)
Dept: WOUND CARE | Facility: HOSPITAL | Age: 58
End: 2018-01-30
Attending: SURGERY

## 2018-01-30 PROCEDURE — G0463 HOSPITAL OUTPT CLINIC VISIT: HCPCS

## 2018-01-31 LAB
FUNGUS WND CULT: NORMAL
FUNGUS WND CULT: NORMAL

## 2018-02-06 ENCOUNTER — APPOINTMENT (OUTPATIENT)
Dept: WOUND CARE | Facility: HOSPITAL | Age: 58
End: 2018-02-06
Attending: SURGERY

## 2018-02-06 PROCEDURE — G0463 HOSPITAL OUTPT CLINIC VISIT: HCPCS

## 2018-02-13 ENCOUNTER — APPOINTMENT (OUTPATIENT)
Dept: WOUND CARE | Facility: HOSPITAL | Age: 58
End: 2018-02-13
Attending: SURGERY

## 2018-02-13 PROCEDURE — G0463 HOSPITAL OUTPT CLINIC VISIT: HCPCS

## 2018-02-14 LAB
MYCOBACTERIUM SPEC CULT: NORMAL
MYCOBACTERIUM SPEC CULT: NORMAL
NIGHT BLUE STAIN TISS: NORMAL
NIGHT BLUE STAIN TISS: NORMAL

## 2018-03-01 ENCOUNTER — HOSPITAL ENCOUNTER (OUTPATIENT)
Dept: MRI IMAGING | Facility: HOSPITAL | Age: 58
Discharge: HOME | End: 2018-03-01
Attending: INTERNAL MEDICINE | Admitting: INTERNAL MEDICINE
